# Patient Record
Sex: MALE | Race: WHITE | Employment: FULL TIME | URBAN - NONMETROPOLITAN AREA
[De-identification: names, ages, dates, MRNs, and addresses within clinical notes are randomized per-mention and may not be internally consistent; named-entity substitution may affect disease eponyms.]

---

## 2017-07-10 ENCOUNTER — OFFICE VISIT (OUTPATIENT)
Dept: CARDIOLOGY | Age: 62
End: 2017-07-10

## 2017-07-10 VITALS
SYSTOLIC BLOOD PRESSURE: 136 MMHG | HEART RATE: 74 BPM | BODY MASS INDEX: 29.6 KG/M2 | WEIGHT: 188.6 LBS | DIASTOLIC BLOOD PRESSURE: 78 MMHG | HEIGHT: 67 IN

## 2017-07-10 DIAGNOSIS — E78.01 FAMILIAL HYPERCHOLESTEROLEMIA: ICD-10-CM

## 2017-07-10 DIAGNOSIS — I25.10 CORONARY ARTERY DISEASE INVOLVING NATIVE CORONARY ARTERY OF NATIVE HEART WITHOUT ANGINA PECTORIS: Primary | ICD-10-CM

## 2017-07-10 DIAGNOSIS — I10 ESSENTIAL HYPERTENSION: ICD-10-CM

## 2017-07-10 PROCEDURE — 93000 ELECTROCARDIOGRAM COMPLETE: CPT | Performed by: NUCLEAR MEDICINE

## 2017-07-10 PROCEDURE — 99213 OFFICE O/P EST LOW 20 MIN: CPT | Performed by: NUCLEAR MEDICINE

## 2017-07-10 RX ORDER — VALSARTAN 80 MG/1
TABLET ORAL
Qty: 90 TABLET | Refills: 3 | Status: SHIPPED | OUTPATIENT
Start: 2017-07-10 | End: 2018-07-12 | Stop reason: SDUPTHER

## 2017-07-10 RX ORDER — CARVEDILOL PHOSPHATE 10 MG/1
10 CAPSULE, EXTENDED RELEASE ORAL
Qty: 90 CAPSULE | Refills: 3 | Status: SHIPPED | OUTPATIENT
Start: 2017-07-10 | End: 2018-07-12 | Stop reason: SDUPTHER

## 2017-07-10 RX ORDER — ATORVASTATIN CALCIUM 10 MG/1
TABLET, FILM COATED ORAL
Qty: 90 TABLET | Refills: 3 | Status: SHIPPED | OUTPATIENT
Start: 2017-07-10 | End: 2018-07-07 | Stop reason: ALTCHOICE

## 2017-08-04 ENCOUNTER — HOSPITAL ENCOUNTER (OUTPATIENT)
Age: 62
Discharge: HOME | End: 2017-08-04
Payer: COMMERCIAL

## 2017-08-04 DIAGNOSIS — R97.20 ELEVATED PSA: ICD-10-CM

## 2017-08-04 LAB — PROSTATE SPECIFIC ANTIGEN: 2.69 NG/ML (ref 0–1)

## 2017-08-04 PROCEDURE — 36415 COLL VENOUS BLD VENIPUNCTURE: CPT

## 2017-08-04 PROCEDURE — 84153 ASSAY OF PSA TOTAL: CPT

## 2017-08-10 ENCOUNTER — OFFICE VISIT (OUTPATIENT)
Dept: UROLOGY | Age: 62
End: 2017-08-10
Payer: COMMERCIAL

## 2017-08-10 VITALS
WEIGHT: 185 LBS | DIASTOLIC BLOOD PRESSURE: 78 MMHG | HEIGHT: 67 IN | BODY MASS INDEX: 29.03 KG/M2 | SYSTOLIC BLOOD PRESSURE: 128 MMHG

## 2017-08-10 DIAGNOSIS — R31.29 MICROSCOPIC HEMATURIA: ICD-10-CM

## 2017-08-10 DIAGNOSIS — R97.20 ELEVATED PSA: ICD-10-CM

## 2017-08-10 DIAGNOSIS — N40.1 BPH WITH OBSTRUCTION/LOWER URINARY TRACT SYMPTOMS: Primary | ICD-10-CM

## 2017-08-10 DIAGNOSIS — N13.8 BPH WITH OBSTRUCTION/LOWER URINARY TRACT SYMPTOMS: Primary | ICD-10-CM

## 2017-08-10 DIAGNOSIS — R39.198 DIFFICULTY URINATING: ICD-10-CM

## 2017-08-10 LAB
BACTERIA: NORMAL
BILIRUBIN URINE: NEGATIVE
BILIRUBIN URINE: NEGATIVE
BLOOD URINE, POC: NORMAL
BLOOD, URINE: NEGATIVE
CASTS: NORMAL /LPF
CASTS: NORMAL /LPF
CHARACTER, URINE: CLEAR
CHARACTER, URINE: CLEAR
COLOR, URINE: YELLOW
COLOR: YELLOW
CRYSTALS: NORMAL
EPITHELIAL CELLS, UA: NORMAL /HPF
GLUCOSE URINE: NEGATIVE MG/DL
GLUCOSE, URINE: NEGATIVE MG/DL
KETONES, URINE: NEGATIVE
KETONES, URINE: NEGATIVE
LEUKOCYTE CLUMPS, URINE: NEGATIVE
LEUKOCYTE ESTERASE, URINE: NEGATIVE
MISCELLANEOUS LAB TEST RESULT: NORMAL
NITRITE, URINE: NEGATIVE
NITRITE, URINE: NEGATIVE
PH UA: 6.5
PH, URINE: 6.5
POST VOID RESIDUAL (PVR): 54 ML
PROTEIN UA: NEGATIVE MG/DL
PROTEIN, URINE: NEGATIVE MG/DL
RBC URINE: NORMAL /HPF
RENAL EPITHELIAL, UA: NORMAL
SPECIFIC GRAVITY UA: 1.01 (ref 1–1.03)
SPECIFIC GRAVITY, URINE: 1.01 (ref 1–1.03)
UROBILINOGEN, URINE: 0.2 EU/DL
UROBILINOGEN, URINE: 0.2 EU/DL
WBC UA: NORMAL /HPF
YEAST: NORMAL

## 2017-08-10 PROCEDURE — 81003 URINALYSIS AUTO W/O SCOPE: CPT | Performed by: PHYSICIAN ASSISTANT

## 2017-08-10 PROCEDURE — 99213 OFFICE O/P EST LOW 20 MIN: CPT | Performed by: PHYSICIAN ASSISTANT

## 2017-08-10 PROCEDURE — 51798 US URINE CAPACITY MEASURE: CPT | Performed by: PHYSICIAN ASSISTANT

## 2017-10-31 DIAGNOSIS — N40.0 BENIGN NON-NODULAR PROSTATIC HYPERPLASIA WITHOUT LOWER URINARY TRACT SYMPTOMS: ICD-10-CM

## 2017-10-31 RX ORDER — SILODOSIN 4 MG/1
CAPSULE ORAL
Qty: 30 CAPSULE | Refills: 0 | Status: SHIPPED | OUTPATIENT
Start: 2017-10-31 | End: 2017-11-28 | Stop reason: SDUPTHER

## 2017-12-05 ENCOUNTER — TELEPHONE (OUTPATIENT)
Dept: UROLOGY | Age: 62
End: 2017-12-05

## 2017-12-05 NOTE — TELEPHONE ENCOUNTER
Prior Auth initiated for Rapaflo . [facility] Should receive response in 3-5 days.

## 2018-03-27 DIAGNOSIS — N40.0 BENIGN NON-NODULAR PROSTATIC HYPERPLASIA WITHOUT LOWER URINARY TRACT SYMPTOMS: ICD-10-CM

## 2018-03-28 RX ORDER — FINASTERIDE 5 MG/1
TABLET, FILM COATED ORAL
Qty: 90 TABLET | Refills: 0 | Status: SHIPPED | OUTPATIENT
Start: 2018-03-28 | End: 2018-08-03 | Stop reason: SDUPTHER

## 2018-06-08 DIAGNOSIS — J02.9 ACUTE PHARYNGITIS, UNSPECIFIED ETIOLOGY: Primary | ICD-10-CM

## 2018-06-08 RX ORDER — DOXYCYCLINE HYCLATE 100 MG
100 TABLET ORAL 2 TIMES DAILY
Qty: 20 TABLET | Refills: 0 | Status: SHIPPED | OUTPATIENT
Start: 2018-06-08 | End: 2018-06-18

## 2018-06-30 ENCOUNTER — HOSPITAL ENCOUNTER (OUTPATIENT)
Age: 63
Discharge: HOME | End: 2018-06-30
Payer: COMMERCIAL

## 2018-06-30 DIAGNOSIS — R97.20 ELEVATED PSA: ICD-10-CM

## 2018-06-30 DIAGNOSIS — E78.01 FAMILIAL HYPERCHOLESTEROLEMIA: ICD-10-CM

## 2018-06-30 LAB
ALBUMIN SERPL-MCNC: 4.1 G/DL (ref 3.5–5.1)
ALP BLD-CCNC: 39 U/L (ref 38–126)
ALT SERPL-CCNC: 24 U/L (ref 11–66)
AST SERPL-CCNC: 16 U/L (ref 5–40)
BILIRUB SERPL-MCNC: 0.6 MG/DL (ref 0.3–1.2)
BILIRUBIN DIRECT: < 0.2 MG/DL (ref 0–0.3)
CHOLESTEROL, TOTAL: 201 MG/DL (ref 100–199)
HDLC SERPL-MCNC: 56 MG/DL
LDL CHOLESTEROL CALCULATED: 126 MG/DL
PROSTATE SPECIFIC ANTIGEN: 3.54 NG/ML (ref 0–1)
TOTAL PROTEIN: 6.8 G/DL (ref 6.1–8)
TRIGL SERPL-MCNC: 97 MG/DL (ref 0–199)

## 2018-06-30 PROCEDURE — 36415 COLL VENOUS BLD VENIPUNCTURE: CPT

## 2018-06-30 PROCEDURE — 80076 HEPATIC FUNCTION PANEL: CPT

## 2018-06-30 PROCEDURE — 80061 LIPID PANEL: CPT

## 2018-06-30 PROCEDURE — 84153 ASSAY OF PSA TOTAL: CPT

## 2018-07-05 ENCOUNTER — PATIENT MESSAGE (OUTPATIENT)
Dept: FAMILY MEDICINE CLINIC | Age: 63
End: 2018-07-05

## 2018-07-05 DIAGNOSIS — R05.9 COUGH: Primary | ICD-10-CM

## 2018-07-05 RX ORDER — BENZONATATE 100 MG/1
200 CAPSULE ORAL 3 TIMES DAILY PRN
Qty: 30 CAPSULE | Refills: 1 | Status: SHIPPED | OUTPATIENT
Start: 2018-07-05 | End: 2018-07-07 | Stop reason: ALTCHOICE

## 2018-07-07 ENCOUNTER — HOSPITAL ENCOUNTER (EMERGENCY)
Age: 63
Discharge: HOME | End: 2018-07-07
Payer: COMMERCIAL

## 2018-07-07 VITALS
SYSTOLIC BLOOD PRESSURE: 141 MMHG | HEIGHT: 67 IN | BODY MASS INDEX: 28.25 KG/M2 | WEIGHT: 180 LBS | TEMPERATURE: 97.4 F | RESPIRATION RATE: 16 BRPM | OXYGEN SATURATION: 98 % | HEART RATE: 89 BPM | DIASTOLIC BLOOD PRESSURE: 84 MMHG

## 2018-07-07 DIAGNOSIS — J06.9 ACUTE UPPER RESPIRATORY INFECTION: Primary | ICD-10-CM

## 2018-07-07 PROCEDURE — 99212 OFFICE O/P EST SF 10 MIN: CPT

## 2018-07-07 PROCEDURE — 99213 OFFICE O/P EST LOW 20 MIN: CPT | Performed by: NURSE PRACTITIONER

## 2018-07-07 RX ORDER — BROMPHENIRAMINE MALEATE, PSEUDOEPHEDRINE HYDROCHLORIDE, AND DEXTROMETHORPHAN HYDROBROMIDE 2; 30; 10 MG/5ML; MG/5ML; MG/5ML
5 SYRUP ORAL 4 TIMES DAILY PRN
Qty: 118 ML | Refills: 0 | Status: SHIPPED | OUTPATIENT
Start: 2018-07-07 | End: 2018-08-16

## 2018-07-07 RX ORDER — CETIRIZINE HYDROCHLORIDE 10 MG/1
10 TABLET ORAL DAILY
Qty: 30 TABLET | Refills: 0 | Status: SHIPPED | OUTPATIENT
Start: 2018-07-07 | End: 2018-08-16

## 2018-07-07 RX ORDER — FLUTICASONE PROPIONATE 50 MCG
SPRAY, SUSPENSION (ML) NASAL
Qty: 1 BOTTLE | Refills: 0 | Status: SHIPPED | OUTPATIENT
Start: 2018-07-07 | End: 2018-08-16

## 2018-07-07 ASSESSMENT — ENCOUNTER SYMPTOMS
NAUSEA: 0
SHORTNESS OF BREATH: 0
VOMITING: 0
SINUS PRESSURE: 1
COUGH: 1
SINUS PAIN: 0
SORE THROAT: 1
ABDOMINAL PAIN: 0
DIARRHEA: 0
WHEEZING: 0
RHINORRHEA: 1
CHEST TIGHTNESS: 0

## 2018-07-07 ASSESSMENT — PAIN DESCRIPTION - FREQUENCY: FREQUENCY: INTERMITTENT

## 2018-07-07 ASSESSMENT — PAIN SCALES - GENERAL: PAINLEVEL_OUTOF10: 3

## 2018-07-07 ASSESSMENT — PAIN DESCRIPTION - LOCATION: LOCATION: THROAT

## 2018-07-07 ASSESSMENT — PAIN DESCRIPTION - DESCRIPTORS: DESCRIPTORS: SORE

## 2018-07-07 ASSESSMENT — PAIN DESCRIPTION - PAIN TYPE: TYPE: ACUTE PAIN

## 2018-07-07 NOTE — ED NOTES
No change in patients condition. Discharge instructions and prescriptions discussed with pt. Pt. Verbalized understanding of info given and ambulated to exit in stable condition.       Autumn Beatty RN  07/07/18 7921

## 2018-07-07 NOTE — ED PROVIDER NOTES
[address]  Urgent Care Encounter       CHIEF COMPLAINT       Chief Complaint   Patient presents with    Nasal Congestion     onset couple days ago with a cough    Pharyngitis     post nasal drip        Nurses Notes reviewed and I agree except as noted in the HPI. HISTORY OF PRESENT ILLNESS   Rosalina Skiff is a 70 y.o. male who presents With complaints of runny nose, sore throat, and cough for the past 2 days. Patient reported that symptoms did intensify yesterday. He also reports some mild chest congestion, clear nasal drainage, and PND. He does also report mild fatigue occasional headache. He denies fever or chills. Appetite has been normal and no reports of nausea, vomiting, or diarrhea. Has taken some Excedrin sinus medication twice with minimal relief. The history is provided by the patient. No  was used. REVIEW OF SYSTEMS     Review of Systems   Constitutional: Negative for appetite change, chills, fatigue and fever. HENT: Positive for congestion, postnasal drip, rhinorrhea, sinus pressure (resolved) and sore throat (worse at night and in morning). Negative for ear pain (fullness) and sinus pain. Respiratory: Positive for cough. Negative for chest tightness, shortness of breath and wheezing. Cardiovascular: Negative for chest pain. Gastrointestinal: Negative for abdominal pain, diarrhea, nausea and vomiting. Musculoskeletal: Negative for myalgias. Neurological: Positive for headaches (occasional). Negative for dizziness. PAST MEDICAL HISTORY         Diagnosis Date    CAD (coronary artery disease)     Hyperlipidemia     MI, old        SURGICAL HISTORY     Patient  has a past surgical history that includes Coronary angioplasty with stent (2008).     CURRENT MEDICATIONS       Discharge Medication List as of 7/7/2018  8:29 AM      CONTINUE these medications which have NOT CHANGED    Details   finasteride (PROSCAR) 5 MG tablet TAKE 1 TABLET BY MOUTH EVERY DAY, Disp-90 tablet, R-0Normal      RAPAFLO 4 MG CAPS capsule TAKE 1 CAPSULE BY MOUTH ONCE NIGHTLY, Disp-30 capsule, R-11Normal      valsartan (DIOVAN) 80 MG tablet TAKE ONE TABLET BY MOUTH EVERY DAY, Disp-90 tablet, R-3Normal      carvedilol (COREG CR) 10 MG CP24 extended release capsule Take 1 capsule by mouth daily (with breakfast), Disp-90 capsule, R-3Normal      Cyanocobalamin (B-12 PO) Take by mouth      Multiple Vitamins-Minerals (THERAPEUTIC MULTIVITAMIN-MINERALS) tablet Take 1 tablet by mouth daily      aspirin 81 MG chewable tablet Take 81 mg by mouth daily. ALLERGIES     Patient is is allergic to azithromycin and pcn [penicillins]. Patients   Immunization History   Administered Date(s) Administered    Tdap (Boostrix, Adacel) 03/17/2015       FAMILY HISTORY     Patient's family history is not on file. SOCIAL HISTORY     Patient  reports that he quit smoking about 41 years ago. His smoking use included Cigarettes. He started smoking about 45 years ago. He has a 0.50 pack-year smoking history. He has never used smokeless tobacco. He reports that he does not drink alcohol or use drugs. PHYSICAL EXAM     ED TRIAGE VITALS  BP: (!) 141/84, Temp: 97.4 °F (36.3 °C), Pulse: 89, Resp: 16, SpO2: 98 %,Estimated body mass index is 28.19 kg/m² as calculated from the following:    Height as of this encounter: 5' 7\" (1.702 m). Weight as of this encounter: 180 lb (81.6 kg). ,No LMP for male patient. Physical Exam   Constitutional: He is oriented to person, place, and time. Vital signs are normal. He appears well-developed and well-nourished. He is cooperative. He does not appear ill. No distress. HENT:   Head: Normocephalic and atraumatic.    Right Ear: Hearing, tympanic membrane, external ear and ear canal normal.   Left Ear: Hearing, tympanic membrane, external ear and ear canal normal.   Nose: Nose normal. Right sinus exhibits no maxillary sinus tenderness and no frontal sinus tenderness. Left sinus exhibits no maxillary sinus tenderness and no frontal sinus tenderness. Mouth/Throat: Uvula is midline and mucous membranes are normal. Posterior oropharyngeal erythema (mild) present. No oropharyngeal exudate, posterior oropharyngeal edema or tonsillar abscesses. Neck: Neck supple. Cardiovascular: Normal rate, regular rhythm and normal heart sounds. Pulmonary/Chest: Effort normal and breath sounds normal. No respiratory distress. Abdominal: Soft. Lymphadenopathy:        Head (right side): No submental, no submandibular, no tonsillar, no preauricular, no posterior auricular and no occipital adenopathy present. Head (left side): No submental, no submandibular, no tonsillar, no preauricular, no posterior auricular and no occipital adenopathy present. He has no cervical adenopathy. Neurological: He is alert and oriented to person, place, and time. Skin: Skin is warm and dry. Psychiatric: He has a normal mood and affect. His speech is normal and behavior is normal. Thought content normal.   Nursing note and vitals reviewed. DIAGNOSTIC RESULTS   Labs:No results found for this visit on 07/07/18. IMAGING:    No orders to display         URGENT CARE COURSE:     Vitals:    07/07/18 0810   BP: (!) 141/84   Pulse: 89   Resp: 16   Temp: 97.4 °F (36.3 °C)   TempSrc: Temporal   SpO2: 98%   Weight: 180 lb (81.6 kg)   Height: 5' 7\" (1.702 m)       Medications - No data to display         PROCEDURES:  None    FINAL IMPRESSION      1. Acute upper respiratory infection          DISPOSITION/PLAN   DISPOSITION    Plenty of fluids orally.  Salt water gargles as needed for sore throat or OTC throat spray/lozenges.  Cool mist humidifier at bedside for congestion.  Saline rinses as needed for nasal congestion.  May take Tylenol and/or Ibuprofen for fever or body aches as directed. May take OTC antihistamines/ decongestant as needed.  Follow up with family doctor as

## 2018-07-12 ENCOUNTER — OFFICE VISIT (OUTPATIENT)
Dept: CARDIOLOGY CLINIC | Age: 63
End: 2018-07-12
Payer: COMMERCIAL

## 2018-07-12 VITALS
HEIGHT: 67 IN | DIASTOLIC BLOOD PRESSURE: 74 MMHG | WEIGHT: 183.4 LBS | HEART RATE: 72 BPM | SYSTOLIC BLOOD PRESSURE: 128 MMHG | BODY MASS INDEX: 28.79 KG/M2

## 2018-07-12 DIAGNOSIS — I25.10 CORONARY ARTERY DISEASE INVOLVING NATIVE CORONARY ARTERY OF NATIVE HEART WITHOUT ANGINA PECTORIS: Primary | ICD-10-CM

## 2018-07-12 DIAGNOSIS — E78.01 FAMILIAL HYPERCHOLESTEROLEMIA: ICD-10-CM

## 2018-07-12 DIAGNOSIS — I10 ESSENTIAL HYPERTENSION: ICD-10-CM

## 2018-07-12 PROCEDURE — 99213 OFFICE O/P EST LOW 20 MIN: CPT | Performed by: NUCLEAR MEDICINE

## 2018-07-12 PROCEDURE — 93000 ELECTROCARDIOGRAM COMPLETE: CPT | Performed by: NUCLEAR MEDICINE

## 2018-07-12 RX ORDER — CARVEDILOL PHOSPHATE 10 MG/1
10 CAPSULE, EXTENDED RELEASE ORAL
Qty: 90 CAPSULE | Refills: 3 | Status: SHIPPED | OUTPATIENT
Start: 2018-07-12 | End: 2019-06-16 | Stop reason: SDUPTHER

## 2018-07-12 RX ORDER — VALSARTAN 80 MG/1
TABLET ORAL
Qty: 90 TABLET | Refills: 3 | Status: SHIPPED | OUTPATIENT
Start: 2018-07-12 | End: 2018-07-17 | Stop reason: SINTOL

## 2018-07-12 NOTE — PROGRESS NOTES
[facility] PROFESSIONAL SERVS  [facility]  [address]  [address]  [place] [address]  Dept: [phone]  Dept Fax: [phone]  Loc: [phone]    Visit Date: 2018    Renee Zavala is a 70 y.o. male who presents today for:  Chief Complaint   Patient presents with    1 Year Follow Up    Coronary Artery Disease    Hypertension    Hyperlipidemia   known stent to the LAD  Stopped statins due to memory issues that he was worried about and decided to stop it   Higher LDL   No angina  No changes in breathing  Higher risk patient        HPI:  HPI  Past Medical History:   Diagnosis Date    CAD (coronary artery disease)     Hyperlipidemia     MI, old       Past Surgical History:   Procedure Laterality Date    CORONARY ANGIOPLASTY WITH STENT PLACEMENT       History reviewed. No pertinent family history. Social History   Substance Use Topics    Smoking status: Former Smoker     Packs/day: 0.25     Years: 2.00     Types: Cigarettes     Start date: 1973     Quit date: 1977    Smokeless tobacco: Never Used    Alcohol use No      Current Outpatient Prescriptions   Medication Sig Dispense Refill    brompheniramine-pseudoephedrine-DM 30-2-10 MG/5ML syrup Take 5 mLs by mouth 4 times daily as needed for Congestion or Cough 118 mL 0    cetirizine (ZYRTEC) 10 MG tablet Take 1 tablet by mouth daily 30 tablet 0    fluticasone (FLONASE) 50 MCG/ACT nasal spray One spray in each nostril daily. Look at your toes while spraying.  1 Bottle 0    finasteride (PROSCAR) 5 MG tablet TAKE 1 TABLET BY MOUTH EVERY DAY 90 tablet 0    RAPAFLO 4 MG CAPS capsule TAKE 1 CAPSULE BY MOUTH ONCE NIGHTLY 30 capsule 11    valsartan (DIOVAN) 80 MG tablet TAKE ONE TABLET BY MOUTH EVERY DAY 90 tablet 3    carvedilol (COREG CR) 10 MG CP24 extended release capsule Take 1 capsule by mouth daily (with breakfast) 90 capsule 3    Cyanocobalamin (B-12 PO) Take by mouth      Multiple Vitamins-Minerals (THERAPEUTIC MULTIVITAMIN-MINERALS) tablet Take 1 tablet by mouth daily      aspirin 81 MG chewable tablet Take 81 mg by mouth daily. No current facility-administered medications for this visit. Allergies   Allergen Reactions    Azithromycin      hives    Pcn [Penicillins]      hives     Health Maintenance   Topic Date Due    AAA screen  1955    Hepatitis C screen  1955    HIV screen  06/21/1968    Shingles Vaccine (1 of 2 - 2 Dose Series) 06/21/2003    Colon cancer screen colonoscopy  06/21/2003    A1C test (Diabetic or Prediabetic)  07/02/2014    Pneumococcal low/med risk (1 of 2 - PCV13) 06/21/2018    Flu vaccine (1) 09/01/2018    Lipid screen  06/30/2023    DTaP/Tdap/Td vaccine (2 - Td) 03/17/2025       Subjective:  Review of Systems  General:   No fever, no chills, No fatigue or weight loss  Pulmonary:    No dyspnea, no wheezing  Cardiac:    Denies recent chest pain,   GI:     No nausea or vomiting, no abdominal pain  Neuro:    No dizziness or light headedness,   Musculoskeletal:  No recent active issues  Extremities:   No edema, good peripheral pulses      Objective:  Physical Exam  /74   Pulse 72   Ht 5' 7\" (1.702 m)   Wt 183 lb 6.4 oz (83.2 kg)   BMI 28.72 kg/m²   General:   Well developed, well nourished  Lungs:   Clear to auscultation  Heart:    Normal S1 S2, Slight murmur. no rubs, no gallops  Abdomen:   Soft, non tender, no organomegalies, positive bowel sounds  Extremities:   No edema, no cyanosis, good peripheral pulses  Neurological:   Awake, alert, oriented. No obvious focal deficits  Musculoskelatal:  No obvious deformities    Assessment:      Diagnosis Orders   1. Coronary artery disease involving native coronary artery of native heart without angina pectoris  EKG 12 Lead   2. Essential hypertension     3. Familial hypercholesterolemia     cardiac fair for now   ECG in office was done today. I reviewed the ECG.  No acute findings      Plan:  No Follow-up on file.  As above  Discussed statins again and in details   Continue risk factor modification and medical management  Thank you for allowing me to participate in the care of your patient. Please don't hesitate to contact me regarding any further issues related to the patient care    Orders Placed:  Orders Placed This Encounter   Procedures    EKG 12 Lead     Order Specific Question:   Reason for Exam?     Answer: Other       Medications Prescribed:  No orders of the defined types were placed in this encounter. Discussed use, benefit, and side effects of prescribed medications. All patient questions answered. Pt voiced understanding. Instructed to continue current medications, diet and exercise. Continue risk factor modification and medical management. Patient agreed with treatment plan. Follow up as directed.     Electronically signed by Eliz Dukes MD on 7/12/2018 at 9:55 AM

## 2018-07-17 ENCOUNTER — TELEPHONE (OUTPATIENT)
Dept: CARDIOLOGY CLINIC | Age: 63
End: 2018-07-17

## 2018-07-17 RX ORDER — IRBESARTAN 300 MG/1
300 TABLET ORAL DAILY
COMMUNITY
End: 2018-07-17 | Stop reason: SDUPTHER

## 2018-07-17 RX ORDER — IRBESARTAN 300 MG/1
300 TABLET ORAL DAILY
Qty: 90 TABLET | Refills: 3 | Status: SHIPPED | OUTPATIENT
Start: 2018-07-17 | End: 2019-08-11 | Stop reason: SDUPTHER

## 2018-07-17 RX ORDER — IRBESARTAN 300 MG/1
300 TABLET ORAL DAILY
Qty: 90 TABLET | Refills: 3 | Status: CANCELLED | OUTPATIENT
Start: 2018-07-17

## 2018-07-17 NOTE — TELEPHONE ENCOUNTER
Nubia Hammond called stating that she called [facility] and his Valsartan is on the list for recall. Verbalized understanding to start Avapro 300mg daily.    RX pended in a refill request.

## 2018-08-08 ENCOUNTER — PATIENT MESSAGE (OUTPATIENT)
Dept: FAMILY MEDICINE CLINIC | Age: 63
End: 2018-08-08

## 2018-08-08 RX ORDER — METHYLPREDNISOLONE 4 MG/1
TABLET ORAL
Qty: 1 KIT | Refills: 0 | Status: SHIPPED | OUTPATIENT
Start: 2018-08-08 | End: 2018-08-14

## 2018-08-14 NOTE — PROGRESS NOTES
Mr. Johnson Flank a 71 year old male with a history of an elevated PSA. His PSA has ranged from 7.72-9.01 before starting Proscar therapy. A prostate biopsy was performed in September 2013, which was negative for malignancy. A PCA-3 level was obtained in October 2014 due to the persistence of his PSA and his value was 7 (negative). He was started on Proscar in October 2014 but was suboptimally controlled. He states that he tried Flomax in the past but discontinued due to orthostatic side effects and ineffectiveness of medication. He started Rapaflo in February 2016 and has noticed an improvement in his irritative and obstructive symptoms. In regards to his current urinary health, he reports minimal frequency, urgency, and weakened stream. He is very pleased with his current urinary state. He denies fever/chills, flank pain, gross hematuria, dysuria, bowel irregularity. Of general medical concern, he denies dyspnea, chest pain, N/V, weight loss, night sweats, leg pain, or lightheadedness. Past Medical History:   Diagnosis Date    CAD (coronary artery disease)     Hyperlipidemia     MI, old        Past Surgical History:   Procedure Laterality Date    CORONARY ANGIOPLASTY WITH STENT PLACEMENT  2008       Current Outpatient Prescriptions on File Prior to Visit   Medication Sig Dispense Refill    finasteride (PROSCAR) 5 MG tablet TAKE 1 TABLET BY MOUTH EVERY DAY 90 tablet 3    irbesartan (AVAPRO) 300 MG tablet Take 1 tablet by mouth daily 90 tablet 3    carvedilol (COREG CR) 10 MG CP24 extended release capsule Take 1 capsule by mouth daily (with breakfast) 90 capsule 3    RAPAFLO 4 MG CAPS capsule TAKE 1 CAPSULE BY MOUTH ONCE NIGHTLY 30 capsule 11    Cyanocobalamin (B-12 PO) Take by mouth      Multiple Vitamins-Minerals (THERAPEUTIC MULTIVITAMIN-MINERALS) tablet Take 1 tablet by mouth daily      aspirin 81 MG chewable tablet Take 81 mg by mouth daily.          No current facility-administered medications on file prior to visit. Allergies   Allergen Reactions    Azithromycin      hives    Pcn [Penicillins]      hives       No family history on file. Social History     Social History    Marital status:      Spouse name: N/A    Number of children: N/A    Years of education: N/A     Occupational History    Not on file. Social History Main Topics    Smoking status: Former Smoker     Packs/day: 0.25     Years: 2.00     Types: Cigarettes     Start date: 1/1/1973     Quit date: 1/1/1977    Smokeless tobacco: Never Used    Alcohol use No    Drug use: No    Sexual activity: Not on file     Other Topics Concern    Not on file     Social History Narrative    No narrative on file       Review of Systems  No problems with ears, nose or throat. No problems with eyes. No chest pain, shortness of breath, abdominal pain, extremity pain or weakness, and no neurological deficits. No rashes. No swollen glands or lymph nodes.  symptoms per HPI. The remainder of the review of symptoms is negative.     Exam     /78   Ht 5' 7\" (1.702 m)   Wt 185 lb (83.9 kg)   BMI 28.98 kg/m²     Constitutional: He is oriented to person, place, and time. He appears well-developed and well-nourished.    HENT:    Head: Normocephalic and atraumatic. Cardiovascular: Normal rate, regular rhythm and normal heart sounds.  No murmur heard. Pulmonary/Chest: Effort normal and breath sounds normal.    Abdominal: Soft. Bowel sounds are normal. Non-tender. Non-distended. : Patient declined examination today. Musculoskeletal: He exhibits no edema or calf tenderness. Neurological: He is alert and oriented to person, place, and time.    Skin: Skin is warm and dry. Psychiatric: He has a normal mood and affect.  His behavior is normal.      Labs    Results for POC orders placed in visit on 08/16/18   POCT Urinalysis No Micro (Auto)   Result Value Ref Range    Glucose, Ur Negative NEGATIVE mg/dl    Bilirubin Urine

## 2018-08-16 ENCOUNTER — OFFICE VISIT (OUTPATIENT)
Dept: UROLOGY | Age: 63
End: 2018-08-16
Payer: COMMERCIAL

## 2018-08-16 VITALS
BODY MASS INDEX: 29.03 KG/M2 | SYSTOLIC BLOOD PRESSURE: 134 MMHG | DIASTOLIC BLOOD PRESSURE: 78 MMHG | WEIGHT: 185 LBS | HEIGHT: 67 IN

## 2018-08-16 DIAGNOSIS — R97.20 ELEVATED PSA: ICD-10-CM

## 2018-08-16 DIAGNOSIS — N13.8 BPH WITH OBSTRUCTION/LOWER URINARY TRACT SYMPTOMS: Primary | ICD-10-CM

## 2018-08-16 DIAGNOSIS — R39.198 DIFFICULTY URINATING: ICD-10-CM

## 2018-08-16 DIAGNOSIS — R31.29 MICROSCOPIC HEMATURIA: ICD-10-CM

## 2018-08-16 DIAGNOSIS — N40.1 BPH WITH OBSTRUCTION/LOWER URINARY TRACT SYMPTOMS: Primary | ICD-10-CM

## 2018-08-16 LAB
BACTERIA: ABNORMAL /HPF
BILIRUBIN URINE: NEGATIVE
BILIRUBIN URINE: NEGATIVE
BLOOD URINE, POC: ABNORMAL
BLOOD, URINE: ABNORMAL
CASTS 2: ABNORMAL /LPF
CASTS UA: ABNORMAL /LPF
CHARACTER, URINE: CLEAR
CHARACTER, URINE: CLEAR
COLOR, URINE: YELLOW
COLOR: YELLOW
CRYSTALS, UA: ABNORMAL
EPITHELIAL CELLS, UA: ABNORMAL /HPF
GLUCOSE URINE: NEGATIVE MG/DL
GLUCOSE URINE: NEGATIVE MG/DL
KETONES, URINE: NEGATIVE
KETONES, URINE: NEGATIVE
LEUKOCYTE CLUMPS, URINE: NEGATIVE
LEUKOCYTE ESTERASE, URINE: NEGATIVE
MISCELLANEOUS 2: ABNORMAL
NITRITE, URINE: NEGATIVE
NITRITE, URINE: NEGATIVE
PH UA: 5.5
PH, URINE: 5.5
POST VOID RESIDUAL (PVR): 91 ML
PROTEIN UA: NEGATIVE
PROTEIN, URINE: NEGATIVE MG/DL
RBC URINE: ABNORMAL /HPF
RENAL EPITHELIAL, UA: ABNORMAL
SPECIFIC GRAVITY, URINE: 1.01 (ref 1–1.03)
SPECIFIC GRAVITY, URINE: 1.02 (ref 1–1.03)
UROBILINOGEN, URINE: 0.2 EU/DL
UROBILINOGEN, URINE: 0.2 EU/DL
WBC UA: ABNORMAL /HPF
YEAST: ABNORMAL

## 2018-08-16 PROCEDURE — 99213 OFFICE O/P EST LOW 20 MIN: CPT | Performed by: PHYSICIAN ASSISTANT

## 2018-08-16 PROCEDURE — 81003 URINALYSIS AUTO W/O SCOPE: CPT | Performed by: PHYSICIAN ASSISTANT

## 2018-08-16 PROCEDURE — 51798 US URINE CAPACITY MEASURE: CPT | Performed by: PHYSICIAN ASSISTANT

## 2018-08-17 NOTE — PROGRESS NOTES
I have reviewed the patients chart and Vesta Calloway has discussed relevant portions with me. I agree with the assessment and plan.

## 2018-08-21 RX ORDER — CARVEDILOL PHOSPHATE 10 MG/1
CAPSULE, EXTENDED RELEASE ORAL
Qty: 90 CAPSULE | Refills: 3 | Status: SHIPPED | OUTPATIENT
Start: 2018-08-21 | End: 2019-07-26

## 2018-09-09 ENCOUNTER — TELEPHONE (OUTPATIENT)
Dept: UROLOGY | Age: 63
End: 2018-09-09

## 2018-09-10 ENCOUNTER — TELEPHONE (OUTPATIENT)
Dept: UROLOGY | Age: 63
End: 2018-09-10

## 2018-09-10 NOTE — TELEPHONE ENCOUNTER
Spoke with patient regarding urine cytology results and recommendation of Bladder Cx testing. Patient stated he had looked at his results of the cytology on My Chart and didn't think that there was much of a difference from the last one. He stated that he isn't sure that financially he wants to pay the expense for further testing. I tried to give him the contact number for [facility] to discuss the Bladder Cx out of pocket costs and he was not interested. He stated he would think about it. He also said that if you wanted to send him anything through My Chart he would look at it.

## 2018-09-10 NOTE — TELEPHONE ENCOUNTER
Office staff called Mr. Jean-Paul Short regarding his abnormal urine cytology findings. He declined further evaluation. We will send a letter to him in case he did not understand the potential seriousness of this abnormal finding. . Taylor Maki were recently contacted by our office (via phone) regarding your recent abnormal urine cytology results. To clarify, the atypical cells found in your urine constitute a new finding. Urine cytologies obtained in 2016 and 2017 were negative. Atypical cells in the urine can be the result of benign conditions such as inflammation or infection within the urinary tract. However, they can also be an indicator for more serious pathology such as a tumor within the genitourinary tract. We do have genetic testing available that can be performed on a urine sample that can help rule out cancer as a cause for these atypical cells. Genetic testing can detect tumors at a very early stage, making diagnosis and treatment much more timely. I highly recommend Bladder Cx testing as the next step to see if more invasive testing is necessary. Please contact our office immediately for further information about the Bladder Cx testing. Thank you for your consideration.

## 2018-09-10 NOTE — TELEPHONE ENCOUNTER
Patient went to pharmacy to  Rapaflo and the pharmacy states that it needs a prior authorization. They gave them a number to have our office contact to initiate the prior authorization, [phone]. Can you please look into this and let them know when you find out? Thank you.

## 2018-09-10 NOTE — TELEPHONE ENCOUNTER
History: Patient has a history of microscopic hematuria. 2017 urine cytology was negative. Office staff may deliver the following message. Patient's 2018 urine cytology was positive for atypical cells. Atypical cells can be from benign conditions such as inflammation or infection but can also be from more serious pathology such as a tumor within the  tract. I would like to obtain genetic testing on his urine to rule out cancer as a cause for these atypical cells. I highly recommend Bladder Cx testing as the next step to see if more invasive testing is necessary. Please discuss patient's possible out of pocket cost for this testing. If he does not want to proceed with this testing, please let me know immediately.

## 2018-12-10 DIAGNOSIS — N40.0 BENIGN NON-NODULAR PROSTATIC HYPERPLASIA WITHOUT LOWER URINARY TRACT SYMPTOMS: ICD-10-CM

## 2018-12-11 RX ORDER — SILODOSIN 4 MG/1
CAPSULE ORAL
Qty: 30 CAPSULE | Refills: 0 | Status: SHIPPED | OUTPATIENT
Start: 2018-12-11 | End: 2019-01-25 | Stop reason: SDUPTHER

## 2019-01-25 ENCOUNTER — TELEPHONE (OUTPATIENT)
Dept: UROLOGY | Age: 64
End: 2019-01-25

## 2019-01-25 DIAGNOSIS — N40.0 BENIGN NON-NODULAR PROSTATIC HYPERPLASIA WITHOUT LOWER URINARY TRACT SYMPTOMS: ICD-10-CM

## 2019-01-25 RX ORDER — SILODOSIN 4 MG/1
4 CAPSULE ORAL EVERY EVENING
Qty: 30 CAPSULE | Refills: 11 | Status: SHIPPED | OUTPATIENT
Start: 2019-01-25 | End: 2020-01-23

## 2019-06-17 RX ORDER — CARVEDILOL PHOSPHATE 10 MG/1
CAPSULE, EXTENDED RELEASE ORAL
Qty: 90 CAPSULE | Refills: 0 | Status: SHIPPED | OUTPATIENT
Start: 2019-06-17 | End: 2020-01-22

## 2019-07-26 ENCOUNTER — OFFICE VISIT (OUTPATIENT)
Dept: CARDIOLOGY CLINIC | Age: 64
End: 2019-07-26
Payer: COMMERCIAL

## 2019-07-26 VITALS
DIASTOLIC BLOOD PRESSURE: 80 MMHG | WEIGHT: 190 LBS | HEART RATE: 60 BPM | BODY MASS INDEX: 29.82 KG/M2 | HEIGHT: 67 IN | SYSTOLIC BLOOD PRESSURE: 132 MMHG

## 2019-07-26 DIAGNOSIS — E78.01 FAMILIAL HYPERCHOLESTEROLEMIA: ICD-10-CM

## 2019-07-26 DIAGNOSIS — I25.10 CORONARY ARTERY DISEASE INVOLVING NATIVE CORONARY ARTERY OF NATIVE HEART WITHOUT ANGINA PECTORIS: Primary | ICD-10-CM

## 2019-07-26 DIAGNOSIS — I10 ESSENTIAL HYPERTENSION: ICD-10-CM

## 2019-07-26 PROCEDURE — 99213 OFFICE O/P EST LOW 20 MIN: CPT | Performed by: NUCLEAR MEDICINE

## 2019-07-26 PROCEDURE — 93000 ELECTROCARDIOGRAM COMPLETE: CPT | Performed by: NUCLEAR MEDICINE

## 2019-07-26 NOTE — PROGRESS NOTES
[address] [facility],[place] [address] [address] [facility] [address]  [facility] [address]  Dept: [phone]  Dept Fax: [phone]  Loc: [phone]    Visit Date: 2019    James Diallo is a 75 y.o. male who presents todayfor:  Chief Complaint   Patient presents with    Hypertension    Hyperlipidemia    Coronary Artery Disease     Known LAd stent before  No chest pain   No changes in breathing  No new symptoms  Stooped his statins  No other issues      HPI:  HPI  Past Medical History:   Diagnosis Date    CAD (coronary artery disease)     Hyperlipidemia     MI, old       Past Surgical History:   Procedure Laterality Date    CORONARY ANGIOPLASTY WITH STENT PLACEMENT       No family history on file. Social History     Tobacco Use    Smoking status: Former Smoker     Packs/day: 0.25     Years: 2.00     Pack years: 0.50     Types: Cigarettes     Start date: 1973     Last attempt to quit: 1977     Years since quittin.5    Smokeless tobacco: Never Used   Substance Use Topics    Alcohol use: No      Current Outpatient Medications   Medication Sig Dispense Refill    carvedilol (COREG CR) 10 MG CP24 extended release capsule TAKE 1 CAPSULE BY MOUTH DAILY WITH BREAKFAST 90 capsule 0    silodosin (RAPAFLO) 4 MG CAPS capsule Take 1 capsule by mouth every evening 30 capsule 11    finasteride (PROSCAR) 5 MG tablet TAKE 1 TABLET BY MOUTH EVERY DAY 90 tablet 3    irbesartan (AVAPRO) 300 MG tablet Take 1 tablet by mouth daily 90 tablet 3    Cyanocobalamin (B-12 PO) Take by mouth      Multiple Vitamins-Minerals (THERAPEUTIC MULTIVITAMIN-MINERALS) tablet Take 1 tablet by mouth daily      aspirin 81 MG chewable tablet Take 81 mg by mouth daily. No current facility-administered medications for this visit.       Allergies   Allergen Reactions    Azithromycin      hives    Pcn [Penicillins]      hives     Health Maintenance   Topic Date Due    Potassium 12 lead     Order Specific Question:   Reason for Exam?     Answer: Other       Medications Prescribed:  No orders of the defined types were placed in this encounter. Discussed use, benefit, and side effects of prescribed medications. All patient questions answered. Pt voicedunderstanding. Instructed to continue current medications, diet and exercise. Continue risk factor modification and medical management. Patient agreed with treatment plan. Follow up as directed.     Electronically signedby Rodolfo Aleman MD on 7/26/2019 at 7:40 AM

## 2019-08-13 RX ORDER — IRBESARTAN 300 MG/1
300 TABLET ORAL DAILY
Qty: 90 TABLET | Refills: 0 | Status: SHIPPED | OUTPATIENT
Start: 2019-08-13 | End: 2019-11-10 | Stop reason: SDUPTHER

## 2019-08-19 DIAGNOSIS — R97.20 ELEVATED PSA: Primary | ICD-10-CM

## 2019-08-20 ENCOUNTER — HOSPITAL ENCOUNTER (OUTPATIENT)
Age: 64
Discharge: HOME | End: 2019-08-20
Payer: COMMERCIAL

## 2019-08-20 DIAGNOSIS — R97.20 ELEVATED PSA: ICD-10-CM

## 2019-08-20 LAB — PROSTATE SPECIFIC ANTIGEN: 2.21 NG/ML (ref 0–1)

## 2019-08-20 PROCEDURE — 36415 COLL VENOUS BLD VENIPUNCTURE: CPT

## 2019-08-20 PROCEDURE — 84153 ASSAY OF PSA TOTAL: CPT

## 2019-08-20 NOTE — PROGRESS NOTES
Mr. Breanna Hays is a 40-year-old male with a history of an elevated PSA. His PSA has ranged from 7.72-9.01 before starting Proscar therapy. A prostate biopsy was performed in September 2013, which was negative for malignancy. A PCA-3 level was obtained in October 2014 due to the persistence of his PSA and his value was 7 (negative). He was started on Proscar in October 2014 but was suboptimally controlled. He states that he tried Flomax in the past but discontinued due to orthostatic side effects and ineffectiveness of medication. He started Rapaflo in February 2016 and has noticed an improvement in his irritative and obstructive symptoms. In regards to his current urinary health, he reports minimal frequency, urgency, and weakened stream. He is very pleased with his current urinary state. He denies fever/chills, flank pain, gross hematuria, dysuria, bowel irregularity. Of general medical concern, he denies dyspnea, chest pain, N/V, weight loss, night sweats, leg pain, or lightheadedness. He presents today for further evaluation. Past Medical History:   Diagnosis Date    CAD (coronary artery disease)     Hyperlipidemia     MI, old        Past Surgical History:   Procedure Laterality Date    CORONARY ANGIOPLASTY WITH STENT PLACEMENT  2008       Current Outpatient Medications on File Prior to Visit   Medication Sig Dispense Refill    irbesartan (AVAPRO) 300 MG tablet TAKE 1 TABLET BY MOUTH DAILY 90 tablet 0    carvedilol (COREG CR) 10 MG CP24 extended release capsule TAKE 1 CAPSULE BY MOUTH DAILY WITH BREAKFAST 90 capsule 0    silodosin (RAPAFLO) 4 MG CAPS capsule Take 1 capsule by mouth every evening 30 capsule 11    finasteride (PROSCAR) 5 MG tablet TAKE 1 TABLET BY MOUTH EVERY DAY 90 tablet 3    Cyanocobalamin (B-12 PO) Take by mouth      Multiple Vitamins-Minerals (THERAPEUTIC MULTIVITAMIN-MINERALS) tablet Take 1 tablet by mouth daily      aspirin 81 MG chewable tablet Take 81 mg by mouth daily. appointment per patient request. Call with questions or concerns in the meantime.      2. History of elevated PSA- PSA has decreased from 7.72 to 2.80 after initiating Proscar therapy. August 2019 PSA has decreased substantially from 3.54 to 2.21 over the last year. He did not have an MRI of the pelvis which was recommended, based upon his PSA last year. He agrees to continue to monitor PSA levels closely. He refuses to have a WALTER today. Patient is very apprehensive about further prostate biopsies as he states he had moderate pain and bleeding after his procedure in 2013. He states that he will consider MRI of the pelvis with and without contrast only if his PSA dramatically rises. Will recheck PSA in six months and call with results.       3. Microscopic hematuria/Abnormal urine cytology- Per urine dip. Urine cytology last August 2018 was positive for atypical cells. He refused Bladder Cx testing and office cystoscopy for further delineation. We will obtain a urine cytology and micro today.

## 2019-08-22 ENCOUNTER — OFFICE VISIT (OUTPATIENT)
Dept: UROLOGY | Age: 64
End: 2019-08-22
Payer: COMMERCIAL

## 2019-08-22 VITALS
BODY MASS INDEX: 29.51 KG/M2 | WEIGHT: 188 LBS | DIASTOLIC BLOOD PRESSURE: 72 MMHG | SYSTOLIC BLOOD PRESSURE: 124 MMHG | HEIGHT: 67 IN

## 2019-08-22 DIAGNOSIS — N13.8 BPH WITH OBSTRUCTION/LOWER URINARY TRACT SYMPTOMS: ICD-10-CM

## 2019-08-22 DIAGNOSIS — R39.198 DIFFICULTY URINATING: ICD-10-CM

## 2019-08-22 DIAGNOSIS — R97.20 ELEVATED PSA: Primary | ICD-10-CM

## 2019-08-22 DIAGNOSIS — N40.1 BPH WITH OBSTRUCTION/LOWER URINARY TRACT SYMPTOMS: ICD-10-CM

## 2019-08-22 DIAGNOSIS — R31.29 MICROSCOPIC HEMATURIA: ICD-10-CM

## 2019-08-22 LAB
BILIRUBIN URINE: NEGATIVE
BILIRUBIN URINE: NEGATIVE
BLOOD URINE, POC: NORMAL
BLOOD, URINE: NEGATIVE
CHARACTER, URINE: CLEAR
CHARACTER, URINE: CLEAR
COLOR, URINE: YELLOW
COLOR: YELLOW
GLUCOSE URINE: NEGATIVE MG/DL
GLUCOSE URINE: NEGATIVE MG/DL
KETONES, URINE: NEGATIVE
KETONES, URINE: NEGATIVE
LEUKOCYTE CLUMPS, URINE: NEGATIVE
LEUKOCYTE ESTERASE, URINE: NEGATIVE
NITRITE, URINE: NEGATIVE
NITRITE, URINE: NEGATIVE
PH UA: 7 (ref 5–9)
PH, URINE: 7 (ref 5–9)
PROTEIN UA: NEGATIVE
PROTEIN, URINE: NEGATIVE MG/DL
SPECIFIC GRAVITY, URINE: 1.01 (ref 1–1.03)
SPECIFIC GRAVITY, URINE: 1.01 (ref 1–1.03)
UROBILINOGEN, URINE: 0.2 EU/DL (ref 0–1)
UROBILINOGEN, URINE: 0.2 EU/DL (ref 0–1)

## 2019-08-22 PROCEDURE — 99213 OFFICE O/P EST LOW 20 MIN: CPT | Performed by: PHYSICIAN ASSISTANT

## 2019-08-22 PROCEDURE — 81003 URINALYSIS AUTO W/O SCOPE: CPT | Performed by: PHYSICIAN ASSISTANT

## 2019-08-27 ENCOUNTER — TELEPHONE (OUTPATIENT)
Dept: UROLOGY | Age: 64
End: 2019-08-27

## 2019-08-27 NOTE — TELEPHONE ENCOUNTER
I attempted to call the patient regarding the message below.  I left a voicemail to return the call to the office

## 2019-09-10 DIAGNOSIS — N40.0 BENIGN NON-NODULAR PROSTATIC HYPERPLASIA WITHOUT LOWER URINARY TRACT SYMPTOMS: ICD-10-CM

## 2019-09-10 RX ORDER — FINASTERIDE 5 MG/1
TABLET, FILM COATED ORAL
Qty: 90 TABLET | Refills: 0 | Status: SHIPPED | OUTPATIENT
Start: 2019-09-10 | End: 2019-12-08 | Stop reason: SDUPTHER

## 2019-11-11 RX ORDER — IRBESARTAN 300 MG/1
300 TABLET ORAL DAILY
Qty: 90 TABLET | Refills: 3 | Status: SHIPPED | OUTPATIENT
Start: 2019-11-11 | End: 2020-11-13 | Stop reason: SDUPTHER

## 2019-11-20 ENCOUNTER — OFFICE VISIT (OUTPATIENT)
Dept: FAMILY MEDICINE CLINIC | Age: 64
End: 2019-11-20
Payer: COMMERCIAL

## 2019-11-20 VITALS
WEIGHT: 193.8 LBS | DIASTOLIC BLOOD PRESSURE: 70 MMHG | RESPIRATION RATE: 16 BRPM | HEART RATE: 76 BPM | SYSTOLIC BLOOD PRESSURE: 124 MMHG | BODY MASS INDEX: 30.35 KG/M2

## 2019-11-20 DIAGNOSIS — M25.562 ACUTE PAIN OF LEFT KNEE: Primary | ICD-10-CM

## 2019-11-20 PROCEDURE — 99213 OFFICE O/P EST LOW 20 MIN: CPT | Performed by: NURSE PRACTITIONER

## 2019-11-20 RX ORDER — NAPROXEN 500 MG/1
500 TABLET ORAL 2 TIMES DAILY PRN
Qty: 30 TABLET | Refills: 1 | Status: SHIPPED | OUTPATIENT
Start: 2019-11-20 | End: 2020-04-27

## 2019-11-20 ASSESSMENT — ENCOUNTER SYMPTOMS
ABDOMINAL PAIN: 0
SHORTNESS OF BREATH: 0
NAUSEA: 0
COUGH: 0

## 2019-11-20 ASSESSMENT — PATIENT HEALTH QUESTIONNAIRE - PHQ9
2. FEELING DOWN, DEPRESSED OR HOPELESS: 0
SUM OF ALL RESPONSES TO PHQ QUESTIONS 1-9: 0
SUM OF ALL RESPONSES TO PHQ9 QUESTIONS 1 & 2: 0
1. LITTLE INTEREST OR PLEASURE IN DOING THINGS: 0
SUM OF ALL RESPONSES TO PHQ QUESTIONS 1-9: 0

## 2019-11-21 ENCOUNTER — HOSPITAL ENCOUNTER (OUTPATIENT)
Dept: GENERAL RADIOLOGY | Age: 64
Discharge: HOME | End: 2019-11-21
Payer: COMMERCIAL

## 2019-11-21 ENCOUNTER — HOSPITAL ENCOUNTER (OUTPATIENT)
Age: 64
Discharge: HOME | End: 2019-11-21
Payer: COMMERCIAL

## 2019-11-21 DIAGNOSIS — M25.562 ACUTE PAIN OF LEFT KNEE: ICD-10-CM

## 2019-11-21 PROCEDURE — 73564 X-RAY EXAM KNEE 4 OR MORE: CPT

## 2019-11-25 ENCOUNTER — PATIENT MESSAGE (OUTPATIENT)
Dept: FAMILY MEDICINE CLINIC | Age: 64
End: 2019-11-25

## 2019-11-25 DIAGNOSIS — S83.8X2A MENISCAL INJURY, LEFT, INITIAL ENCOUNTER: Primary | ICD-10-CM

## 2019-11-25 DIAGNOSIS — M25.362 KNEE INSTABILITY, LEFT: ICD-10-CM

## 2019-11-25 DIAGNOSIS — M25.562 ACUTE PAIN OF LEFT KNEE: ICD-10-CM

## 2019-12-06 DIAGNOSIS — S83.232A COMPLEX TEAR OF MEDIAL MENISCUS OF LEFT KNEE AS CURRENT INJURY, INITIAL ENCOUNTER: Primary | ICD-10-CM

## 2019-12-08 DIAGNOSIS — N40.0 BENIGN NON-NODULAR PROSTATIC HYPERPLASIA WITHOUT LOWER URINARY TRACT SYMPTOMS: ICD-10-CM

## 2019-12-09 RX ORDER — FINASTERIDE 5 MG/1
TABLET, FILM COATED ORAL
Qty: 90 TABLET | Refills: 0 | Status: SHIPPED | OUTPATIENT
Start: 2019-12-09 | End: 2020-03-24

## 2020-01-22 RX ORDER — CARVEDILOL PHOSPHATE 10 MG/1
CAPSULE, EXTENDED RELEASE ORAL
Qty: 90 CAPSULE | Refills: 1 | Status: SHIPPED | OUTPATIENT
Start: 2020-01-22 | End: 2020-06-29

## 2020-01-22 NOTE — TELEPHONE ENCOUNTER
Prescription Refill Request    The pharmacy is requesting a refill of rapaflo, 4 mg, taken every evening  Last prescribed on 01/25/2019    Last office visit 08/22/2019 with provider Rosie ROSENBAUM  Next office visit 08/27/2020 with provider Rosie ROSENBAUM    Please send refill to [facility]    Thank you.

## 2020-01-23 RX ORDER — SILODOSIN 4 MG/1
4 CAPSULE ORAL EVERY EVENING
Qty: 30 CAPSULE | Refills: 11 | Status: SHIPPED | OUTPATIENT
Start: 2020-01-23 | End: 2020-12-31

## 2020-02-03 ENCOUNTER — TELEPHONE (OUTPATIENT)
Dept: CARDIOLOGY CLINIC | Age: 65
End: 2020-02-03

## 2020-02-13 ENCOUNTER — HOSPITAL ENCOUNTER (OUTPATIENT)
Dept: NON INVASIVE DIAGNOSTICS | Age: 65
Discharge: HOME | End: 2020-02-13
Payer: COMMERCIAL

## 2020-02-13 LAB
LV EF: 60 %
LVEF MODALITY: NORMAL

## 2020-02-13 PROCEDURE — A9500 TC99M SESTAMIBI: HCPCS | Performed by: NUCLEAR MEDICINE

## 2020-02-13 PROCEDURE — 93017 CV STRESS TEST TRACING ONLY: CPT | Performed by: NUCLEAR MEDICINE

## 2020-02-13 PROCEDURE — 2709999900 HC NON-CHARGEABLE SUPPLY

## 2020-02-13 PROCEDURE — 3430000000 HC RX DIAGNOSTIC RADIOPHARMACEUTICAL: Performed by: NUCLEAR MEDICINE

## 2020-02-13 PROCEDURE — 6360000002 HC RX W HCPCS

## 2020-02-13 PROCEDURE — 93306 TTE W/DOPPLER COMPLETE: CPT

## 2020-02-13 PROCEDURE — 78452 HT MUSCLE IMAGE SPECT MULT: CPT

## 2020-02-13 RX ADMIN — Medication 32.2 MILLICURIE: at 08:45

## 2020-02-13 RX ADMIN — Medication 9.8 MILLICURIE: at 07:54

## 2020-02-14 ENCOUNTER — TELEPHONE (OUTPATIENT)
Dept: CARDIOLOGY CLINIC | Age: 65
End: 2020-02-14

## 2020-02-19 NOTE — PROGRESS NOTES
NPO after midnight  [employer] and drivers license  Wear comfortable clean clothing  Do not bring jewelry  Shower night before and morning of surgery with a liquid antibacterial soap  Bring  medications   Follow all instructions given by your physician   needed at discharge  Call [phone] for any questions

## 2020-02-26 ENCOUNTER — PREP FOR PROCEDURE (OUTPATIENT)
Dept: CARDIOLOGY | Age: 65
End: 2020-02-26

## 2020-02-26 RX ORDER — SODIUM CHLORIDE 0.9 % (FLUSH) 0.9 %
10 SYRINGE (ML) INJECTION EVERY 12 HOURS SCHEDULED
Status: CANCELLED | OUTPATIENT
Start: 2020-02-26

## 2020-02-26 RX ORDER — ASPIRIN 325 MG
325 TABLET ORAL ONCE
Status: CANCELLED | OUTPATIENT
Start: 2020-02-26 | End: 2020-02-26

## 2020-02-26 RX ORDER — NITROGLYCERIN 0.4 MG/1
0.4 TABLET SUBLINGUAL EVERY 5 MIN PRN
Status: CANCELLED | OUTPATIENT
Start: 2020-02-26

## 2020-02-26 RX ORDER — DIPHENHYDRAMINE HYDROCHLORIDE 50 MG/ML
50 INJECTION INTRAMUSCULAR; INTRAVENOUS ONCE
Status: CANCELLED | OUTPATIENT
Start: 2020-02-26 | End: 2020-02-26

## 2020-02-26 RX ORDER — SODIUM CHLORIDE 0.9 % (FLUSH) 0.9 %
10 SYRINGE (ML) INJECTION PRN
Status: CANCELLED | OUTPATIENT
Start: 2020-02-26

## 2020-02-26 RX ORDER — SODIUM CHLORIDE 9 MG/ML
INJECTION, SOLUTION INTRAVENOUS CONTINUOUS
Status: CANCELLED | OUTPATIENT
Start: 2020-02-26

## 2020-02-27 ENCOUNTER — HOSPITAL ENCOUNTER (OUTPATIENT)
Dept: INPATIENT UNIT | Age: 65
Discharge: HOME | End: 2020-02-27
Attending: NUCLEAR MEDICINE | Admitting: NUCLEAR MEDICINE
Payer: COMMERCIAL

## 2020-02-27 VITALS
TEMPERATURE: 97.8 F | RESPIRATION RATE: 19 BRPM | HEART RATE: 68 BPM | OXYGEN SATURATION: 95 % | WEIGHT: 182 LBS | DIASTOLIC BLOOD PRESSURE: 69 MMHG | BODY MASS INDEX: 28.56 KG/M2 | HEIGHT: 67 IN | SYSTOLIC BLOOD PRESSURE: 112 MMHG

## 2020-02-27 LAB
ABO: NORMAL
ANION GAP SERPL CALCULATED.3IONS-SCNC: 15 MEQ/L (ref 8–16)
ANTIBODY IDENTIFICATION: NORMAL
ANTIBODY SCREEN: NORMAL
APTT: 33.2 SECONDS (ref 22–38)
BUN BLDV-MCNC: 15 MG/DL (ref 7–22)
CALCIUM SERPL-MCNC: 10.1 MG/DL (ref 8.5–10.5)
CHLORIDE BLD-SCNC: 105 MEQ/L (ref 98–111)
CO2: 24 MEQ/L (ref 23–33)
CREAT SERPL-MCNC: 0.9 MG/DL (ref 0.4–1.2)
EKG ATRIAL RATE: 65 BPM
EKG P AXIS: -10 DEGREES
EKG P-R INTERVAL: 132 MS
EKG Q-T INTERVAL: 390 MS
EKG QRS DURATION: 76 MS
EKG QTC CALCULATION (BAZETT): 405 MS
EKG R AXIS: -7 DEGREES
EKG T AXIS: -14 DEGREES
EKG VENTRICULAR RATE: 65 BPM
ERYTHROCYTE [DISTWIDTH] IN BLOOD BY AUTOMATED COUNT: 12.7 % (ref 11.5–14.5)
ERYTHROCYTE [DISTWIDTH] IN BLOOD BY AUTOMATED COUNT: 43.9 FL (ref 35–45)
GFR SERPL CREATININE-BSD FRML MDRD: 84 ML/MIN/1.73M2
GLUCOSE BLD-MCNC: 109 MG/DL (ref 70–108)
HCT VFR BLD CALC: 46.5 % (ref 42–52)
HEMOGLOBIN: 15 GM/DL (ref 14–18)
INDIRECT COOMBS: NORMAL
INR BLD: 1.03 (ref 0.85–1.13)
MCH RBC QN AUTO: 30.7 PG (ref 26–33)
MCHC RBC AUTO-ENTMCNC: 32.3 GM/DL (ref 32.2–35.5)
MCV RBC AUTO: 95.1 FL (ref 80–94)
PLATELET # BLD: 180 THOU/MM3 (ref 130–400)
PMV BLD AUTO: 11.2 FL (ref 9.4–12.4)
POTASSIUM REFLEX MAGNESIUM: 4.4 MEQ/L (ref 3.5–5.2)
RBC # BLD: 4.89 MILL/MM3 (ref 4.7–6.1)
RH FACTOR: NORMAL
SODIUM BLD-SCNC: 144 MEQ/L (ref 135–145)
WBC # BLD: 5.3 THOU/MM3 (ref 4.8–10.8)

## 2020-02-27 PROCEDURE — 36415 COLL VENOUS BLD VENIPUNCTURE: CPT

## 2020-02-27 PROCEDURE — 6360000002 HC RX W HCPCS

## 2020-02-27 PROCEDURE — 86870 RBC ANTIBODY IDENTIFICATION: CPT

## 2020-02-27 PROCEDURE — 93458 L HRT ARTERY/VENTRICLE ANGIO: CPT | Performed by: NUCLEAR MEDICINE

## 2020-02-27 PROCEDURE — 2500000003 HC RX 250 WO HCPCS

## 2020-02-27 PROCEDURE — 86885 COOMBS TEST INDIRECT QUAL: CPT

## 2020-02-27 PROCEDURE — 86850 RBC ANTIBODY SCREEN: CPT

## 2020-02-27 PROCEDURE — 86900 BLOOD TYPING SEROLOGIC ABO: CPT

## 2020-02-27 PROCEDURE — 86901 BLOOD TYPING SEROLOGIC RH(D): CPT

## 2020-02-27 PROCEDURE — C1769 GUIDE WIRE: HCPCS

## 2020-02-27 PROCEDURE — C1894 INTRO/SHEATH, NON-LASER: HCPCS

## 2020-02-27 PROCEDURE — 93005 ELECTROCARDIOGRAM TRACING: CPT | Performed by: NURSE PRACTITIONER

## 2020-02-27 PROCEDURE — 6360000004 HC RX CONTRAST MEDICATION: Performed by: NUCLEAR MEDICINE

## 2020-02-27 PROCEDURE — 85610 PROTHROMBIN TIME: CPT

## 2020-02-27 PROCEDURE — 2580000003 HC RX 258: Performed by: NURSE PRACTITIONER

## 2020-02-27 PROCEDURE — 80048 BASIC METABOLIC PNL TOTAL CA: CPT

## 2020-02-27 PROCEDURE — 2709999900 HC NON-CHARGEABLE SUPPLY

## 2020-02-27 PROCEDURE — 85730 THROMBOPLASTIN TIME PARTIAL: CPT

## 2020-02-27 PROCEDURE — 85027 COMPLETE CBC AUTOMATED: CPT

## 2020-02-27 RX ORDER — SODIUM CHLORIDE 0.9 % (FLUSH) 0.9 %
10 SYRINGE (ML) INJECTION PRN
Status: DISCONTINUED | OUTPATIENT
Start: 2020-02-27 | End: 2020-02-28 | Stop reason: HOSPADM

## 2020-02-27 RX ORDER — SODIUM CHLORIDE 9 MG/ML
INJECTION, SOLUTION INTRAVENOUS CONTINUOUS
Status: DISCONTINUED | OUTPATIENT
Start: 2020-02-27 | End: 2020-02-28 | Stop reason: HOSPADM

## 2020-02-27 RX ORDER — NITROGLYCERIN 0.4 MG/1
0.4 TABLET SUBLINGUAL EVERY 5 MIN PRN
Status: DISCONTINUED | OUTPATIENT
Start: 2020-02-27 | End: 2020-02-28 | Stop reason: HOSPADM

## 2020-02-27 RX ORDER — ACETAMINOPHEN 325 MG/1
650 TABLET ORAL EVERY 4 HOURS PRN
Status: DISCONTINUED | OUTPATIENT
Start: 2020-02-27 | End: 2020-02-28 | Stop reason: HOSPADM

## 2020-02-27 RX ORDER — ATROPINE SULFATE 0.4 MG/ML
0.5 AMPUL (ML) INJECTION
Status: DISCONTINUED | OUTPATIENT
Start: 2020-02-27 | End: 2020-02-27 | Stop reason: HOSPADM

## 2020-02-27 RX ORDER — ASPIRIN 325 MG
325 TABLET ORAL ONCE
Status: DISCONTINUED | OUTPATIENT
Start: 2020-02-27 | End: 2020-02-28 | Stop reason: HOSPADM

## 2020-02-27 RX ORDER — SODIUM CHLORIDE 0.9 % (FLUSH) 0.9 %
10 SYRINGE (ML) INJECTION EVERY 12 HOURS SCHEDULED
Status: DISCONTINUED | OUTPATIENT
Start: 2020-02-27 | End: 2020-02-28 | Stop reason: HOSPADM

## 2020-02-27 RX ADMIN — IOPAMIDOL 75 ML: 755 INJECTION, SOLUTION INTRAVENOUS at 16:41

## 2020-02-27 RX ADMIN — SODIUM CHLORIDE: 9 INJECTION, SOLUTION INTRAVENOUS at 13:36

## 2020-02-27 ASSESSMENT — PAIN SCALES - GENERAL
PAINLEVEL_OUTOF10: 0

## 2020-02-27 NOTE — PROGRESS NOTES
Patient admitted to [facility]  Ambulatory for Cardiac cath. Patient NPO. Patient accompanied by spouse. Vital signs obtained. Assessment and data collection intiated. Oriented to room. Policies and procedures for  explained. All questions answered with no further questions at this time. Fall prevention and safety precautions discussed with patient. Care plan reviewed with patient and spouse. Patient and spouse verbalize understanding of the plan of care and contribute to goal setting. Reviewed with the patient the planned procedure, cardiac cath, and patient verbalized the understanding. Questions and concerns addressed. Home medications with Patient. Patient instructed to not take any home medications without first checking with staff. Explained patients right to have family, representative or physician notified of their admission. Patient has Declined for physician to be notified. Patient has Declined for family/representative to be notified.

## 2020-02-27 NOTE — H&P
(NITROSTAT) SL tablet 0.4 mg, 0.4 mg, Sublingual, Q5 Min PRN, NEIL Leone CNP    sodium chloride flush 0.9 % injection 10 mL, 10 mL, Intravenous, 2 times per day, NEIL Leone CNP    sodium chloride flush 0.9 % injection 10 mL, 10 mL, Intravenous, PRN, NEIL Leone CNP  Prior to Admission medications    Medication Sig Start Date End Date Taking? Authorizing Provider   silodosin (RAPAFLO) 4 MG CAPS capsule TAKE 1 CAPSULE BY MOUTH EVERY EVENING 1/23/20  Yes Keyanna Lindquist PA-C   carvedilol (COREG CR) 10 MG CP24 extended release capsule TAKE 1 CAPSULE BY MOUTH DAILY WITH BREAKFAST 1/22/20  Yes Brian Alfaro MD   finasteride (PROSCAR) 5 MG tablet TAKE 1 TABLET BY MOUTH EVERY DAY 12/9/19  Yes Keyanna Lindquist PA-C   naproxen (NAPROSYN) 500 MG tablet Take 1 tablet by mouth 2 times daily as needed for Pain 11/20/19  Yes NEIL Masters CNP   irbesartan (AVAPRO) 300 MG tablet TAKE 1 TABLET BY MOUTH DAILY 11/11/19  Yes Brian Alfaro MD   Cyanocobalamin (B-12 PO) Take 1,500 mg by mouth    Yes Historical Provider, MD   Multiple Vitamins-Minerals (THERAPEUTIC MULTIVITAMIN-MINERALS) tablet Take 1 tablet by mouth daily   Yes Historical Provider, MD   aspirin 81 MG chewable tablet Take 81 mg by mouth daily. Yes Historical Provider, MD     Additional information:       VITAL SIGNS   There were no vitals filed for this visit.     PHYSICAL:   General: No acute distress  HEENT:  Unremarkable for age  Neck: without increased JVD, carotid pulses 2+ bilaterally without bruits  Heart: RRR, S1 & S2 WNL, S4 gallop, without murmurs or rubs    Lungs: Clear to auscultation    Abdomen: BS present, without HSM, masses, or tenderness    Extremities: without C,C,E.  Pulses 2+ bilaterally  Mental Status: Alert & Oriented        PLANNED PROCEDURE   [x]Cath  []PCI                []Pacemaker/AICD  []DREW             []Cardioversion []Peripheral angiography/PTA  []Other:      SEDATION  Planned agent:[x]Midazolam []Meperidine [x]Sublimaze []Morphine  []Diazepam  []Other:     ASA Classification:  []1 [x]2 []3 []4 []5  Class 1: A normal healthy patient  Class 2: Pt with mild to moderate systemic disease  Class 3: Severe systemic disease or disturbance  Class 4: Severe systemic disorders that are already life threatening. Class 5: Moribund pt with little chances of survival, for more than 24 hours. Mallampati I Airway Classification:   []1 [x]2 []3 []4    [x]Pre-procedure diagnostic studies complete and results available. Comment:    [x]Previous sedation/anesthesia experiences assessed. Comment:    [x]The patient is an appropriate candidate to undergo the planned procedure sedation and anesthesia. (Refer to nursing sedation/analgesia documentation record)  [x]Formulation and discussion of sedation/procedure plan, risks, and expectations with patient and/or responsible adult completed. [x]Patient examined immediately prior to the procedure.  (Refer to nursing sedation/analgesia documentation record)    Tonya Brand MD [facility]  Electronically signed 2/27/2020 at 2:41 PM

## 2020-02-28 ENCOUNTER — TELEPHONE (OUTPATIENT)
Dept: FAMILY MEDICINE CLINIC | Age: 65
End: 2020-02-28

## 2020-02-28 ENCOUNTER — TELEPHONE (OUTPATIENT)
Dept: CARDIOLOGY CLINIC | Age: 65
End: 2020-02-28

## 2020-02-28 PROCEDURE — 93010 ELECTROCARDIOGRAM REPORT: CPT | Performed by: INTERNAL MEDICINE

## 2020-02-28 NOTE — PROCEDURES
[address] [id number]                            CARDIAC CATHETERIZATION    PATIENT NAME: Angella Medina                   :        1955  MED REC NO:   [id number]                           ROOM:       0005  ACCOUNT NO:   [de-identified]                           ADMIT DATE: 2020  PROVIDER:     Lucinda Avilez M.D.    DATE OF PROCEDURE:  2020    CLINICAL HISTORY AND INDICATION:  This is a gentleman with history of  multiple coronary stents being evaluated for knee surgery; underwent a  stress test, showed ischemia. Referred for cardiac cath to evaluate  coronary anatomy. PROCEDURES:  1. Left heart cath, LV gram.  2.  Coronary angiogram, right and left. 3.  Sedation:  1 of Versed and 50 of fentanyl between 04:00 and 04:30  p.m. in my presence under my supervision. Blood loss less than 10 cc     PROCEDURE DETAILS:  Please refer to my catheterization detailed note. HEMODYNAMIC RESULTS AND LEFT VENTRICULOGRAM:  Left ventricular  end-diastolic pressure was 12 mmHg with no significant change before and  after contrast injection. No significant gradient across the aortic  valve to signify aortic stenosis. Left ventricular function was normal.  EF 55%. CORONARY ARTERIOGRAM RESULTS:  1. Left main:  Patent. Gives rise to left anterior descending and left  circumflex artery. 2.  Left anterior descending artery has mild nonobstructive disease,  relatively smaller sized vessel being left anterior descending; however,  no significant stenosis. 3.  Left circumflex artery has a stent proximally which is patent. Distally, the vessel has some aneurysmal dilatation and is pretty  ectatic with moderate disease, could be 50% to 60%. 4.  Right coronary artery has two stents proximally and distally. There  is diffuse nonobstructive 50% stenosis throughout the length of the  vessel. CONCLUSION:  1.   Nonobstructive moderate disease mainly in the RCA and the left  circumflex. 2.  Normal LV function. 3.  No complication. RECOMMENDATIONS:  At this point, medical treatment, risk factor  modification are recommended.         Adriano Gleason M.D.    D: 02/27/2020 17:40:21       T: 02/27/2020 19:08:16     [email or web address]  Job#: [id number]     Doc#: [id number]    CC:

## 2020-02-28 NOTE — PROGRESS NOTES
Discharged in stable condition per wheelchair to private car. Discharge instructions and medications reviewed and discussed. Patient and spouse verbalized understanding and denied further comments or questions at this time. Instructed to call Dr. Joanna Mathew office for follow up appointment. Message also left with office.

## 2020-03-09 ENCOUNTER — HOSPITAL ENCOUNTER (OUTPATIENT)
Dept: PHYSICAL THERAPY | Age: 65
Setting detail: THERAPIES SERIES
Discharge: HOME | End: 2020-03-09
Payer: COMMERCIAL

## 2020-03-09 PROCEDURE — 97161 PT EVAL LOW COMPLEX 20 MIN: CPT

## 2020-03-09 PROCEDURE — 97110 THERAPEUTIC EXERCISES: CPT

## 2020-03-09 ASSESSMENT — PAIN SCALES - GENERAL: PAINLEVEL_OUTOF10: 2

## 2020-03-09 ASSESSMENT — PAIN DESCRIPTION - LOCATION: LOCATION: KNEE

## 2020-03-09 ASSESSMENT — PAIN DESCRIPTION - ORIENTATION: ORIENTATION: LEFT

## 2020-03-09 NOTE — PROGRESS NOTES
** PLEASE SIGN, DATE AND TIME CERTIFICATION BELOW AND RETURN TO [facility] OUTPATIENT [facility]ILITATION (FAX #: [phone]). ATTEST/CO-SIGN IF ACCESSING VIA [employer]. THANK YOU.**    I certify that I have examined the patient below and determined that Physical Medicine and Rehabilitation service is necessary and that I approve the established plan of care for up to 90 days or as specifically noted. Attestation, signature or co-signature of physician indicates approval of certification requirements.    ________________________ ____________ __________  Physician Signature   Date   Time       [address]     Time In: 935  Time Out: 1025  Minutes: 50  Timed Code Treatment Minutes: 25 Minutes      AROM L knee 9- 70 degrees          Date: 3/9/2020  Patient Name: Estela Modi,  Gender:  male        CSN: [id number]   : 1955  (75 y.o.)  Referral Date : 20     Referring Practitioner: Dr. Bre Tuttle      Diagnosis: L knee primary OA, L knee parital knee replacement  Treatment Diagnosis: L knee pain, difficulty walking  Additional Pertinent Hx: see medical history questionnaire, reviewed meds and allergies       General:  PT Visit Information  Onset Date: 20  PT Insurance Information: [employer]- pays at 100%, 40 vistis per calendar year, modaltitie covered including massage  Total # of Visits Approved: 40  Total # of Visits to Date: 1  Plan of Care/Certification Expiration Date: 20  Progress Note Due Date: 20  Progress Note Counter: 1/10                Position Activity Restriction  Other position/activity restrictions: no driving when taking oxycodone       See Medical History Questionnaire for information related to allergies and medications.     Subjective:  Chart Reviewed: Yes  Patient assessed for rehabilitation services?: Yes  Response To Previous Treatment: Not applicable  Family / Caregiver

## 2020-03-11 ENCOUNTER — HOSPITAL ENCOUNTER (OUTPATIENT)
Dept: PHYSICAL THERAPY | Age: 65
Setting detail: THERAPIES SERIES
Discharge: HOME | End: 2020-03-11
Payer: COMMERCIAL

## 2020-03-11 PROCEDURE — 97110 THERAPEUTIC EXERCISES: CPT

## 2020-03-11 ASSESSMENT — PAIN DESCRIPTION - ORIENTATION: ORIENTATION: LEFT

## 2020-03-11 ASSESSMENT — PAIN SCALES - GENERAL: PAINLEVEL_OUTOF10: 5

## 2020-03-11 ASSESSMENT — PAIN DESCRIPTION - LOCATION: LOCATION: KNEE

## 2020-03-11 NOTE — PROGRESS NOTES
[address] PHYSICAL THERAPY  DAILY NOTE  [facility].     Time In: 5072  Time Out: 1600  Minutes: 31  Timed Code Treatment Minutes: 31 Minutes                Date: 3/11/2020  Patient Name: Lacey Lucas,  Gender:  male        CSN: [id number]   : 1955  (75 y.o.)  Referral Date : 20    Referring Practitioner: Dr. Jewel Barrera      Diagnosis: L knee primary OA, L knee parital knee replacement  Treatment Diagnosis: L knee pain, difficulty walking   Additional Pertinent Hx: see medical history questionnaire, reviewed meds and allergies                  General:  PT Visit Information  Onset Date: 20  PT Insurance Information: [employer]- pays at 100%, 40 vistis per calendar year, modaltitie covered including massage  Total # of Visits Approved: 40  Total # of Visits to Date: 2  Plan of Care/Certification Expiration Date: 20  Progress Note Counter: 2/10 for PN. Subjective:  Family / Caregiver Present: No  Comments: 1/10 for PN. cert due .  f/u with MD on 2020     Subjective: Patient reporting pain level 5/10 today and reporting no other complains. Pain:  Patient Currently in Pain: Yes  Pain Assessment: 0-10  Pain Level: 5  Pain Location: Knee  Pain Orientation: Left      Objective  Exercises  Exercise 1: BIke seat 5 x 5 minutes rocking   Exercise 2: heel slide with strap 15 x calf stretch with strap 10-15 seconds x 5 on L  Exercise 3: quad set towel roll under ankle 15 x 5 seconds (AROM L knee 2- 88 degrees)  Exercise 4: SLR 10 x 5 seconds  Exercise 5: SAQ 10 x 5 seconds  Exercise 7: seated knee flexion 10 x 5 seconds  Exercise 8: heel/toe raises x 10  Exercise 9: marching x 10  Exercise 10: total gym x 15         Activity Tolerance:  Activity Tolerance: Patient Tolerated treatment well    Assessment:   Body structures, Functions, Activity limitations: Decreased ADL status, Decreased ROM  Assessment: Progressed with reps

## 2020-03-12 ENCOUNTER — HOSPITAL ENCOUNTER (OUTPATIENT)
Dept: PHYSICAL THERAPY | Age: 65
Setting detail: THERAPIES SERIES
Discharge: HOME | End: 2020-03-12
Payer: COMMERCIAL

## 2020-03-12 PROCEDURE — 97110 THERAPEUTIC EXERCISES: CPT

## 2020-03-12 NOTE — PROGRESS NOTES
[address] [address]  OUTPATIENT PHYSICAL THERAPY  DAILY NOTE  [address] [facility].     Time In: 0800  Time Out: 0830  Minutes: 30  Timed Code Treatment Minutes: 30 Minutes                Date: 3/12/2020  Patient Name: Shay Brian,  Gender:  male        CSN: [id number]   : 1955  (75 y.o.)  Referral Date : 20    Referring Practitioner: Dr. Nica Bridges      Diagnosis: L knee primary OA, L knee parital knee replacement  Treatment Diagnosis: L knee pain, difficulty walking   Additional Pertinent Hx: see medical history questionnaire, reviewed meds and allergies                  General:  PT Visit Information  Onset Date: 20  PT Insurance Information: [employer]- pays at 100%, 40 vistis per calendar year, modaltitie covered including massage  Total # of Visits Approved: 40  Total # of Visits to Date: 3  Plan of Care/Certification Expiration Date: 20  Progress Note Due Date: 20  Progress Note Counter: 3/10 for PN. Subjective:  Family / Caregiver Present: No  Comments: 1/10 for PN. cert due .  f/u with MD on 3/20/2020     Subjective: Patient reporting pain levle 5/10 today and reporting no other complains. Pain:  Patient Currently in Pain: Yes         Objective                                                                                                                          Exercises  Exercise 1: BIke seat 5 x 5 minutes rocking   Exercise 2: heel slide with strap 20 x L  Exercise 3: quad set bolster under ankle 15 x 5 seconds (AROM L knee 0- 91 degrees)  Exercise 4: SLR  2 x 10 x 5 seconds  Exercise 5: seated knee flexion 2 x  10 x 5 seconds  Exercise 6: LAQ 2 x 10 x 5 seconds  Exercise 7: heel  raises x 15  Exercise 8: small knee bend 15 x  Exercise 9: marching x 15  Exercise 10: total gym x 20         Activity Tolerance:  Activity Tolerance: Patient Tolerated treatment well    Assessment:   Body structures, Functions, Activity limitations: Decreased ADL status, Decreased ROM  Assessment: increased reps where noted, unable to do 2 x 10 LAQ but did 2 x 10 with all other exercises  Prognosis: Excellent  REQUIRES PT FOLLOW UP: Yes    Patient Education:  PT Education: Goals, PT Role, Plan of Care, Home Exercise Program  Patient Education: handout of above HEP  Barriers to Learning: none                      Plan:  Times per week: 2 x per week  Plan weeks: 8 weeks  Specific instructions for Next Treatment: Bike, AROM, strengthening, stretching, progress to steps, standing, strengthening, total gym  Current Treatment Recommendations: Strengthening, ROM, Balance Training, Gait Training, Stair training, Home Exercise Program, Patient/Caregiver Education & Training, Safety Education & Training    Goals:  Patient goals : to get back to work    Short term goals  Time Frame for Short term goals: 4 weeks  Short term goal 1: increase AROM L knee 2- 100 degrees, hip flexion to 45 degrees to allow patient to get left leg in/out of car without using arms to help  Short term goal 2: increase strength L LE to 3+/5 to allow patient to go up and down full flight of steps to 2nd floor bedroom non-reciprocally with cane  Short term goal 3: I with HEP as prescribed to allow patient to report able to walk x to and from wood shop to check equipment    Long term goals  Time Frame for Long term goals : 8 weeks  Long term goal 1: increase AROM L hip flexion to 70, knee 0- 120 degrees to allow patient to report able to return to driving with no difficulty getting in/out of car or driving x 30 minutes  Long term goal 2: increase strength L LE to 4/5 to allow patient to go up and down step reciprocally with 1 HR without cane with no difficulty  Long term goal 3: I with HEP as prescribed to allow patient to be able to return to sleeping in 2nd floor bedroom x 4hours without awakening due to L knee pain    Clay Mock # [id number]

## 2020-03-16 ENCOUNTER — HOSPITAL ENCOUNTER (OUTPATIENT)
Dept: PHYSICAL THERAPY | Age: 65
Setting detail: THERAPIES SERIES
Discharge: HOME | End: 2020-03-16
Payer: COMMERCIAL

## 2020-03-16 PROCEDURE — 97110 THERAPEUTIC EXERCISES: CPT

## 2020-03-16 ASSESSMENT — PAIN DESCRIPTION - ORIENTATION: ORIENTATION: LEFT

## 2020-03-16 ASSESSMENT — PAIN DESCRIPTION - LOCATION: LOCATION: KNEE

## 2020-03-16 ASSESSMENT — PAIN SCALES - GENERAL: PAINLEVEL_OUTOF10: 4

## 2020-03-18 ENCOUNTER — HOSPITAL ENCOUNTER (OUTPATIENT)
Dept: PHYSICAL THERAPY | Age: 65
Setting detail: THERAPIES SERIES
Discharge: HOME | End: 2020-03-18
Payer: COMMERCIAL

## 2020-03-18 PROCEDURE — 97110 THERAPEUTIC EXERCISES: CPT

## 2020-03-18 ASSESSMENT — PAIN SCALES - GENERAL: PAINLEVEL_OUTOF10: 2

## 2020-03-18 ASSESSMENT — PAIN DESCRIPTION - ORIENTATION: ORIENTATION: LEFT

## 2020-03-18 ASSESSMENT — PAIN DESCRIPTION - LOCATION: LOCATION: KNEE

## 2020-03-18 NOTE — PROGRESS NOTES
step up forward and lateral 4 inch 10 x each  Exercise 10: total gym x 25       Activity Tolerance:  Activity Tolerance: Patient Tolerated treatment well    Assessment:   Body structures, Functions, Activity limitations: Decreased ADL status, Decreased ROM  Assessment: increased flexion AROM to 105 degrees, increased reps where noted, increased step height  Prognosis: Excellent  REQUIRES PT FOLLOW UP: Yes    Patient Education:  PT Education: Goals, PT Role, Plan of Care, Home Exercise Program  Patient Education: handout of above HEP  Barriers to Learning: none                      Plan:  Times per week: 2 x per week  Plan weeks: 8 weeks  Specific instructions for Next Treatment: Bike, AROM, strengthening, stretching, progress to steps, standing, strengthening, total gym  Current Treatment Recommendations: Strengthening, ROM, Balance Training, Gait Training, Stair training, Home Exercise Program, Patient/Caregiver Education & Training, Safety Education & Training    Goals:  Patient goals : to get back to work    Short term goals  Time Frame for Short term goals: 4 weeks  Short term goal 1: increase AROM L knee 2- 100 degrees, hip flexion to 45 degrees to allow patient to get left leg in/out of car without using arms to help  Short term goal 2: increase strength L LE to 3+/5 to allow patient to go up and down full flight of steps to 2nd floor bedroom non-reciprocally with cane  Short term goal 3: I with HEP as prescribed to allow patient to report able to walk x to and from wood shop to check equipment    Long term goals  Time Frame for Long term goals : 8 weeks  Long term goal 1: increase AROM L hip flexion to 70, knee 0- 120 degrees to allow patient to report able to return to driving with no difficulty getting in/out of car or driving x 30 minutes  Long term goal 2: increase strength L LE to 4/5 to allow patient to go up and down step reciprocally with 1 HR without cane with no difficulty  Long term goal 3: I with

## 2020-03-19 ENCOUNTER — HOSPITAL ENCOUNTER (OUTPATIENT)
Dept: PHYSICAL THERAPY | Age: 65
Setting detail: THERAPIES SERIES
Discharge: HOME | End: 2020-03-19
Payer: COMMERCIAL

## 2020-03-19 PROCEDURE — 97110 THERAPEUTIC EXERCISES: CPT

## 2020-03-19 ASSESSMENT — PAIN SCALES - GENERAL: PAINLEVEL_OUTOF10: 2

## 2020-03-19 ASSESSMENT — PAIN DESCRIPTION - ORIENTATION: ORIENTATION: LEFT

## 2020-03-19 NOTE — PROGRESS NOTES
Tolerance:  Activity Tolerance: Patient Tolerated treatment well    Assessment:   Body structures, Functions, Activity limitations: Decreased ADL status, Decreased ROM  Assessment: increased flexion AROM to 105 degrees, increased reps where noted, increased step height  Prognosis: Excellent  REQUIRES PT FOLLOW UP: Yes    Patient Education:  PT Education: Goals, PT Role, Plan of Care, Home Exercise Program  Patient Education: handout of above HEP  Barriers to Learning: none                      Plan:  Times per week: 2 x per week  Plan weeks: 8 weeks  Specific instructions for Next Treatment: Bike, AROM, strengthening, stretching, progress to steps, standing, strengthening, total gym  Current Treatment Recommendations: Strengthening, ROM, Balance Training, Gait Training, Stair training, Home Exercise Program, Patient/Caregiver Education & Training, Safety Education & Training    Goals:  Patient goals : to get back to work    Short term goals  Time Frame for Short term goals: 4 weeks  Short term goal 1: increase AROM L knee 2- 100 degrees, hip flexion to 45 degrees to allow patient to get left leg in/out of car without using arms to help  Short term goal 2: increase strength L LE to 3+/5 to allow patient to go up and down full flight of steps to 2nd floor bedroom non-reciprocally with cane  Short term goal 3: I with HEP as prescribed to allow patient to report able to walk x to and from wood shop to check equipment    Long term goals  Time Frame for Long term goals : 8 weeks  Long term goal 1: increase AROM L hip flexion to 70, knee 0- 120 degrees to allow patient to report able to return to driving with no difficulty getting in/out of car or driving x 30 minutes  Long term goal 2: increase strength L LE to 4/5 to allow patient to go up and down step reciprocally with 1 HR without cane with no difficulty  Long term goal 3: I with HEP as prescribed to allow patient to be able to return to sleeping in 2nd floor bedroom x

## 2020-03-23 ENCOUNTER — HOSPITAL ENCOUNTER (OUTPATIENT)
Dept: PHYSICAL THERAPY | Age: 65
Setting detail: THERAPIES SERIES
Discharge: HOME | End: 2020-03-23
Payer: COMMERCIAL

## 2020-03-23 ENCOUNTER — APPOINTMENT (OUTPATIENT)
Dept: PHYSICAL THERAPY | Age: 65
End: 2020-03-23
Payer: COMMERCIAL

## 2020-03-23 PROCEDURE — 97110 THERAPEUTIC EXERCISES: CPT

## 2020-03-24 RX ORDER — FINASTERIDE 5 MG/1
TABLET, FILM COATED ORAL
Qty: 90 TABLET | Refills: 0 | Status: SHIPPED | OUTPATIENT
Start: 2020-03-24 | End: 2020-06-25

## 2020-03-24 NOTE — TELEPHONE ENCOUNTER
Prescription Refill Request    The pharmacy is requesting a refill of proscar 5 mg, taken daily  Last prescribed on 12/09/2019    Last office visit 08/22/2019 with provider Keith ROSENBAUM  Next office visit 08/27/2020 with provider Keith ROSENBAUM    Please send refill to [facility]    Thank you.

## 2020-03-25 ENCOUNTER — HOSPITAL ENCOUNTER (OUTPATIENT)
Dept: PHYSICAL THERAPY | Age: 65
Setting detail: THERAPIES SERIES
Discharge: HOME | End: 2020-03-25
Payer: COMMERCIAL

## 2020-03-25 ENCOUNTER — APPOINTMENT (OUTPATIENT)
Dept: PHYSICAL THERAPY | Age: 65
End: 2020-03-25
Payer: COMMERCIAL

## 2020-03-25 PROCEDURE — 97110 THERAPEUTIC EXERCISES: CPT

## 2020-03-25 ASSESSMENT — PAIN DESCRIPTION - LOCATION: LOCATION: KNEE

## 2020-03-25 ASSESSMENT — PAIN SCALES - GENERAL: PAINLEVEL_OUTOF10: 1

## 2020-03-25 ASSESSMENT — PAIN DESCRIPTION - ORIENTATION: ORIENTATION: LEFT

## 2020-03-25 NOTE — PROGRESS NOTES
[address] [facility] PHYSICAL THERAPY  DAILY NOTE  [address] [facility].     Time In: 1000  Time Out: [address]  Minutes: 40  Timed Code Treatment Minutes: 40 Minutes                Date: 3/25/2020  Patient Name: Maritza Grimes,  Gender:  male        CSN: [id number]   : 1955  (75 y.o.)  Referral Date : 20    Referring Practitioner: Dr. Festus Bragg      Diagnosis: L knee primary OA, L knee parital knee replacement  Treatment Diagnosis: L knee pain, difficulty walking   Additional Pertinent Hx: see medical history questionnaire, reviewed meds and allergies                  General:  PT Visit Information  Onset Date: 20  PT Insurance Information: [employer]- pays at 100%, 40 vistis per calendar year, modaltitie covered including massage  Total # of Visits Approved: 40  Total # of Visits to Date: 8  Plan of Care/Certification Expiration Date: 20  Progress Note Due Date: 20  Progress Note Counter: 8/10 for PN. Subjective:  Chart Reviewed: Yes  Patient assessed for rehabilitation services?: Yes  Response To Previous Treatment: Not applicable  Family / Caregiver Present: No  Comments: 6/10 for PN.   cert due .  f/u with MD on  but OK to cancel if not needed, estimated RTW      Subjective: soreness 1/10 in knee, using cane only when out of house     Pain:  Patient Currently in Pain: Yes  Pain Assessment: 0-10  Pain Level: 1  Pain Location: Knee  Pain Orientation: Left      Objective                                                                                                                          Exercises  Exercise 1: TEMP PRIOR TO SESSION  97.6  f  BIke seat 5 x 5 minutes full revolution  Exercise 2: heel slide with strap 30 x L  Exercise 3: quad set bolster under ankle 2 x 15 x 5 seconds (AROM L knee 0- 116 degrees)  Exercise 4: SLR  2 x 15,  x 5 seconds  Exercise 5: seated knee flexion 1 x 15, R ankle crossed over leftx 5

## 2020-03-27 ENCOUNTER — APPOINTMENT (OUTPATIENT)
Dept: PHYSICAL THERAPY | Age: 65
End: 2020-03-27
Payer: COMMERCIAL

## 2020-03-27 ENCOUNTER — HOSPITAL ENCOUNTER (OUTPATIENT)
Dept: PHYSICAL THERAPY | Age: 65
Setting detail: THERAPIES SERIES
Discharge: HOME | End: 2020-03-27
Payer: COMMERCIAL

## 2020-03-27 PROCEDURE — 97110 THERAPEUTIC EXERCISES: CPT

## 2020-03-27 ASSESSMENT — PAIN SCALES - GENERAL: PAINLEVEL_OUTOF10: 2

## 2020-03-27 ASSESSMENT — PAIN DESCRIPTION - ORIENTATION: ORIENTATION: LEFT

## 2020-03-27 ASSESSMENT — PAIN DESCRIPTION - LOCATION: LOCATION: KNEE

## 2020-03-27 NOTE — PROGRESS NOTES
15, R ankle crossed over left x 5 seconds  Exercise 6: LAQ 2 x 15,  x 5 seconds  Exercise 7: heel  raises x 25  Exercise 8: small knee bend 25x  Exercise 9: step up forward and lateral 4 inch 25 x each  Exercise 10: step flexion stretch 10 x 5 seconds cues for light stretch onlhy  Exercise 11: rockerboard forward/back, side/side 15 x each  Exercise 12: total gym x 30         Activity Tolerance:  Activity Tolerance: Patient Tolerated treatment well    Assessment: Body structures, Functions, Activity limitations: Decreased ADL status, Decreased ROM  Assessment: perhaps pushed too hard on step stretch last visit so did more lightly today.   also added RB with no increased pain  Prognosis: Excellent  REQUIRES PT FOLLOW UP: Yes    Patient Education:  PT Education: Goals, PT Role, Plan of Care, Home Exercise Program  Patient Education: handout of above HEP  Barriers to Learning: none                      Plan:  Times per week: 2 x per week  Plan weeks: 8 weeks  Specific instructions for Next Treatment: Bike, AROM, strengthening, stretching, progress to steps, standing, strengthening, total gym  Current Treatment Recommendations: Strengthening, ROM, Balance Training, Gait Training, Stair training, Home Exercise Program, Patient/Caregiver Education & Training, Safety Education & Training    Goals:  Patient goals : to get back to work    Short term goals  Time Frame for Short term goals: 4 weeks  Short term goal 1: increase AROM L knee 2- 100 degrees, hip flexion to 45 degrees to allow patient to get left leg in/out of car without using arms to help  Short term goal 2: increase strength L LE to 3+/5 to allow patient to go up and down full flight of steps to 2nd floor bedroom non-reciprocally with cane  Short term goal 3: I with HEP as prescribed to allow patient to report able to walk x to and from [employer] shop to check equipment    Long term goals  Time Frame for Long term goals : 8 weeks  Long term goal 1: increase AROM L hip flexion to 70, knee 0- 120 degrees to allow patient to report able to return to driving with no difficulty getting in/out of car or driving x 30 minutes  Long term goal 2: increase strength L LE to 4/5 to allow patient to go up and down step reciprocally with 1 HR without cane with no difficulty  Long term goal 3: I with HEP as prescribed to allow patient to be able to return to sleeping in 2nd floor bedroom x 4hours without awakening due to L knee pain    Idalia Sherwood.  Yissel Rack # [id number]

## 2020-03-30 ENCOUNTER — HOSPITAL ENCOUNTER (OUTPATIENT)
Dept: PHYSICAL THERAPY | Age: 65
Setting detail: THERAPIES SERIES
Discharge: HOME | End: 2020-03-30
Payer: COMMERCIAL

## 2020-03-30 ENCOUNTER — APPOINTMENT (OUTPATIENT)
Dept: PHYSICAL THERAPY | Age: 65
End: 2020-03-30
Payer: COMMERCIAL

## 2020-03-30 PROCEDURE — 97110 THERAPEUTIC EXERCISES: CPT

## 2020-03-30 ASSESSMENT — PAIN DESCRIPTION - ORIENTATION: ORIENTATION: LEFT

## 2020-03-30 ASSESSMENT — PAIN DESCRIPTION - LOCATION: LOCATION: KNEE

## 2020-03-30 ASSESSMENT — PAIN SCALES - GENERAL: PAINLEVEL_OUTOF10: 2

## 2020-03-30 NOTE — PROGRESS NOTES
x 30, heel raises x 15          Activity Tolerance:  Activity Tolerance: Patient Tolerated treatment well    Assessment: Body structures, Functions, Activity limitations: Decreased ADL status, Decreased ROM  Assessment: Added reps to total gym and heel raises with no complains. Patient with no complains at end of session.   Prognosis: Excellent       Patient Education:try desensitization to knee                         Plan:  Times per week: 2 x per week  Plan weeks: 8 weeks  Specific instructions for Next Treatment: Bike, AROM, strengthening, stretching, progress to steps, standing, strengthening, total gym  Current Treatment Recommendations: Strengthening, ROM, Balance Training, Gait Training, Stair training, Home Exercise Program, Patient/Caregiver Education & Training, Safety Education & Training    Goals:  Patient goals : to get back to work    Short term goals  Time Frame for Short term goals: 4 weeks  Short term goal 1: increase AROM L knee 2- 100 degrees, hip flexion to 45 degrees to allow patient to get left leg in/out of car without using arms to help  Short term goal 2: increase strength L LE to 3+/5 to allow patient to go up and down full flight of steps to 2nd floor bedroom non-reciprocally with cane  Short term goal 3: I with HEP as prescribed to allow patient to report able to walk x to and from [employer] to check equipment    Long term goals  Time Frame for Long term goals : 8 weeks  Long term goal 1: increase AROM L hip flexion to 70, knee 0- 120 degrees to allow patient to report able to return to driving with no difficulty getting in/out of car or driving x 30 minutes  Long term goal 2: increase strength L LE to 4/5 to allow patient to go up and down step reciprocally with 1 HR without cane with no difficulty  Long term goal 3: I with HEP as prescribed to allow patient to be able to return to sleeping in 2nd floor bedroom x 4hours without awakening due to L knee pain    Tristin Mccarthy, [id number]

## 2020-03-31 NOTE — PROGRESS NOTES
[facility]         Patient seen by PTA for 10th visit. Progress note being done off of PTAs last note so will not be able to address all the goals due to were not addressed by PTA. Date: 3/31/2020  Patient Name: Hanna Badillo,  Gender:  male        CSN: [id number]   : 1955  (75 y.o.)  Referral Date : 20    Referring Practitioner: Dr. Rashad Dumont      Diagnosis: L knee primary OA, L knee parital knee replacement  Treatment Diagnosis: L knee pain, difficulty walking   Additional Pertinent Hx: see medical history questionnaire, reviewed meds and allergies                  General:  PT Visit Information  Onset Date: 20  PT Insurance Information: [employer]- pays at 100%, 40 vistis per calendar year, modaltitie covered including massage  Total # of Visits Approved: 40  Total # of Visits to Date: 10  Plan of Care/Certification Expiration Date: 20  Progress Note Counter: 10/10 for PN. Assessment: Patient is making progress with therapy and working towards meeting goals. Patient would benefit from another 2-4 weeks of therapy to address remaining strength and gait issues and return him to full functional mobility.     Plan:  Times per week: 2 x per week  Plan weeks: 8 weeks  Specific instructions for Next Treatment: Bike, AROM, strengthening, stretching, progress to steps, standing, strengthening, total gym  Current Treatment Recommendations: Strengthening, ROM, Balance Training, Gait Training, Stair training, Home Exercise Program, Patient/Caregiver Education & Training, Safety Education & Training    Goals:  Patient goals : to get back to work    Short term goals  Time Frame for Short term goals: 4 weeks  Short term goal 1: increase AROM L knee 2- 100 degrees, hip flexion to 45 degrees to allow patient to get left leg in/out of car without using arms to help - NOT MET Patient has met goals for knee but testing of hip was not done. 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 Long term goals  Time Frame for Long term goals : 8 weeks  Long term goal 1: increase AROM L hip flexion to 70, knee 0- 120 degrees to allow patient to report able to return to driving with no difficulty getting in/out of car or driving x 30 minutes - NOT MET Patient has met goals foe knee but testing of hip was not done.   Long term goal 2: increase strength L LE to 4/5 to allow patient to go up and down step reciprocally with 1 HR without cane with no difficulty - NOT MET See above  Long term goal 3: I with HEP as prescribed to allow patient to be able to return to sleeping in 2nd floor bedroom x 4hours without awakening due to L knee pain - NOT MET See above    Revised Short-Term Goals:    Short term goals  Time Frame for Short term goals: 8 weeks  Short term goal 1: See LTG  Short term goal 2:    Short term goal 3:      Revised Long-Term Goals  Long term goals  Time Frame for Long term goals : 8 weeks  Long term goal 1: increase AROM L hip flexion to 70, continue to demonstrate knee 0- 120 degrees to allow patient to report able to return to driving with no difficulty getting in/out of car or driving x 30 minutes  Long term goal 2: increase strength L LE to 4/5 to allow patient to go up and down step reciprocally with 1 HR without cane with no difficulty  Long term goal 3: I with HEP as prescribed to allow patient to be able to return to sleeping in 2nd floor bedroom x 4hours without awakening due to L knee pain   Long term goal 4: I with HEP as prescribed to allow patient to report able to walk x to and from wood shop to check equipment    [address], [address] No complaints of dizziness. Pt AxOx4. VS stable - temp 98.3, /51, HR 84, O2 99% and respirations 17 Pt c/o abd pain 7/10 and said doesn't feel good Pt c/t deny gas since sx. C/o gas pain, mylicon administered

## 2020-04-01 ENCOUNTER — HOSPITAL ENCOUNTER (OUTPATIENT)
Dept: PHYSICAL THERAPY | Age: 65
Setting detail: THERAPIES SERIES
Discharge: HOME | End: 2020-04-01
Payer: COMMERCIAL

## 2020-04-01 ENCOUNTER — APPOINTMENT (OUTPATIENT)
Dept: PHYSICAL THERAPY | Age: 65
End: 2020-04-01
Payer: COMMERCIAL

## 2020-04-01 PROCEDURE — 97110 THERAPEUTIC EXERCISES: CPT

## 2020-04-01 NOTE — PROGRESS NOTES
seconds each  Exercise 12: total gym x 30, heel raises x 15   Exercise 13: narrow stance on foam x 30 seconds, step stance and tandem stance bilat 30 seconds narrow stance on foam with head turns x 10 and looking up down x 10         Activity Tolerance:  Activity Tolerance: Patient Tolerated treatment well    Assessment: Body structures, Functions, Activity limitations: Decreased ADL status, Decreased ROM  Assessment: Continued to make progressions with balance with patient having difficulty but reporting no complains of increased pain at end of session. Prognosis: Excellent       Patient Education:continue with HEP.                          Plan:  Times per week: 2 x per week  Plan weeks: 8 weeks  Specific instructions for Next Treatment: Bike, AROM, strengthening, stretching, progress to steps, standing, strengthening, total gym  Current Treatment Recommendations: Strengthening, ROM, Balance Training, Gait Training, Stair training, Home Exercise Program, Patient/Caregiver Education & Training, Safety Education & Training    Goals:  Patient goals : to get back to work    Short term goals  Time Frame for Short term goals: 8 weeks  Short term goal 1: See LTG    Long term goals  Time Frame for Long term goals : 8 weeks  Long term goal 1: increase AROM L hip flexion to 70, continue to demonstrate knee 0- 120 degrees to allow patient to report able to return to driving with no difficulty getting in/out of car or driving x 30 minutes  Long term goal 2: increase strength L LE to 4/5 to allow patient to go up and down step reciprocally with 1 HR without cane with no difficulty  Long term goal 3: I with HEP as prescribed to allow patient to be able to return to sleeping in 2nd floor bedroom x 4hours without awakening due to L knee pain   Long term goal 4: I with HEP as prescribed to allow patient to report able to walk x to and from [employer] to check equipment    Dangelo Metz, [id number]

## 2020-04-03 ENCOUNTER — APPOINTMENT (OUTPATIENT)
Dept: PHYSICAL THERAPY | Age: 65
End: 2020-04-03
Payer: COMMERCIAL

## 2020-04-03 ENCOUNTER — HOSPITAL ENCOUNTER (OUTPATIENT)
Dept: PHYSICAL THERAPY | Age: 65
Setting detail: THERAPIES SERIES
Discharge: HOME | End: 2020-04-03
Payer: COMMERCIAL

## 2020-04-03 PROCEDURE — 97110 THERAPEUTIC EXERCISES: CPT

## 2020-04-06 ENCOUNTER — HOSPITAL ENCOUNTER (OUTPATIENT)
Dept: PHYSICAL THERAPY | Age: 65
Setting detail: THERAPIES SERIES
Discharge: HOME | End: 2020-04-06
Payer: COMMERCIAL

## 2020-04-06 ENCOUNTER — APPOINTMENT (OUTPATIENT)
Dept: PHYSICAL THERAPY | Age: 65
End: 2020-04-06
Payer: COMMERCIAL

## 2020-04-06 PROCEDURE — 97110 THERAPEUTIC EXERCISES: CPT

## 2020-04-08 ENCOUNTER — HOSPITAL ENCOUNTER (OUTPATIENT)
Dept: PHYSICAL THERAPY | Age: 65
Setting detail: THERAPIES SERIES
Discharge: HOME | End: 2020-04-08
Payer: COMMERCIAL

## 2020-04-08 ENCOUNTER — APPOINTMENT (OUTPATIENT)
Dept: PHYSICAL THERAPY | Age: 65
End: 2020-04-08
Payer: COMMERCIAL

## 2020-04-08 PROCEDURE — 97110 THERAPEUTIC EXERCISES: CPT

## 2020-04-10 ENCOUNTER — APPOINTMENT (OUTPATIENT)
Dept: PHYSICAL THERAPY | Age: 65
End: 2020-04-10
Payer: COMMERCIAL

## 2020-04-10 ENCOUNTER — HOSPITAL ENCOUNTER (OUTPATIENT)
Dept: PHYSICAL THERAPY | Age: 65
Setting detail: THERAPIES SERIES
Discharge: HOME | End: 2020-04-10
Payer: COMMERCIAL

## 2020-04-10 PROCEDURE — 97110 THERAPEUTIC EXERCISES: CPT

## 2020-04-10 NOTE — PROGRESS NOTES
as prescribed to allow patient to be able to return to sleeping in 2nd floor bedroom x 4hours without awakening due to L knee pain   Long term goal 4: I with HEP as prescribed to allow patient to report able to walk x to and from [employer] shop to check equipment    Nahun Antonio, [id number]

## 2020-04-14 ENCOUNTER — APPOINTMENT (OUTPATIENT)
Dept: PHYSICAL THERAPY | Age: 65
End: 2020-04-14
Payer: COMMERCIAL

## 2020-04-14 ENCOUNTER — HOSPITAL ENCOUNTER (OUTPATIENT)
Dept: PHYSICAL THERAPY | Age: 65
Setting detail: THERAPIES SERIES
Discharge: HOME | End: 2020-04-14
Payer: COMMERCIAL

## 2020-04-14 PROCEDURE — 97110 THERAPEUTIC EXERCISES: CPT

## 2020-04-15 ENCOUNTER — HOSPITAL ENCOUNTER (OUTPATIENT)
Dept: PHYSICAL THERAPY | Age: 65
Setting detail: THERAPIES SERIES
Discharge: HOME | End: 2020-04-15
Payer: COMMERCIAL

## 2020-04-15 ENCOUNTER — APPOINTMENT (OUTPATIENT)
Dept: PHYSICAL THERAPY | Age: 65
End: 2020-04-15
Payer: COMMERCIAL

## 2020-04-15 PROCEDURE — 97110 THERAPEUTIC EXERCISES: CPT

## 2020-04-15 NOTE — PROGRESS NOTES
Left hip flexion 73 degrees, Left knee flexion 130 degrees, and Left hip extension 0. Long term goal 2: Increase strength L LE to 4/5 to allow patient to go up and down step reciprocally with 1 HR without cane with no difficulty. MET: Patient Left LE strength 5/5 in all planes and able to go up/down 4 steps x2 trials with reciprocal pattern without UE assist.   Long term goal 3: I with HEP as prescribed to allow patient to be able to return to sleeping in 2nd floor bedroom x4 hours without awakening due to L knee pain. MET: Patient is able to go up/down steps and reports he is sleeping 4 hours or more at a time. Long term goal 4: I with HEP as prescribed to allow patient to report able to walk to and from wood shop to check equipment. MET: Patient reports compliance with HEP and is walking to and from his shop without difficulty. Vernal Pupa.  Brenda, [address], 4/15/2020

## 2020-04-17 ENCOUNTER — HOSPITAL ENCOUNTER (OUTPATIENT)
Dept: PHYSICAL THERAPY | Age: 65
Setting detail: THERAPIES SERIES
Discharge: HOME | End: 2020-04-17
Payer: COMMERCIAL

## 2020-04-17 ENCOUNTER — APPOINTMENT (OUTPATIENT)
Dept: PHYSICAL THERAPY | Age: 65
End: 2020-04-17
Payer: COMMERCIAL

## 2020-04-17 PROCEDURE — 97110 THERAPEUTIC EXERCISES: CPT

## 2020-04-17 NOTE — DISCHARGE SUMMARY
[facility]  OUTPATIENT PHYSICAL THERAPY  DISCHARGE NOTE  [address] [facility]      Discharge note done from information given in note done by PTA on 2020. Date: 2020  Patient Name: Mazin Uribe,  Gender:  male        CSN: [id number]   : 1955  (75 y.o.)  Referral Date : 20    Referring Practitioner: Dr. Millicent Reese      Diagnosis: L knee primary OA, L knee parital knee replacement  Treatment Diagnosis: L knee pain, difficulty walking   Additional Pertinent Hx: see medical history questionnaire, reviewed meds and allergies                  General:  PT Visit Information  Onset Date: 20  PT Insurance Information: [employer]- pays at 100%, 40 vistis per calendar year, modaltitie covered including massage  Total # of Visits Approved: 40  Total # of Visits to Date:   Plan of Care/Certification Expiration Date: 20  Progress Note Counter: 8/10 for PN. Assessment:  Prognosis: Excellent   Patient has met all his goals for therapy and is doing well. Patient able to be independent with HEP and no more therapy warranted at this time. Plan:  Discharge to [facility]    Goals:  Patient goals : To get back to work    Short term goals  Time Frame for Short term goals: 8 weeks  Short term goal 1: See LTG    Long term goals  Time Frame for Long term goals : 8 weeks  Long term goal 1: Increase AROM L hip flexion to 70, continue to demonstrate knee 0- 120 degrees to allow patient to report able to return to driving with no difficulty getting in/out of car or driving x 30 minutes. MET: Patient reports he is able to drive 30 minutes and not having difficulty getting in/out of a car. Drove to [facility] over the weekend. Left hip flexion 73 degrees, Left knee flexion 130 degrees, and Left hip extension 0. Long term goal 2: Increase strength L LE to 4/5 to allow patient to go up and down step reciprocally with 1 HR without cane with no difficulty.  MET:

## 2020-04-27 ENCOUNTER — OFFICE VISIT (OUTPATIENT)
Dept: CARDIOLOGY CLINIC | Age: 65
End: 2020-04-27
Payer: COMMERCIAL

## 2020-04-27 VITALS
HEIGHT: 67 IN | WEIGHT: 190.6 LBS | SYSTOLIC BLOOD PRESSURE: 124 MMHG | BODY MASS INDEX: 29.91 KG/M2 | HEART RATE: 64 BPM | DIASTOLIC BLOOD PRESSURE: 70 MMHG

## 2020-04-27 PROCEDURE — 99214 OFFICE O/P EST MOD 30 MIN: CPT | Performed by: NUCLEAR MEDICINE

## 2020-04-28 LAB
CHOLESTEROL/HDL RATIO: 3.8 (ref 1–5)
CHOLESTEROL: 196 MG/DL (ref 150–200)
HDLC SERPL-MCNC: 51 MG/DL
LDL CHOLESTEROL CALCULATED: 118 MG/DL
LDL/HDL RATIO: 2.3
TRIGL SERPL-MCNC: 135 MG/DL (ref 27–150)
VLDLC SERPL CALC-MCNC: 27 MG/DL (ref 0–30)

## 2020-06-25 RX ORDER — FINASTERIDE 5 MG/1
TABLET, FILM COATED ORAL
Qty: 90 TABLET | Refills: 0 | Status: SHIPPED | OUTPATIENT
Start: 2020-06-25 | End: 2020-09-25

## 2020-06-29 RX ORDER — CARVEDILOL PHOSPHATE 10 MG/1
CAPSULE, EXTENDED RELEASE ORAL
Qty: 90 CAPSULE | Refills: 1 | Status: SHIPPED | OUTPATIENT
Start: 2020-06-29 | End: 2020-12-23 | Stop reason: SDUPTHER

## 2020-09-10 LAB — PSA, ULTRASENSITIVE: 2.22 NG/ML (ref 0–4)

## 2020-09-13 NOTE — PROGRESS NOTES
Mr. Kaylan Flores is a 64-year-old male with a history of an elevated PSA. His PSA has ranged from 7.72-9.01 before starting Proscar therapy. A prostate biopsy was performed in September 2013, which was negative for malignancy. A PCA-3 level was obtained in October 2014 due to the persistence of his PSA and his value was 7 (negative). He was started on Proscar in October 2014 but was suboptimally controlled. He states that he tried Flomax in the past but discontinued due to orthostatic side effects and ineffectiveness of medication. He started Rapaflo in February 2016 and has noticed an improvement in his irritative and obstructive symptoms. In regards to his current urinary health, he reports minimal frequency, urgency, and weakened stream. His IPSS is 5 (mild). He is very pleased with his current urinary state. He denies fever/chills, flank pain, gross hematuria, dysuria, bowel irregularity. Of general medical concern, he denies dyspnea, chest pain, N/V, weight loss, night sweats, leg pain, or lightheadedness. He presents today for further evaluation.         Past Medical History:   Diagnosis Date    Arthritis     CAD (coronary artery disease)     Hyperlipidemia     Hypertension     MI, old        Past Surgical History:   Procedure Laterality Date    COLONOSCOPY      CORONARY ANGIOPLASTY WITH STENT PLACEMENT  2008    DIAGNOSTIC CARDIAC CATH LAB PROCEDURE  02/2020    JOINT REPLACEMENT Left 03/05/2020    partial knee replacement       Current Outpatient Medications on File Prior to Visit   Medication Sig Dispense Refill    carvedilol (COREG CR) 10 MG CP24 extended release capsule TAKE 1 CAPSULE BY MOUTH DAILY WITH BREAKFAST 90 capsule 1    finasteride (PROSCAR) 5 MG tablet TAKE 1 TABLET BY MOUTH EVERY DAY 90 tablet 0    silodosin (RAPAFLO) 4 MG CAPS capsule TAKE 1 CAPSULE BY MOUTH EVERY EVENING 30 capsule 11    irbesartan (AVAPRO) 300 MG tablet TAKE 1 TABLET BY MOUTH DAILY 90 tablet 3    Cyanocobalamin (B-12 PO) Take 1,500 mg by mouth       Multiple Vitamins-Minerals (THERAPEUTIC MULTIVITAMIN-MINERALS) tablet Take 1 tablet by mouth daily      aspirin 81 MG chewable tablet Take 81 mg by mouth daily. No current facility-administered medications on file prior to visit.         Allergies   Allergen Reactions    Azithromycin      hives    Pcn [Penicillins]      hives       Family History   Problem Relation Age of Onset    High Blood Pressure Mother     Heart Disease Mother     Heart Disease Father     Heart Attack Father     No Known Problems Sister     No Known Problems Brother     No Known Problems Brother        Social History     Socioeconomic History    Marital status:      Spouse name: Not on file    Number of children: Not on file    Years of education: Not on file    Highest education level: Not on file   Occupational History    Not on file   Social Needs    Financial resource strain: Not on file    Food insecurity     Worry: Not on file     Inability: Not on file    Transportation needs     Medical: Not on file     Non-medical: Not on file   Tobacco Use    Smoking status: Former Smoker     Packs/day: 0.25     Years: 2.00     Pack years: 0.50     Types: Cigarettes     Start date: 1973     Last attempt to quit: 1977     Years since quittin.7    Smokeless tobacco: Never Used   Substance and Sexual Activity    Alcohol use: No     Alcohol/week: 0.0 standard drinks    Drug use: No    Sexual activity: Not on file   Lifestyle    Physical activity     Days per week: Not on file     Minutes per session: Not on file    Stress: Not on file   Relationships    Social connections     Talks on phone: Not on file     Gets together: Not on file     Attends Mu-ism service: Not on file     Active member of club or organization: Not on file     Attends meetings of clubs or organizations: Not on file     Relationship status: Not on file    Intimate partner violence     Fear of current or ex partner: Not on file     Emotionally abused: Not on file     Physically abused: Not on file     Forced sexual activity: Not on file   Other Topics Concern    Not on file   Social History Narrative    Not on file       Review of Systems  No problems with ears, nose or throat.  No problems with eyes.  No chest pain, shortness of breath, abdominal pain, extremity pain or weakness, and no neurological deficits.  No rashes.  No swollen glands or lymph nodes.   symptoms per HPI.  The remainder of the review of symptoms is negative.     Exam    Temp 97 °F (36.1 °C)   Ht 5' 7\" (1.702 m)   Wt 194 lb (88 kg)   BMI 30.38 kg/m²      Constitutional: He is oriented to person, place, and time. He appears well-developed and well-nourished.    HENT:    Head: Normocephalic and atraumatic. Cardiovascular: Normal rate. Well perfused. Pulmonary/Chest: Effort normal. No audible wheezes. Abdominal: Soft. Non-distended. : Patient declined examination today. Neurological: He is alert and oriented to person, place, and time. No focal deficits. Skin: Skin is warm and dry. Psychiatric: He has a normal mood and affect.  His behavior is normal.    Labs    Results for POC orders placed in visit on 09/14/20   POCT Urinalysis No Micro (Auto)   Result Value Ref Range    Glucose, Ur Negative NEGATIVE mg/dl    Bilirubin Urine Negative     Ketones, Urine Negative NEGATIVE    Specific Gravity, Urine 1.025 1.002 - 1.030    Blood, UA POC Trace-lysed NEGATIVE    pH, Urine 5.00 5.0 - 9.0    Protein, Urine Negative NEGATIVE mg/dl    Urobilinogen, Urine 0.20 0.0 - 1.0 eu/dl    Nitrite, Urine Negative NEGATIVE    Leukocyte Clumps, Urine Negative NEGATIVE    Color, Urine Yellow [id number]    Character, Urine Clear [id number].[id number]   poct post void residual   Result Value Ref Range    post void residual 30 ml       Lab Results   Component Value Date    CREATININE 0.9 02/27/2020    BUN 15 02/27/2020     02/27/2020    K 4.4 02/27/2020     02/27/2020    CO2 24 02/27/2020       Lab Results   Component Value Date    PSA 2.21 (H) 08/20/2019    PSA 3.54 (H) 06/30/2018    PSA 2.69 (H) 08/04/2017     PSA (9/20): 2.22    Plan:  1. BPH with obstruction- Improved. Patient has taken Flomax in the past and experienced orthostatic side effects and the medication proved to be ineffective. Currently on Rapaflo 4 mg nightly along with Proscar 5 mg daily. Patient reports considerable clinical improvement with dual therapy with no side effects. Will schedule one year follow up appointment per patient request. Call with questions or concerns in the meantime.      2. History of elevated PSA- PSA has decreased from 7.72 to 2.80 after initiating Proscar therapy. September 2020 PSA is stable at 2.22. He has refused and continues to refuse MRI of the pelvis along with WALTER examination. He has been very apprehensive about further prostate biopsies as he states he had moderate pain and bleeding after his procedure in 2013. He states that he will consider MRI of the pelvis with and without contrast only if his PSA dramatically rises.  Will recheck PSA in six months and call with results. Next appointment in one year.      3. Microscopic hematuria/Abnormal urine cytology- Per urine dip. Urine cytology last August 2018 was positive for atypical cells. He refused Bladder Cx testing and office cystoscopy for further delineation. Urine cytology 2019 was negative. Obtained urine cytology and micro today.

## 2020-09-14 ENCOUNTER — OFFICE VISIT (OUTPATIENT)
Dept: UROLOGY | Age: 65
End: 2020-09-14
Payer: COMMERCIAL

## 2020-09-14 VITALS — TEMPERATURE: 97 F | WEIGHT: 194 LBS | BODY MASS INDEX: 30.45 KG/M2 | HEIGHT: 67 IN

## 2020-09-14 LAB
BILIRUBIN URINE: NEGATIVE
BILIRUBIN URINE: NEGATIVE
BLOOD URINE, POC: NORMAL
BLOOD, URINE: NEGATIVE
CHARACTER, URINE: CLEAR
CHARACTER, URINE: CLEAR
COLOR, URINE: YELLOW
COLOR: YELLOW
GLUCOSE URINE: NEGATIVE MG/DL
GLUCOSE URINE: NEGATIVE MG/DL
KETONES, URINE: NEGATIVE
KETONES, URINE: NEGATIVE
LEUKOCYTE CLUMPS, URINE: NEGATIVE
LEUKOCYTE ESTERASE, URINE: NEGATIVE
NITRITE, URINE: NEGATIVE
NITRITE, URINE: NEGATIVE
PH UA: 5 (ref 5–9)
PH, URINE: 5 (ref 5–9)
POST VOID RESIDUAL (PVR): 30 ML
PROTEIN UA: NEGATIVE
PROTEIN, URINE: NEGATIVE MG/DL
SPECIFIC GRAVITY, URINE: 1.02 (ref 1–1.03)
SPECIFIC GRAVITY, URINE: 1.02 (ref 1–1.03)
UROBILINOGEN, URINE: 0.2 EU/DL (ref 0–1)
UROBILINOGEN, URINE: 0.2 EU/DL (ref 0–1)

## 2020-09-14 PROCEDURE — 81003 URINALYSIS AUTO W/O SCOPE: CPT | Performed by: PHYSICIAN ASSISTANT

## 2020-09-14 PROCEDURE — 51798 US URINE CAPACITY MEASURE: CPT | Performed by: PHYSICIAN ASSISTANT

## 2020-09-14 PROCEDURE — 99213 OFFICE O/P EST LOW 20 MIN: CPT | Performed by: PHYSICIAN ASSISTANT

## 2020-09-25 RX ORDER — FINASTERIDE 5 MG/1
TABLET, FILM COATED ORAL
Qty: 90 TABLET | Refills: 0 | Status: SHIPPED | OUTPATIENT
Start: 2020-09-25 | End: 2021-01-12

## 2020-09-25 NOTE — TELEPHONE ENCOUNTER
Raya Jack called requesting a refill on the following medications:  Requested Prescriptions     Pending Prescriptions Disp Refills    finasteride (PROSCAR) 5 MG tablet [Pharmacy Med Name: FINASTERIDE 5MG TABLETS] 90 tablet 0     Sig: TAKE 1 [address] verified:  .[email or web address]      Date of last visit: 09/14/2020  Date of next visit (if applicable): 75/27/6642

## 2020-11-13 RX ORDER — IRBESARTAN 300 MG/1
300 TABLET ORAL DAILY
Qty: 90 TABLET | Refills: 2 | Status: SHIPPED | OUTPATIENT
Start: 2020-11-13 | End: 2021-04-29 | Stop reason: SDUPTHER

## 2020-12-23 RX ORDER — CARVEDILOL PHOSPHATE 10 MG/1
CAPSULE, EXTENDED RELEASE ORAL
Qty: 90 CAPSULE | Refills: 1 | Status: SHIPPED | OUTPATIENT
Start: 2020-12-23 | End: 2021-04-29 | Stop reason: SDUPTHER

## 2020-12-30 NOTE — TELEPHONE ENCOUNTER
Adelbert Nissen called requesting a refill on the following medications:  Requested Prescriptions     Pending Prescriptions Disp Refills    silodosin (RAPAFLO) 4 MG CAPS capsule [Pharmacy Med Name: SILODOSIN 4MG CAPSULES] 30 capsule 11     Sig: TAKE 1 [address] [facility] verified:  .[email or web address]      Date of last visit: 09/14/2020  Date of next visit (if applicable): 53/39/8940

## 2020-12-31 RX ORDER — SILODOSIN 4 MG/1
4 CAPSULE ORAL EVERY EVENING
Qty: 30 CAPSULE | Refills: 11 | Status: SHIPPED | OUTPATIENT
Start: 2020-12-31 | End: 2022-01-17 | Stop reason: SDUPTHER

## 2021-01-11 DIAGNOSIS — N40.0 BENIGN NON-NODULAR PROSTATIC HYPERPLASIA WITHOUT LOWER URINARY TRACT SYMPTOMS: ICD-10-CM

## 2021-01-11 NOTE — TELEPHONE ENCOUNTER
Javy Rush called requesting a refill on the following medications:  Requested Prescriptions     Pending Prescriptions Disp Refills    finasteride (PROSCAR) 5 MG tablet [Pharmacy Med Name: FINASTERIDE 5MG TABLETS] 90 tablet 0     Sig: TAKE 1 [address] verified:  .[email or web address]      Date of last visit: 09/14/2020  Date of next visit (if applicable): 61/01/6303

## 2021-01-12 RX ORDER — FINASTERIDE 5 MG/1
TABLET, FILM COATED ORAL
Qty: 90 TABLET | Refills: 0 | Status: SHIPPED | OUTPATIENT
Start: 2021-01-12 | End: 2021-03-30

## 2021-02-13 ENCOUNTER — NURSE ONLY (OUTPATIENT)
Dept: LAB | Age: 66
End: 2021-02-13

## 2021-02-13 DIAGNOSIS — R97.20 ELEVATED PSA: ICD-10-CM

## 2021-02-13 LAB — PROSTATE SPECIFIC ANTIGEN: 2.2 NG/ML (ref 0–1)

## 2021-02-15 ENCOUNTER — TELEPHONE (OUTPATIENT)
Dept: UROLOGY | Age: 66
End: 2021-02-15

## 2021-02-15 NOTE — TELEPHONE ENCOUNTER
Patient returned the call and advised PSA 2.20. He voiced understanding and will repeat PSA before the appointment. Order sent via mail.

## 2021-02-15 NOTE — TELEPHONE ENCOUNTER
Please let Mr. Estefany Browning know that his PSA is stable at 2.20. Recommend PSA before next visit.

## 2021-03-29 DIAGNOSIS — N40.0 BENIGN NON-NODULAR PROSTATIC HYPERPLASIA WITHOUT LOWER URINARY TRACT SYMPTOMS: ICD-10-CM

## 2021-03-29 NOTE — TELEPHONE ENCOUNTER
Mirna Gallegos called requesting a refill on the following medications:  Requested Prescriptions     Pending Prescriptions Disp Refills    finasteride (PROSCAR) 5 MG tablet [Pharmacy Med Name: FINASTERIDE 5MG TABLETS] 90 tablet 0     Sig: TAKE 1 [address] verified:  .[email or web address]      Date of last visit: 09/14/2020  Date of next visit (if applicable): 5/09/1741

## 2021-03-30 RX ORDER — FINASTERIDE 5 MG/1
TABLET, FILM COATED ORAL
Qty: 90 TABLET | Refills: 0 | Status: SHIPPED | OUTPATIENT
Start: 2021-03-30 | End: 2021-06-24

## 2021-04-29 ENCOUNTER — OFFICE VISIT (OUTPATIENT)
Dept: CARDIOLOGY CLINIC | Age: 66
End: 2021-04-29
Payer: COMMERCIAL

## 2021-04-29 VITALS
HEART RATE: 83 BPM | BODY MASS INDEX: 31.61 KG/M2 | WEIGHT: 201.4 LBS | SYSTOLIC BLOOD PRESSURE: 130 MMHG | DIASTOLIC BLOOD PRESSURE: 76 MMHG | HEIGHT: 67 IN

## 2021-04-29 DIAGNOSIS — I10 ESSENTIAL HYPERTENSION: ICD-10-CM

## 2021-04-29 DIAGNOSIS — E78.01 FAMILIAL HYPERCHOLESTEROLEMIA: ICD-10-CM

## 2021-04-29 DIAGNOSIS — I25.10 CORONARY ARTERY DISEASE INVOLVING NATIVE CORONARY ARTERY OF NATIVE HEART WITHOUT ANGINA PECTORIS: Primary | ICD-10-CM

## 2021-04-29 PROCEDURE — 99213 OFFICE O/P EST LOW 20 MIN: CPT | Performed by: NUCLEAR MEDICINE

## 2021-04-29 PROCEDURE — 93000 ELECTROCARDIOGRAM COMPLETE: CPT | Performed by: NUCLEAR MEDICINE

## 2021-04-29 RX ORDER — CARVEDILOL PHOSPHATE 10 MG/1
CAPSULE, EXTENDED RELEASE ORAL
Qty: 90 CAPSULE | Refills: 3 | Status: SHIPPED | OUTPATIENT
Start: 2021-04-29 | End: 2022-04-25

## 2021-04-29 RX ORDER — IRBESARTAN 300 MG/1
300 TABLET ORAL DAILY
Qty: 90 TABLET | Refills: 3 | Status: SHIPPED | OUTPATIENT
Start: 2021-04-29 | End: 2022-04-18 | Stop reason: SDUPTHER

## 2021-04-29 NOTE — PROGRESS NOTES
[address] [place] [address].  [address] [place] [id number]  Dept: [phone]  Dept Fax: [phone]  Loc: [phone]    Visit Date: 2021    Tirso Lott is a 77 y.o. male who presents todayfor:  Chief Complaint   Patient presents with    Check-Up     EKG done today    Coronary Artery Disease    Hypertension    Hyperlipidemia     Know LAD stent  No chest pain   No changes in breathing  Issues with statins   No new symptoms  Bp is stable  No dizziness  No syncope  Cath last year was okay   HPI:  HPI  Past Medical History:   Diagnosis Date    Arthritis     CAD (coronary artery disease)     Hyperlipidemia     Hypertension     MI, old       Past Surgical History:   Procedure Laterality Date    COLONOSCOPY      CORONARY ANGIOPLASTY WITH STENT PLACEMENT      DIAGNOSTIC CARDIAC CATH LAB PROCEDURE  2020    JOINT REPLACEMENT Left 2020    partial knee replacement     Family History   Problem Relation Age of Onset    High Blood Pressure Mother     Heart Disease Mother     Heart Disease Father     Heart Attack Father     No Known Problems Sister     No Known Problems Brother     No Known Problems Brother      Social History     Tobacco Use    Smoking status: Former Smoker     Packs/day: 0.25     Years: 2.00     Pack years: 0.50     Types: Cigarettes     Start date: 1973     Quit date: 1977     Years since quittin.3    Smokeless tobacco: Never Used   Substance Use Topics    Alcohol use: No     Alcohol/week: 0.0 standard drinks      Current Outpatient Medications   Medication Sig Dispense Refill    finasteride (PROSCAR) 5 MG tablet TAKE 1 TABLET BY MOUTH EVERY DAY 90 tablet 0    silodosin (RAPAFLO) 4 MG CAPS capsule TAKE 1 CAPSULE BY MOUTH EVERY EVENING 30 capsule 11    carvedilol (COREG CR) 10 MG CP24 extended release capsule TAKE 1 CAPSULE BY MOUTH DAILY WITH BREAKFAST 90 capsule 1    irbesartan (AVAPRO) 300 MG tablet Take 1 tablet by mouth daily 90 tablet 2    Cyanocobalamin (B-12 PO) Take 1,500 mg by mouth       Multiple Vitamins-Minerals (THERAPEUTIC MULTIVITAMIN-MINERALS) tablet Take 1 tablet by mouth daily      aspirin 81 MG chewable tablet Take 81 mg by mouth daily. No current facility-administered medications for this visit. Allergies   Allergen Reactions    Azithromycin      hives    Pcn [Penicillins]      hives     Health Maintenance   Topic Date Due    AAA screen  Never done    Hepatitis C screen  Never done    COVID-19 Vaccine (1) Never done    Shingles Vaccine (1 of 2) Never done    Colon cancer screen colonoscopy  Never done    A1C test (Diabetic or Prediabetic)  07/02/2014    Pneumococcal 65+ years Vaccine (1 of 1 - PPSV23) Never done    Potassium monitoring  02/27/2021    Creatinine monitoring  02/27/2021    Flu vaccine (Season Ended) 09/01/2021    DTaP/Tdap/Td vaccine (2 - Td) 03/17/2025    Lipid screen  04/28/2025    Hepatitis A vaccine  Aged Out    Hepatitis B vaccine  Aged Out    Hib vaccine  Aged Out    Meningococcal (ACWY) vaccine  Aged Out       Subjective:  Review of Systems  General:   No fever, no chills, No fatigue or weight loss  Pulmonary:    No dyspnea, no wheezing  Cardiac:    Denies recent chest pain,   GI:     No nausea or vomiting, no abdominal pain  Neuro:    No dizziness or light headedness,   Musculoskeletal:  No recent active issues  Extremities:   No edema, no obvious claudication       Objective:  Physical Exam  /76   Pulse 83   Ht 5' 7\" (1.702 m)   Wt 201 lb 6.4 oz (91.4 kg)   BMI 31.54 kg/m²   General:   Well developed, well nourished  Lungs:   Clear to auscultation  Heart:    Normal S1 S2, Slight murmur. no rubs, no gallops  Abdomen:   Soft, non tender, no organomegalies, positive bowel sounds  Extremities:   No edema, no cyanosis, good peripheral pulses  Neurological:   Awake, alert, oriented.  No obvious focal deficits Musculoskelatal:  No obvious deformities    Assessment:      Diagnosis Orders   1. Coronary artery disease involving native coronary artery of native heart without angina pectoris  EKG 12 Lead   2. Essential hypertension     3. Familial hypercholesterolemia     as above  Cardiac seems fair for now   ECG in office was done today. I reviewed the ECG. No acute findings    Plan:  No follow-ups on file. As above  Continue risk factor modification and medical management. Thank you for allowing me to participate in the care of your patient. Please don't hesitate to contact me regarding any further issues related to the patient care      Orders Placed:  Orders Placed This Encounter   Procedures    EKG 12 Lead     Order Specific Question:   Reason for Exam?     Answer: Other       Medications Prescribed:  No orders of the defined types were placed in this encounter. Discussed use, benefit, and side effects of prescribed medications. All patient questions answered. Pt voicedunderstanding. Instructed to continue current medications, diet and exercise. Continue risk factor modification and medical management. Patient agreed with treatment plan. Follow up as directed.     Electronically signedby Lamonte Magana MD on 4/29/2021 at 8:17 AM

## 2021-06-24 DIAGNOSIS — N40.0 BENIGN NON-NODULAR PROSTATIC HYPERPLASIA WITHOUT LOWER URINARY TRACT SYMPTOMS: ICD-10-CM

## 2021-06-24 RX ORDER — FINASTERIDE 5 MG/1
TABLET, FILM COATED ORAL
Qty: 90 TABLET | Refills: 0 | Status: SHIPPED | OUTPATIENT
Start: 2021-06-24 | End: 2021-09-24

## 2021-06-24 NOTE — TELEPHONE ENCOUNTER
Jerilyn Mercado called requesting a refill on the following medications:  Requested Prescriptions     Pending Prescriptions Disp Refills    finasteride (PROSCAR) 5 MG tablet [Pharmacy Med Name: FINASTERIDE 5MG TABLETS] 90 tablet 0     Sig: TAKE 1 TABLET BY MOUTH [id number] verified:  .[email or web address]      Date of last visit: 09/13/20 with Jenny Hernandez  Date of next visit (if applicable): 6/39/1044 with Jenny Hernandez

## 2021-09-24 DIAGNOSIS — N40.0 BENIGN NON-NODULAR PROSTATIC HYPERPLASIA WITHOUT LOWER URINARY TRACT SYMPTOMS: ICD-10-CM

## 2021-09-24 RX ORDER — FINASTERIDE 5 MG/1
TABLET, FILM COATED ORAL
Qty: 90 TABLET | Refills: 0 | Status: SHIPPED | OUTPATIENT
Start: 2021-09-24 | End: 2021-12-22

## 2021-09-24 NOTE — TELEPHONE ENCOUNTER
Jm Antunez called requesting a refill on the following medications:  Requested Prescriptions     Pending Prescriptions Disp Refills    finasteride (PROSCAR) 5 MG tablet [Pharmacy Med Name: FINASTERIDE 5MG TABLETS] 90 tablet 0     Sig: TAKE 1 [address] verified:  .[email or web address]      Date of last visit: 09/14/2020  Date of next visit (if applicable): 34/3/1748

## 2021-10-25 ENCOUNTER — OFFICE VISIT (OUTPATIENT)
Dept: FAMILY MEDICINE CLINIC | Age: 66
End: 2021-10-25
Payer: COMMERCIAL

## 2021-10-25 VITALS
HEART RATE: 57 BPM | SYSTOLIC BLOOD PRESSURE: 126 MMHG | BODY MASS INDEX: 30.18 KG/M2 | WEIGHT: 192.7 LBS | RESPIRATION RATE: 16 BRPM | DIASTOLIC BLOOD PRESSURE: 62 MMHG

## 2021-10-25 DIAGNOSIS — Z13.1 SCREENING FOR DIABETES MELLITUS: ICD-10-CM

## 2021-10-25 DIAGNOSIS — M18.12 OSTEOARTHRITIS OF CARPOMETACARPAL (CMC) JOINT OF LEFT THUMB, UNSPECIFIED OSTEOARTHRITIS TYPE: Primary | ICD-10-CM

## 2021-10-25 DIAGNOSIS — Z13.220 SCREENING FOR HYPERLIPIDEMIA: ICD-10-CM

## 2021-10-25 PROCEDURE — 99213 OFFICE O/P EST LOW 20 MIN: CPT | Performed by: NURSE PRACTITIONER

## 2021-10-25 SDOH — ECONOMIC STABILITY: FOOD INSECURITY: WITHIN THE PAST 12 MONTHS, THE FOOD YOU BOUGHT JUST DIDN'T LAST AND YOU DIDN'T HAVE MONEY TO GET MORE.: NEVER TRUE

## 2021-10-25 SDOH — ECONOMIC STABILITY: FOOD INSECURITY: WITHIN THE PAST 12 MONTHS, YOU WORRIED THAT YOUR FOOD WOULD RUN OUT BEFORE YOU GOT MONEY TO BUY MORE.: NEVER TRUE

## 2021-10-25 ASSESSMENT — PATIENT HEALTH QUESTIONNAIRE - PHQ9
2. FEELING DOWN, DEPRESSED OR HOPELESS: 0
SUM OF ALL RESPONSES TO PHQ QUESTIONS 1-9: 0
SUM OF ALL RESPONSES TO PHQ QUESTIONS 1-9: 0
SUM OF ALL RESPONSES TO PHQ9 QUESTIONS 1 & 2: 0
1. LITTLE INTEREST OR PLEASURE IN DOING THINGS: 0
SUM OF ALL RESPONSES TO PHQ QUESTIONS 1-9: 0

## 2021-10-25 ASSESSMENT — SOCIAL DETERMINANTS OF HEALTH (SDOH): HOW HARD IS IT FOR YOU TO PAY FOR THE VERY BASICS LIKE FOOD, HOUSING, MEDICAL CARE, AND HEATING?: NOT HARD AT ALL

## 2021-10-25 NOTE — PROGRESS NOTES
Prateek Thomas (1955) 74 y.o. male here for evaluation of the following chief complaint(s):      HPI:  Chief Complaint   Patient presents with    Wrist Pain     left hand with pain radiating into left thumb, pain is contance in nature, unable to  objects- DENIES thumb trigger-present for 1 year, worse over past 3 weeks     Immunizations     declines flu vaccing today        Onset of 1 year with discomfort in left thumb. Worsening the last 3 weeks. Came on after splitting wood. Pain with  and turning. Pushing on wrist will cause shooting pain up thumb. Vitals:    10/25/21 0813   BP: 126/62   Pulse: 57   Resp: 16       Patient Active Problem List   Diagnosis    CAD (coronary artery disease)    MI, [facility] BPH with obstruction/lower urinary tract symptoms    Abnormal stress test       SUBJECTIVE/OBJECTIVE:  Review of Systems   Constitutional: Negative. Musculoskeletal: Positive for arthralgias. Physical Exam  Constitutional:       General: He is not in acute distress. Appearance: He is not ill-appearing. Musculoskeletal:        Hands:    Neurological:      Mental Status: He is alert. ASSESSMENT/PLAN:   Diagnosis Orders   1. Osteoarthritis of carpometacarpal (CMC) joint of left thumb, unspecified osteoarthritis type     2. Screening for hyperlipidemia  Lipid Panel   3. Screening for diabetes mellitus  CBC    Hemoglobin A1C    Comprehensive Metabolic Panel         MDM: Volatern Gel OTC - samples given   Screening Labs   RTO PRN    An electronic signature was used to authenticate this note.     --NEIL Henderson - CNP

## 2021-10-27 ENCOUNTER — NURSE ONLY (OUTPATIENT)
Dept: LAB | Age: 66
End: 2021-10-27

## 2021-10-27 DIAGNOSIS — Z13.220 SCREENING FOR HYPERLIPIDEMIA: ICD-10-CM

## 2021-10-27 DIAGNOSIS — R97.20 ELEVATED PSA: ICD-10-CM

## 2021-10-27 DIAGNOSIS — Z13.1 SCREENING FOR DIABETES MELLITUS: ICD-10-CM

## 2021-10-27 LAB
ALBUMIN SERPL-MCNC: 4.4 G/DL (ref 3.5–5.1)
ALP BLD-CCNC: 60 U/L (ref 38–126)
ALT SERPL-CCNC: 25 U/L (ref 11–66)
ANION GAP SERPL CALCULATED.3IONS-SCNC: 10 MEQ/L (ref 8–16)
AST SERPL-CCNC: 17 U/L (ref 5–40)
AVERAGE GLUCOSE: 141 MG/DL (ref 70–126)
BILIRUB SERPL-MCNC: 0.5 MG/DL (ref 0.3–1.2)
BUN BLDV-MCNC: 22 MG/DL (ref 7–22)
CALCIUM SERPL-MCNC: 9.8 MG/DL (ref 8.5–10.5)
CHLORIDE BLD-SCNC: 103 MEQ/L (ref 98–111)
CHOLESTEROL, TOTAL: 213 MG/DL (ref 100–199)
CO2: 25 MEQ/L (ref 23–33)
CREAT SERPL-MCNC: 1.1 MG/DL (ref 0.4–1.2)
ERYTHROCYTE [DISTWIDTH] IN BLOOD BY AUTOMATED COUNT: 12.4 % (ref 11.5–14.5)
ERYTHROCYTE [DISTWIDTH] IN BLOOD BY AUTOMATED COUNT: 43.1 FL (ref 35–45)
GFR SERPL CREATININE-BSD FRML MDRD: 67 ML/MIN/1.73M2
GLUCOSE BLD-MCNC: 126 MG/DL (ref 70–108)
HBA1C MFR BLD: 6.7 % (ref 4.4–6.4)
HCT VFR BLD CALC: 45.3 % (ref 42–52)
HDLC SERPL-MCNC: 56 MG/DL
HEMOGLOBIN: 14.6 GM/DL (ref 14–18)
LDL CHOLESTEROL CALCULATED: 130 MG/DL
MCH RBC QN AUTO: 30.7 PG (ref 26–33)
MCHC RBC AUTO-ENTMCNC: 32.2 GM/DL (ref 32.2–35.5)
MCV RBC AUTO: 95.2 FL (ref 80–94)
PLATELET # BLD: 245 THOU/MM3 (ref 130–400)
PMV BLD AUTO: 10.8 FL (ref 9.4–12.4)
POTASSIUM SERPL-SCNC: 4.4 MEQ/L (ref 3.5–5.2)
PROSTATE SPECIFIC ANTIGEN: 4.2 NG/ML (ref 0–1)
RBC # BLD: 4.76 MILL/MM3 (ref 4.7–6.1)
SODIUM BLD-SCNC: 138 MEQ/L (ref 135–145)
TOTAL PROTEIN: 7 G/DL (ref 6.1–8)
TRIGL SERPL-MCNC: 136 MG/DL (ref 0–199)
WBC # BLD: 5.6 THOU/MM3 (ref 4.8–10.8)

## 2021-11-01 ENCOUNTER — OFFICE VISIT (OUTPATIENT)
Dept: FAMILY MEDICINE CLINIC | Age: 66
End: 2021-11-01
Payer: COMMERCIAL

## 2021-11-01 VITALS
RESPIRATION RATE: 16 BRPM | HEART RATE: 64 BPM | BODY MASS INDEX: 30.28 KG/M2 | HEIGHT: 67 IN | SYSTOLIC BLOOD PRESSURE: 124 MMHG | WEIGHT: 192.9 LBS | DIASTOLIC BLOOD PRESSURE: 60 MMHG

## 2021-11-01 DIAGNOSIS — Z23 NEED FOR 23-POLYVALENT PNEUMOCOCCAL POLYSACCHARIDE VACCINE: ICD-10-CM

## 2021-11-01 DIAGNOSIS — E78.00 PURE HYPERCHOLESTEROLEMIA: ICD-10-CM

## 2021-11-01 DIAGNOSIS — N13.8 BPH WITH OBSTRUCTION/LOWER URINARY TRACT SYMPTOMS: ICD-10-CM

## 2021-11-01 DIAGNOSIS — I25.10 CORONARY ARTERY DISEASE INVOLVING NATIVE CORONARY ARTERY OF NATIVE HEART WITHOUT ANGINA PECTORIS: ICD-10-CM

## 2021-11-01 DIAGNOSIS — N40.1 BPH WITH OBSTRUCTION/LOWER URINARY TRACT SYMPTOMS: ICD-10-CM

## 2021-11-01 DIAGNOSIS — E11.9 NEW ONSET TYPE 2 DIABETES MELLITUS (HCC): Primary | ICD-10-CM

## 2021-11-01 PROCEDURE — 90471 IMMUNIZATION ADMIN: CPT | Performed by: FAMILY MEDICINE

## 2021-11-01 PROCEDURE — 99214 OFFICE O/P EST MOD 30 MIN: CPT | Performed by: FAMILY MEDICINE

## 2021-11-01 PROCEDURE — 90732 PPSV23 VACC 2 YRS+ SUBQ/IM: CPT | Performed by: FAMILY MEDICINE

## 2021-11-01 RX ORDER — ROSUVASTATIN CALCIUM 5 MG/1
5 TABLET, COATED ORAL NIGHTLY
Qty: 90 TABLET | Refills: 3 | Status: SHIPPED | OUTPATIENT
Start: 2021-11-01 | End: 2022-10-10

## 2021-11-01 ASSESSMENT — PATIENT HEALTH QUESTIONNAIRE - PHQ9
2. FEELING DOWN, DEPRESSED OR HOPELESS: 0
SUM OF ALL RESPONSES TO PHQ QUESTIONS 1-9: 0
SUM OF ALL RESPONSES TO PHQ9 QUESTIONS 1 & 2: 0
1. LITTLE INTEREST OR PLEASURE IN DOING THINGS: 0

## 2021-11-01 ASSESSMENT — ENCOUNTER SYMPTOMS
GASTROINTESTINAL NEGATIVE: 1
RESPIRATORY NEGATIVE: 1

## 2021-11-01 NOTE — PROGRESS NOTES
2021    Keyla Hopkins (:  1955) is a 74 y.o. male, here for a preventive medicine evaluation. Chief Complaint   Patient presents with    Annual Exam    Results     Annual eval.  Doing well overall. New onset DM. Lab Results   Component Value Date    LABA1C 6.7 (H) 10/27/2021    LABA1C 6.0 2013     Lab Results   Component Value Date    LDLCALC 130 10/27/2021    CREATININE 1.1 10/27/2021     Hx of BPH, PSA mildly elevated. Has follow up scheduled with Urology. Lab Results   Component Value Date    PSA 4.20 (H) 10/27/2021    PSA 2.20 (H) 2021    PSA 2.21 (H) 2019     BPs and weight stable. BP Readings from Last 3 Encounters:   21 124/60   10/25/21 126/62   21 130/76     Wt Readings from Last 3 Encounters:   21 192 lb 14.4 oz (87.5 kg)   10/25/21 192 lb 11.2 oz (87.4 kg)   21 201 lb 6.4 oz (91.4 kg)     Hx of CAD, no longer on statin. Was on Lipitor but dc'd on his own due to memory issues. Patient Active Problem List   Diagnosis    CAD (coronary artery disease)    MI, old   Floridalma Avila BPH with obstruction/lower urinary tract symptoms    Abnormal stress test       Review of Systems   Constitutional: Negative. HENT: Negative. Respiratory: Negative. Cardiovascular: Negative. Gastrointestinal: Negative. Musculoskeletal: Negative. All other systems reviewed and are negative. Prior to Visit Medications    Medication Sig Taking?  Authorizing Provider   rosuvastatin (CRESTOR) 5 MG tablet Take 1 tablet by mouth nightly Yes Jonathan Rizo DO   finasteride (PROSCAR) 5 MG tablet TAKE 1 TABLET BY MOUTH EVERY DAY Yes Keyanna Lindquist PA-C   carvedilol (COREG CR) 10 MG CP24 extended release capsule TAKE 1 CAPSULE BY MOUTH DAILY WITH BREAKFAST Yes Massiel Arriaga MD   irbesartan (AVAPRO) 300 MG tablet Take 1 tablet by mouth daily Yes Massiel Arriaga MD   silodosin (RAPAFLO) 4 MG CAPS capsule TAKE 1 CAPSULE BY MOUTH EVERY EVENING Yes Keyanna Lindquist PA-C   Cyanocobalamin (B-12 PO) Take 3,000 mg by mouth  Yes Historical Provider, MD   Multiple Vitamins-Minerals (THERAPEUTIC MULTIVITAMIN-MINERALS) tablet Take 1 tablet by mouth daily Yes Historical Provider, MD   aspirin 81 MG chewable tablet Take 81 mg by mouth daily. Yes Historical Provider, MD        Allergies   Allergen Reactions    Azithromycin      hives    Pcn [Penicillins]      hives       Past Medical History:   Diagnosis Date    Arthritis     CAD (coronary artery disease)     Hyperlipidemia     Hypertension     MI, old        Past Surgical History:   Procedure Laterality Date    COLONOSCOPY      CORONARY ANGIOPLASTY WITH STENT PLACEMENT      DIAGNOSTIC CARDIAC CATH LAB PROCEDURE  2020    JOINT REPLACEMENT Left 2020    partial knee replacement       Social History     Socioeconomic History    Marital status:      Spouse name: Not on file    Number of children: Not on file    Years of education: Not on file    Highest education level: Not on file   Occupational History    Not on file   Tobacco Use    Smoking status: Former Smoker     Packs/day: 0.25     Years: 2.00     Pack years: 0.50     Types: Cigarettes     Start date: 1973     Quit date: 1977     Years since quittin.8    Smokeless tobacco: Never Used   Vaping Use    Vaping Use: Never used   Substance and Sexual Activity    Alcohol use: No     Alcohol/week: 0.0 standard drinks    Drug use: No    Sexual activity: Not on file   Other Topics Concern    Not on file   Social History Narrative    Not on file     Social Determinants of Health     Financial Resource Strain: Low Risk     Difficulty of Paying Living Expenses: Not hard at all   Food Insecurity: No Food Insecurity    Worried About Running Out of Food in the Last Year: Never true    Leela of Food in the Last Year: Never true   Transportation Needs:     Lack of Transportation (Medical):      Lack of Transportation (Non-Medical):    Physical Activity:     Days of Exercise per Week:     Minutes of Exercise per Session:    Stress:     Feeling of Stress :    Social Connections:     Frequency of Communication with Friends and Family:     Frequency of Social Gatherings with Friends and Family:     Attends Yazidism Services:     Active Member of Clubs or Organizations:     Attends Club or Organization Meetings:     Marital Status:    Intimate Partner Violence:     Fear of Current or Ex-Partner:     Emotionally Abused:     Physically Abused:     Sexually Abused:         Family History   Problem Relation Age of Onset    High Blood Pressure Mother     Heart Disease Mother     Heart Disease Father     Heart Attack Father     No Known Problems Sister     No Known Problems Brother     No Known Problems Brother        ADVANCE DIRECTIVE: N, <no information>    Vitals:    11/01/21 1114   BP: 124/60   Site: Left Upper Arm   Position: Sitting   Cuff Size: Large Adult   Pulse: 64   Resp: 16   Weight: 192 lb 14.4 oz (87.5 kg)   Height: 5' 7\" (1.702 m)     Estimated body mass index is 30.21 kg/m² as calculated from the following:    Height as of this encounter: 5' 7\" (1.702 m). Weight as of this encounter: 192 lb 14.4 oz (87.5 kg). Physical Exam  Vitals and nursing note reviewed. Constitutional:       General: He is not in acute distress. Appearance: Normal appearance. He is well-developed. HENT:      Head: Normocephalic and atraumatic. Right Ear: Tympanic membrane normal.      Left Ear: Tympanic membrane normal.   Eyes:      Conjunctiva/sclera: Conjunctivae normal.   Cardiovascular:      Rate and Rhythm: Normal rate and regular rhythm. Heart sounds: Normal heart sounds. No murmur heard. Pulmonary:      Effort: Pulmonary effort is normal.      Breath sounds: Normal breath sounds. No wheezing, rhonchi or rales. Abdominal:      General: There is no distension.    Musculoskeletal: Cervical back: Neck supple. Skin:     General: Skin is warm and dry. Findings: No rash (on exposed surfaces). Neurological:      General: No focal deficit present. Mental Status: He is alert. Psychiatric:         Attention and Perception: Attention normal.         Mood and Affect: Mood normal.         Speech: Speech normal.         Behavior: Behavior normal. Behavior is cooperative. Thought Content: Thought content normal.         Judgment: Judgment normal.         No flowsheet data found. Lab Results   Component Value Date    CHOL 213 10/27/2021    CHOL 196 04/28/2020    CHOL 201 06/30/2018    CHOL 146 07/13/2016    TRIG 136 10/27/2021    TRIG 135 04/28/2020    TRIG 97 06/30/2018    HDL 56 10/27/2021    HDL 51 04/28/2020    HDL 56 06/30/2018    LDLCALC 130 10/27/2021    LDLCALC 118 04/28/2020    LDLCALC 126 06/30/2018    GLUCOSE 126 10/27/2021    LABA1C 6.7 10/27/2021    LABA1C 6.0 07/02/2013       The ASCVD Risk score (Stuart Bejarano et al., 2013) failed to calculate for the following reasons:     The patient has a prior MI or stroke diagnosis    Immunization History   Administered Date(s) Administered    Pneumococcal Polysaccharide (Cdelinkvv25) 11/01/2021    Tdap (Boostrix, Adacel) 03/17/2015       Health Maintenance   Topic Date Due    AAA screen  Never done    Hepatitis C screen  Never done    COVID-19 Vaccine (1) Never done    Colon cancer screen colonoscopy  Never done    Shingles Vaccine (1 of 2) Never done    Flu vaccine (1) Never done    A1C test (Diabetic or Prediabetic)  01/27/2022    Lipid screen  10/27/2022    Potassium monitoring  10/27/2022    Creatinine monitoring  10/27/2022    PSA counseling  10/27/2022    DTaP/Tdap/Td vaccine (2 - Td or Tdap) 03/17/2025    Pneumococcal 65+ years Vaccine  Completed    Hepatitis A vaccine  Aged Out    Hepatitis B vaccine  Aged Out    Hib vaccine  Aged Out    Meningococcal (ACWY) vaccine  Aged Out ASSESSMENT/PLAN:  1. New onset type 2 diabetes mellitus ([facility] [address]  -     Hemoglobin A1C; Future  -     Comprehensive Metabolic Panel; Future  2. Pure hypercholesterolemia  -     rosuvastatin (CRESTOR) 5 MG tablet; Take 1 tablet by mouth nightly, Disp-90 tablet, R-3Normal  -     Lipid Panel w/ Reflex Direct LDL; Future  -     Comprehensive Metabolic Panel; Future  3. Coronary artery disease involving native coronary artery of native heart without angina pectoris  4. BPH with obstruction/lower urinary tract symptoms  5. Need for 23-polyvalent pneumococcal polysaccharide vaccine  -     Pneumococcal polysaccharide vaccine 23-valent greater than or equal to 1yo subcutaneous/IM    -  Chronic medical problems stable  -  Labs reviewed, new onset DM  -  Continue current medications, declines medication to help lower sugar level  -  Dietary changes discussed  -  Agrees to try Crestor, did not tolerate Lipitor in the past  -  Follow up with specialists as scheduled  -  Pneumovax-23 today  -  Vascular screening h/o given  -  Declines CRS at this time  -  Recheck labs 3 mos      Return in about 3 months (around 2/1/2022) for DM2. An electronic signature was used to authenticate this note.     --Trudee Cabot, DO on 11/1/2021 at 12:52 PM

## 2021-11-01 NOTE — PROGRESS NOTES
Immunizations Administered     Name Date Dose Route    Pneumococcal Polysaccharide (Jupovewsn70) 11/1/2021 0.5 mL Intramuscular    Site: Deltoid- Left    Lot: [id number]    NDC: 5594-1539-43          Patient was given Pneumovax 23 0.5ml IM in left deltoid per v.o. Dr Ratna Baca. NDC# 9775-3463-06. Patient tolerated well and without incident. VIS given and reviewed. ABN signed.

## 2021-12-22 DIAGNOSIS — N40.0 BENIGN NON-NODULAR PROSTATIC HYPERPLASIA WITHOUT LOWER URINARY TRACT SYMPTOMS: ICD-10-CM

## 2021-12-22 RX ORDER — FINASTERIDE 5 MG/1
TABLET, FILM COATED ORAL
Qty: 90 TABLET | Refills: 0 | Status: SHIPPED | OUTPATIENT
Start: 2021-12-22 | End: 2022-01-17 | Stop reason: SDUPTHER

## 2021-12-22 NOTE — TELEPHONE ENCOUNTER
Anastasiya Mercer called requesting a refill on the following medications:  Requested Prescriptions     Pending Prescriptions Disp Refills    finasteride (PROSCAR) 5 MG tablet [Pharmacy Med Name: FINASTERIDE 5MG TABLETS] 90 tablet 0     Sig: TAKE 1 [address] verified:  .pv      Date of last visit: 09/14/2020  Date of next visit (if applicable) Voicemail left to return the call to the office to schedule and follow up.

## 2022-01-14 NOTE — PROGRESS NOTES
[address] [address] [place] [address] Ekaterina Dumont [address]  Dept: [phone]  Loc: [phone]      Mr. Jada Gilbert was seen in follow up for   Chief Complaint   Patient presents with    Follow-up     psa done 10/21    Benign Prostatic Hypertrophy    Elevated PSA        HPI:    Mr. Jada Gilbert is a 66-year-old male with a history of an elevated PSA. His PSA has ranged from 7.72-9.01 before starting Proscar therapy. A prostate biopsy was performed in September 2013, which was negative for malignancy. A PCA-3 level was obtained in October 2014 due to the persistence of his PSA and his value was 7 (negative). He was started on Proscar in October 2014 but was suboptimally controlled. He states that he tried Flomax in the past but discontinued due to orthostatic side effects and ineffectiveness of medication. He started Rapaflo in February 2016 and has noticed an improvement in his irritative and obstructive symptoms. In regards to his current urinary health, he reports minimal frequency, urgency, and weakened stream.  He is very pleased with his current urinary state. He denies fever/chills, flank pain, gross hematuria, dysuria, bowel irregularity. Of general medical concern, he denies dyspnea, chest pain, N/V, weight loss, night sweats, leg pain, or lightheadedness. He presents today for further evaluation.     Past Medical History:   Diagnosis Date    Arthritis     CAD (coronary artery disease)     Hyperlipidemia     Hypertension     MI, old        Past Surgical History:   Procedure Laterality Date    COLONOSCOPY      CORONARY ANGIOPLASTY WITH STENT PLACEMENT  2008    DIAGNOSTIC CARDIAC CATH LAB PROCEDURE  02/2020    JOINT REPLACEMENT Left 03/05/2020    partial knee replacement       Current Outpatient Medications on File Prior to Visit   Medication Sig Dispense Refill    rosuvastatin (CRESTOR) 5 MG tablet Take 1 tablet by mouth nightly 90 tablet 3    (Medical): Not on file    Lack of Transportation (Non-Medical): Not on file   Physical Activity:     Days of Exercise per Week: Not on file    Minutes of Exercise per Session: Not on file   Stress:     Feeling of Stress : Not on file   Social Connections:     Frequency of Communication with Friends and Family: Not on file    Frequency of Social Gatherings with Friends and Family: Not on file    Attends Restoration Services: Not on file    Active Member of [address] [place] [address] [employer] or Organizations: Not on file    Attends Club or Organization Meetings: Not on file    Marital Status: Not on file   Intimate Partner Violence:     Fear of Current or Ex-Partner: Not on file    Emotionally Abused: Not on file    Physically Abused: Not on file    Sexually Abused: Not on file   Housing Stability:     Unable to Pay for Housing in the Last Year: Not on file    Number of [place] in the Last Year: Not on file    Unstable Housing in the Last Year: Not on file       Review of Systems  Constitutional: Negative for fatigue, fever and unexpected weight change. HENT: Negative for congestion and trouble swallowing. Eyes: Negative for pain and itching. Respiratory: Negative for cough and shortness of breath. Cardiovascular: Negative for chest pain and leg swelling. Gastrointestinal: Negative for abdominal pain, constipation, diarrhea and nausea. Endocrine: Negative for cold intolerance and heat intolerance. Genitourinary: See HPI. Musculoskeletal: Negative for back pain and joint swelling. Skin: Negative for rash. Neurological: Negative for dizziness, weakness, numbness and headaches. Psychiatric/Behavioral: The patient is not nervous/anxious. Exam    /80   Ht 5' 7\" (1.702 m)   Wt 201 lb (91.2 kg)   BMI 31.48 kg/m²     Constitutional: He is oriented to person, place, and time. He appears well-developed and well-nourished.    HENT:    Head: Normocephalic and atraumatic. Cardiovascular: RRR. S1/S2. Pulmonary/Chest: Effort normal. CTA bilaterally. No r/r/w. Abdominal: Soft. Non-distended. Non-tender. Active bowel sounds. : Patient deferred examination today. Neurological: He is alert and oriented to person, place, and time. No focal deficits. Skin: Skin is warm and dry. Psychiatric: He has a normal mood and affect. His behavior is normal.      Labs    Results for POC orders placed in visit on 01/17/22   POCT Urinalysis No Micro (Auto)   Result Value Ref Range    Glucose, Ur Negative NEGATIVE mg/dl    Bilirubin Urine Negative     Ketones, Urine Negative NEGATIVE    Specific Gravity, Urine 1.020 1.002 - 1.030    Blood, UA POC Small (A) NEGATIVE    pH, Urine 5.00 5.0 - 9.0    Protein, Urine Negative NEGATIVE mg/dl    Urobilinogen, Urine 0.20 0.0 - 1.0 eu/dl    Nitrite, Urine Negative NEGATIVE    Leukocyte Clumps, Urine Negative NEGATIVE    Color, Urine Yellow [id number]    Character, Urine Clear [id number]   poct post void residual   Result Value Ref Range    post void residual 42 ml       Lab Results   Component Value Date    CREATININE 1.1 10/27/2021    BUN 22 10/27/2021     10/27/2021    K 4.4 10/27/2021     10/27/2021    CO2 25 10/27/2021       Lab Results   Component Value Date    PSA 4.20 (H) 10/27/2021    PSA 2.20 (H) 02/13/2021    PSA 2.21 (H) 08/20/2019         Assessment/Plan:    1. BPH with obstruction- Improved. Patient has taken Flomax in the past and experienced orthostatic side effects and the medication proved to be ineffective. Currently on Rapaflo 4 mg nightly along with Proscar 5 mg daily. Patient reports considerable clinical improvement with dual therapy with no side effects.  PVR today is 42 mL.       2. History of elevated PSA- PSA decreased from 7.72 to 2.80 after initiating Proscar therapy. October 2021 PSA has increased from 4.20 to 2.22. He understands the importance of further testing for this significant elevation.  He has been very apprehensive about further prostate biopsies as he states he had moderate pain and bleeding after his procedure in 2013. We will repeat his PSA prior to additional studies. If remains elevated, we will proceed with a MRI of the prostate. Consider Exosome Dx testing.       3. Microscopic hematuria/Abnormal urine cytology- Per urine dip. Urine cytology August 2018 was positive for atypical cells. He refused Bladder Cx testing and office cystoscopy for further delineation. Urine cytology 2019 was negative. Will obtain cytology at next visit.

## 2022-01-17 ENCOUNTER — TELEPHONE (OUTPATIENT)
Dept: UROLOGY | Age: 67
End: 2022-01-17

## 2022-01-17 ENCOUNTER — OFFICE VISIT (OUTPATIENT)
Dept: UROLOGY | Age: 67
End: 2022-01-17
Payer: COMMERCIAL

## 2022-01-17 VITALS
HEIGHT: 67 IN | DIASTOLIC BLOOD PRESSURE: 80 MMHG | SYSTOLIC BLOOD PRESSURE: 134 MMHG | BODY MASS INDEX: 31.55 KG/M2 | WEIGHT: 201 LBS

## 2022-01-17 DIAGNOSIS — N40.0 BENIGN NON-NODULAR PROSTATIC HYPERPLASIA WITHOUT LOWER URINARY TRACT SYMPTOMS: Primary | ICD-10-CM

## 2022-01-17 DIAGNOSIS — R97.20 ELEVATED PSA: ICD-10-CM

## 2022-01-17 LAB
BILIRUBIN URINE: NEGATIVE
BILIRUBIN, POC: NORMAL
BLOOD URINE, POC: ABNORMAL
BLOOD URINE, POC: NORMAL
CHARACTER, URINE: CLEAR
CLARITY, POC: CLEAR
COLOR, POC: YELLOW
COLOR, URINE: YELLOW
GLUCOSE URINE, POC: NORMAL
GLUCOSE URINE: NEGATIVE MG/DL
KETONES, POC: NORMAL
KETONES, URINE: NEGATIVE
LEUKOCYTE CLUMPS, URINE: NEGATIVE
LEUKOCYTE EST, POC: NORMAL
NITRITE, POC: NORMAL
NITRITE, URINE: NEGATIVE
PH, POC: NORMAL
PH, URINE: 5 (ref 5–9)
POST VOID RESIDUAL (PVR): 42 ML
PROTEIN, POC: NORMAL
PROTEIN, URINE: NEGATIVE MG/DL
SPECIFIC GRAVITY, POC: NORMAL
SPECIFIC GRAVITY, URINE: 1.02 (ref 1–1.03)
UROBILINOGEN, POC: NORMAL
UROBILINOGEN, URINE: 0.2 EU/DL (ref 0–1)

## 2022-01-17 PROCEDURE — 51798 US URINE CAPACITY MEASURE: CPT | Performed by: PHYSICIAN ASSISTANT

## 2022-01-17 PROCEDURE — 81003 URINALYSIS AUTO W/O SCOPE: CPT | Performed by: PHYSICIAN ASSISTANT

## 2022-01-17 PROCEDURE — 99213 OFFICE O/P EST LOW 20 MIN: CPT | Performed by: PHYSICIAN ASSISTANT

## 2022-01-17 RX ORDER — SILODOSIN 4 MG/1
4 CAPSULE ORAL EVERY EVENING
Qty: 90 CAPSULE | Refills: 3 | Status: SHIPPED | OUTPATIENT
Start: 2022-01-17

## 2022-01-17 RX ORDER — FINASTERIDE 5 MG/1
TABLET, FILM COATED ORAL
Qty: 90 TABLET | Refills: 3 | Status: SHIPPED | OUTPATIENT
Start: 2022-01-17 | End: 2022-04-20

## 2022-01-17 NOTE — TELEPHONE ENCOUNTER
Patient scheduled for MRI PROSTATE W WO  at [facility] on 1/28/2022 ARRIVAL 3PM FOR A 330PM MRI WITH NO PREP. Patient advised of instructions.   Order mailed/given to patient

## 2022-01-25 ENCOUNTER — NURSE ONLY (OUTPATIENT)
Dept: LAB | Age: 67
End: 2022-01-25

## 2022-01-25 DIAGNOSIS — E78.00 PURE HYPERCHOLESTEROLEMIA: ICD-10-CM

## 2022-01-25 DIAGNOSIS — E11.9 NEW ONSET TYPE 2 DIABETES MELLITUS (HCC): ICD-10-CM

## 2022-01-25 DIAGNOSIS — R97.20 ELEVATED PSA: ICD-10-CM

## 2022-01-25 LAB
ALBUMIN SERPL-MCNC: 4.6 G/DL (ref 3.5–5.1)
ALP BLD-CCNC: 56 U/L (ref 38–126)
ALT SERPL-CCNC: 29 U/L (ref 11–66)
ANION GAP SERPL CALCULATED.3IONS-SCNC: 12 MEQ/L (ref 8–16)
AST SERPL-CCNC: 18 U/L (ref 5–40)
AVERAGE GLUCOSE: 111 MG/DL (ref 70–126)
BILIRUB SERPL-MCNC: 0.6 MG/DL (ref 0.3–1.2)
BUN BLDV-MCNC: 21 MG/DL (ref 7–22)
CALCIUM SERPL-MCNC: 9.7 MG/DL (ref 8.5–10.5)
CHLORIDE BLD-SCNC: 103 MEQ/L (ref 98–111)
CHOLESTEROL, TOTAL: 147 MG/DL (ref 100–199)
CO2: 26 MEQ/L (ref 23–33)
CREAT SERPL-MCNC: 1 MG/DL (ref 0.4–1.2)
GFR SERPL CREATININE-BSD FRML MDRD: 74 ML/MIN/1.73M2
GLUCOSE BLD-MCNC: 122 MG/DL (ref 70–108)
HBA1C MFR BLD: 5.7 % (ref 4.4–6.4)
HDLC SERPL-MCNC: 62 MG/DL
LDL CHOLESTEROL CALCULATED: 67 MG/DL
POTASSIUM SERPL-SCNC: 4.5 MEQ/L (ref 3.5–5.2)
SODIUM BLD-SCNC: 141 MEQ/L (ref 135–145)
TOTAL PROTEIN: 6.9 G/DL (ref 6.1–8)
TRIGL SERPL-MCNC: 88 MG/DL (ref 0–199)

## 2022-01-27 ENCOUNTER — TELEPHONE (OUTPATIENT)
Dept: UROLOGY | Age: 67
End: 2022-01-27

## 2022-01-27 LAB — PROSTATE SPECIFIC ANTIGEN FREE: NORMAL

## 2022-01-27 NOTE — TELEPHONE ENCOUNTER
His total PSA level is 2.4, which has decreased from his value of 4.20 in October 2021. His percent free value is 25%. This means that per his age he has a 24% chance of finding prostate cancer on a prostate biopsy. The MRI will give us more information if there are any specific areas within his prostate or adjacent structures.

## 2022-01-28 ENCOUNTER — HOSPITAL ENCOUNTER (OUTPATIENT)
Dept: MRI IMAGING | Age: 67
Discharge: HOME | End: 2022-01-28
Payer: COMMERCIAL

## 2022-01-28 DIAGNOSIS — R97.20 ELEVATED PSA: ICD-10-CM

## 2022-01-28 PROCEDURE — A9579 GAD-BASE MR CONTRAST NOS,1ML: HCPCS | Performed by: PHYSICIAN ASSISTANT

## 2022-01-28 PROCEDURE — 6360000004 HC RX CONTRAST MEDICATION: Performed by: PHYSICIAN ASSISTANT

## 2022-01-28 PROCEDURE — 76377 3D RENDER W/INTRP POSTPROCES: CPT

## 2022-01-28 RX ADMIN — GADOTERIDOL 15 ML: 279.3 INJECTION, SOLUTION INTRAVENOUS at 17:31

## 2022-01-30 ENCOUNTER — TELEPHONE (OUTPATIENT)
Dept: UROLOGY | Age: 67
End: 2022-01-30

## 2022-01-31 NOTE — TELEPHONE ENCOUNTER
Please let Mr. Eddie Haney know that his MRI of the prostate demonstrated two areas with his prostate that are concerning for prostate cancer. These areas are rated a PI-RADS-4, which means that cancer is likely to be present. Prostate biopsy is the gold standard for further delineation of these areas. Our recommendation is to proceed with a MRI- Ultrasound Fusion Biopsy that can be performed within our department. We did discuss prostate biopsy at his last appointment, but if he has any additional questions we can schedule him for a follow-up appointment or I can speak to him by phone.

## 2022-02-01 ENCOUNTER — OFFICE VISIT (OUTPATIENT)
Dept: FAMILY MEDICINE CLINIC | Age: 67
End: 2022-02-01
Payer: COMMERCIAL

## 2022-02-01 VITALS
WEIGHT: 200.7 LBS | BODY MASS INDEX: 31.43 KG/M2 | HEART RATE: 61 BPM | OXYGEN SATURATION: 97 % | DIASTOLIC BLOOD PRESSURE: 66 MMHG | RESPIRATION RATE: 14 BRPM | SYSTOLIC BLOOD PRESSURE: 116 MMHG

## 2022-02-01 DIAGNOSIS — R73.01 IFG (IMPAIRED FASTING GLUCOSE): Primary | ICD-10-CM

## 2022-02-01 DIAGNOSIS — I25.10 CORONARY ARTERY DISEASE INVOLVING NATIVE CORONARY ARTERY OF NATIVE HEART WITHOUT ANGINA PECTORIS: ICD-10-CM

## 2022-02-01 DIAGNOSIS — N40.1 BPH WITH OBSTRUCTION/LOWER URINARY TRACT SYMPTOMS: ICD-10-CM

## 2022-02-01 DIAGNOSIS — N13.8 BPH WITH OBSTRUCTION/LOWER URINARY TRACT SYMPTOMS: ICD-10-CM

## 2022-02-01 DIAGNOSIS — E78.00 PURE HYPERCHOLESTEROLEMIA: ICD-10-CM

## 2022-02-01 PROCEDURE — 99214 OFFICE O/P EST MOD 30 MIN: CPT | Performed by: FAMILY MEDICINE

## 2022-02-01 ASSESSMENT — ENCOUNTER SYMPTOMS
GASTROINTESTINAL NEGATIVE: 1
RESPIRATORY NEGATIVE: 1

## 2022-02-01 NOTE — PROGRESS NOTES
Moy Dickens (:  1955) is a 74 y.o. male,Established patient, here for evaluation of the following chief complaint(s):  3 Month Follow-Up (CAD, BPH ), Discuss Labs, Results (MRI prostate ), and Colonoscopy (discuss )        Subjective   SUBJECTIVE/OBJECTIVE:  HPI:    Chief Complaint   Patient presents with    3 Month Follow-Up     CAD, BPH     Discuss Labs    Results     MRI prostate     Colonoscopy     discuss      3 month eval.  Doing well overall. Sugars much improved. Diet alone, declined   Lab Results   Component Value Date    LABA1C 5.7 2022    LABA1C 6.7 (H) 10/27/2021    LABA1C 6.0 2013     Lab Results   Component Value Date    LDLCALC 67 2022    CREATININE 1.0 2022     BP Readings from Last 3 Encounters:   22 116/66   22 134/80   21 124/60     Wt Readings from Last 3 Encounters:   22 200 lb 11.2 oz (91 kg)   22 201 lb (91.2 kg)   21 192 lb 14.4 oz (87.5 kg)     PSA down to 2.4. Recent MRI abnormal.  Has follow up scheduled with Urology.   Lab Results   Component Value Date    PSA 4.20 (H) 10/27/2021    PSA 2.20 (H) 2021    PSA 2.21 (H) 2019     Patient Active Problem List   Diagnosis    CAD (coronary artery disease)    MI, old   [employer] BPH with obstruction/lower urinary tract symptoms    Abnormal stress test     Past Surgical History:   Procedure Laterality Date    COLONOSCOPY      CORONARY ANGIOPLASTY WITH STENT PLACEMENT      DIAGNOSTIC CARDIAC CATH LAB PROCEDURE  2020    JOINT REPLACEMENT Left 2020    partial knee replacement     Social History     Tobacco Use    Smoking status: Former Smoker     Packs/day: 0.25     Years: 2.00     Pack years: 0.50     Types: Cigarettes     Start date: 1973     Quit date: 1977     Years since quittin.1    Smokeless tobacco: Never Used   Vaping Use    Vaping Use: Never used   Substance Use Topics    Alcohol use: No     Alcohol/week: 0.0 standard drinks    Drug use: No         Review of Systems   Constitutional: Negative. HENT: Negative. Respiratory: Negative. Cardiovascular: Negative. Gastrointestinal: Negative. Musculoskeletal: Negative. All other systems reviewed and are negative. Objective   Physical Exam  Vitals and nursing note reviewed. Constitutional:       General: He is not in acute distress. Appearance: Normal appearance. He is well-developed. HENT:      Head: Normocephalic and atraumatic. Right Ear: Tympanic membrane normal.      Left Ear: Tympanic membrane normal.   Eyes:      Conjunctiva/sclera: Conjunctivae normal.   Cardiovascular:      Rate and Rhythm: Normal rate and regular rhythm. Heart sounds: Normal heart sounds. No murmur heard. Pulmonary:      Effort: Pulmonary effort is normal.      Breath sounds: Normal breath sounds. No wheezing, rhonchi or rales. Abdominal:      General: There is no distension. Musculoskeletal:      Cervical back: Neck supple. Skin:     General: Skin is warm and dry. Findings: No rash (on exposed surfaces). Neurological:      General: No focal deficit present. Mental Status: He is alert. Psychiatric:         Attention and Perception: Attention normal.         Mood and Affect: Mood normal.         Speech: Speech normal.         Behavior: Behavior normal. Behavior is cooperative. Thought Content: Thought content normal.         Judgment: Judgment normal.               ASSESSMENT/PLAN:  1. IFG (impaired fasting glucose)  2. Pure hypercholesterolemia  3. Coronary artery disease involving native coronary artery of native heart without angina pectoris  4.  BPH with obstruction/lower urinary tract symptoms    -  Chronic medical problems stable  -  Labs reviewed, look much better  -  Continue current medications  -  Follow up with specialists as scheduled  -  Vascular screening h/o given  -  Declines CRS      Return in about 1 year (around 2/1/2023) for HTN. An electronic signature was used to authenticate this note.     --Jamie Aponte, DO

## 2022-02-02 NOTE — TELEPHONE ENCOUNTER
Attempted to call the patient. Voicemail left to return the call to the office. Spoke to Karl on [employer], she will have him call the office.

## 2022-02-08 NOTE — TELEPHONE ENCOUNTER
Patient does not understand the urgency to have the biopsy completed. I was going to reschedule his appointment to be seen sooner but your schedule is full and he could not come tomorrow. We can change his appointment and he will check my chart if you want to see him sooner or you can call him. There is not particular time that is best to call him. Otherwise he will keep his scheduled appointment. Please advise.  Thank you

## 2022-02-08 NOTE — TELEPHONE ENCOUNTER
Patient wanted to get a second opinion from Dr Martha Lisa because he did his first procedure. He does not want to transfer his care. Appointment offered with Dr Lakhwinder Drake. He declined. He is aware Dr Martha Lisa does not come to [facility].     Thank you

## 2022-02-08 NOTE — TELEPHONE ENCOUNTER
Please see if he would like to be scheduled with Dr. Tamar Rodriguez to review his pathology. He may feel more comfortable and have more trust in hearing the opinion of one of the surgeons in the practice.

## 2022-04-18 RX ORDER — IRBESARTAN 300 MG/1
300 TABLET ORAL DAILY
Qty: 90 TABLET | Refills: 0 | Status: SHIPPED | OUTPATIENT
Start: 2022-04-18 | End: 2022-07-11

## 2022-04-20 DIAGNOSIS — N40.0 BENIGN NON-NODULAR PROSTATIC HYPERPLASIA WITHOUT LOWER URINARY TRACT SYMPTOMS: ICD-10-CM

## 2022-04-20 RX ORDER — FINASTERIDE 5 MG/1
TABLET, FILM COATED ORAL
Qty: 90 TABLET | Refills: 3 | Status: SHIPPED | OUTPATIENT
Start: 2022-04-20

## 2022-04-25 RX ORDER — CARVEDILOL PHOSPHATE 10 MG/1
CAPSULE, EXTENDED RELEASE ORAL
Qty: 90 CAPSULE | Refills: 0 | Status: SHIPPED | OUTPATIENT
Start: 2022-04-25

## 2022-04-28 ENCOUNTER — OFFICE VISIT (OUTPATIENT)
Dept: CARDIOLOGY CLINIC | Age: 67
End: 2022-04-28
Payer: COMMERCIAL

## 2022-04-28 VITALS
DIASTOLIC BLOOD PRESSURE: 88 MMHG | WEIGHT: 202 LBS | BODY MASS INDEX: 31.71 KG/M2 | HEIGHT: 67 IN | HEART RATE: 74 BPM | SYSTOLIC BLOOD PRESSURE: 156 MMHG

## 2022-04-28 DIAGNOSIS — I10 ESSENTIAL HYPERTENSION: ICD-10-CM

## 2022-04-28 DIAGNOSIS — I25.10 CORONARY ARTERY DISEASE INVOLVING NATIVE CORONARY ARTERY OF NATIVE HEART WITHOUT ANGINA PECTORIS: Primary | ICD-10-CM

## 2022-04-28 DIAGNOSIS — E78.01 FAMILIAL HYPERCHOLESTEROLEMIA: ICD-10-CM

## 2022-04-28 PROCEDURE — 93000 ELECTROCARDIOGRAM COMPLETE: CPT | Performed by: NUCLEAR MEDICINE

## 2022-04-28 PROCEDURE — 99213 OFFICE O/P EST LOW 20 MIN: CPT | Performed by: NUCLEAR MEDICINE

## 2022-04-28 NOTE — PROGRESS NOTES
[facility] [address] [place] [address].  [address] [address]  Dept: [phone]  Dept Fax: [phone]  Loc: [phone]    Visit Date: 2022    Osiris Agudelo is a 74 y.o. male who presents todayfor:  Chief Complaint   Patient presents with    Coronary Artery Disease    Hypertension   know LAd stent   No chest pain   No changes in breathing  He is active  Bp is stable   No dizziness  No syncope        HPI:  HPI  Past Medical History:   Diagnosis Date    Arthritis     CAD (coronary artery disease)     Hyperlipidemia     Hypertension     MI, old       Past Surgical History:   Procedure Laterality Date    COLONOSCOPY      CORONARY ANGIOPLASTY WITH STENT PLACEMENT      DIAGNOSTIC CARDIAC CATH LAB PROCEDURE  2020    JOINT REPLACEMENT Left 2020    partial knee replacement     Family History   Problem Relation Age of Onset    High Blood Pressure Mother     Heart Disease Mother     Heart Disease Father     Heart Attack Father     No Known Problems Sister     No Known Problems Brother     No Known Problems Brother      Social History     Tobacco Use    Smoking status: Former Smoker     Packs/day: 0.25     Years: 2.00     Pack years: 0.50     Types: Cigarettes     Start date: 1973     Quit date: 1977     Years since quittin.3    Smokeless tobacco: Never Used   Substance Use Topics    Alcohol use: No     Alcohol/week: 0.0 standard drinks      Current Outpatient Medications   Medication Sig Dispense Refill    carvedilol (COREG CR) 10 MG CP24 extended release capsule TAKE 1 CAPSULE BY MOUTH DAILY WITH BREAKFAST 90 capsule 0    finasteride (PROSCAR) 5 MG tablet TAKE 1 TABLET BY MOUTH EVERY DAY 90 tablet 3    irbesartan (AVAPRO) 300 MG tablet Take 1 tablet by mouth daily 90 tablet 0    silodosin (RAPAFLO) 4 MG CAPS capsule Take 1 capsule by mouth every evening 90 capsule 3    rosuvastatin (CRESTOR) 5 MG tablet Take 1 tablet by mouth nightly 90 tablet 3    Cyanocobalamin (B-12 PO) Take 3,000 mg by mouth       Multiple Vitamins-Minerals (THERAPEUTIC MULTIVITAMIN-MINERALS) tablet Take 1 tablet by mouth daily      aspirin 81 MG chewable tablet Take 81 mg by mouth daily. No current facility-administered medications for this visit. Allergies   Allergen Reactions    Azithromycin      hives    Pcn [Penicillins]      hives     Health Maintenance   Topic Date Due    COVID-19 Vaccine (1) Never done    Hepatitis C screen  Never done    Colorectal Cancer Screen  Never done    Shingles Vaccine (1 of 2) Never done    AAA screen  Never done    Flu vaccine (Season Ended) 09/01/2022    Depression Screen  11/01/2022    Pneumococcal 65+ years Vaccine (2 - PCV) 11/01/2022    A1C test (Diabetic or Prediabetic)  01/25/2023    Lipids  01/25/2023    Potassium  01/25/2023    Creatinine  01/25/2023    Prostate Specific Antigen (PSA) Screening or Monitoring  01/25/2023    DTaP/Tdap/Td vaccine (2 - Td or Tdap) 03/17/2025    Hepatitis A vaccine  Aged Out    Hepatitis B vaccine  Aged Out    Hib vaccine  Aged Out    Meningococcal (ACWY) vaccine  Aged Out       Subjective:  Review of Systems  General:   No fever, no chills, some fatigue or weight loss  Pulmonary:    some dyspnea, no wheezing  Cardiac:    Denies recent chest pain,   GI:     No nausea or vomiting, no abdominal pain  Neuro:     No dizziness or light headedness,   Musculoskeletal:  No recent active issues  Extremities:   No edema, no obvious claudication       Objective:  Physical Exam  BP (!) 156/88   Pulse 74   Ht 5' 7\" (1.702 m)   Wt 202 lb (91.6 kg)   BMI 31.64 kg/m²   General:   Well developed, well nourished  Lungs:    Clear to auscultation  Heart:    Normal S1 S2, Slight murmur.  no rubs, no gallops  Abdomen:   Soft, non tender, no organomegalies, positive bowel sounds  Extremities:   No edema, no cyanosis, good peripheral pulses  Neurological: Awake, alert, oriented. No obvious focal deficits  Musculoskelatal:  No obvious deformities    Assessment:      Diagnosis Orders   1. Coronary artery disease involving native coronary artery of native heart without angina pectoris  EKG 12 lead   2. Essential hypertension  EKG 12 lead   3. Familial hypercholesterolemia  EKG 12 lead   as above  Cardiac fair for now  ECG in office was done today. I reviewed the ECG. No acute findings    Plan:  No follow-ups on file. As above  Continue risk factor modification and medical management  Thank you for allowing me to participate in the care of your patient. Please don't hesitate to contact me regarding any further issues related to the patient care    Orders Placed:  Orders Placed This Encounter   Procedures    EKG 12 lead     Order Specific Question:   Reason for Exam?     Answer: Other       Medications Prescribed:  No orders of the defined types were placed in this encounter. Discussed use, benefit, and side effects of prescribed medications. All patient questions answered. Pt voicedunderstanding. Instructed to continue current medications, diet and exercise. Continue risk factor modification and medical management. Patient agreed with treatment plan. Follow up as directed.     Electronically signedby Sloan Apley, MD on 4/28/2022 at 11:18 AM

## 2022-05-09 NOTE — TELEPHONE ENCOUNTER
Matheus Farmer \"Maulik\"  Ministerio Pereyra MD 30 minutes ago (12:49 PM)     The insurance will no longer cover the cost of the extended release but they will cover CARVEDILOL. Do I need the extended release?

## 2022-05-11 RX ORDER — CARVEDILOL 6.25 MG/1
6.25 TABLET ORAL 2 TIMES DAILY
Qty: 180 TABLET | Refills: 3 | Status: SHIPPED | OUTPATIENT
Start: 2022-05-11

## 2022-06-03 ENCOUNTER — HOSPITAL ENCOUNTER (EMERGENCY)
Age: 67
Discharge: HOME | End: 2022-06-03
Attending: EMERGENCY MEDICINE
Payer: COMMERCIAL

## 2022-06-03 VITALS
HEART RATE: 69 BPM | DIASTOLIC BLOOD PRESSURE: 85 MMHG | BODY MASS INDEX: 29.82 KG/M2 | TEMPERATURE: 97.8 F | RESPIRATION RATE: 19 BRPM | OXYGEN SATURATION: 97 % | SYSTOLIC BLOOD PRESSURE: 157 MMHG | WEIGHT: 190 LBS | HEIGHT: 67 IN

## 2022-06-03 DIAGNOSIS — R04.0 EPISTAXIS: Primary | ICD-10-CM

## 2022-06-03 PROCEDURE — 99282 EMERGENCY DEPT VISIT SF MDM: CPT

## 2022-06-03 RX ORDER — OXYMETAZOLINE HYDROCHLORIDE 0.05 G/100ML
2 SPRAY NASAL 2 TIMES DAILY
Status: DISCONTINUED | OUTPATIENT
Start: 2022-06-03 | End: 2022-06-04 | Stop reason: HOSPADM

## 2022-06-03 ASSESSMENT — ENCOUNTER SYMPTOMS
NAUSEA: 0
SHORTNESS OF BREATH: 0
COUGH: 0
VOMITING: 0
DIARRHEA: 0
BACK PAIN: 0
SORE THROAT: 0
PHOTOPHOBIA: 0
ABDOMINAL PAIN: 0
TROUBLE SWALLOWING: 0

## 2022-06-03 ASSESSMENT — PAIN - FUNCTIONAL ASSESSMENT: PAIN_FUNCTIONAL_ASSESSMENT: NONE - DENIES PAIN

## 2022-06-04 NOTE — ED TRIAGE NOTES
Patient presents to the ED with complaints of epistaxis in the left nostril that began around 2045 tonight and has not stopped since. Patient reports he has had this off and on for the last few months but it always stops. Patient reports no prior surgeries. VSS.

## 2022-06-04 NOTE — ED PROVIDER NOTES
 MI, old      Past Surgical History:   Procedure Laterality Date    COLONOSCOPY      CORONARY ANGIOPLASTY WITH STENT PLACEMENT      DIAGNOSTIC CARDIAC CATH LAB PROCEDURE  2020    JOINT REPLACEMENT Left 2020    partial knee replacement         MEDICATIONS     Current Facility-Administered Medications:     oxymetazoline (AFRIN) 0.05 % nasal spray 2 spray, 2 spray, Each Nostril, BID, Sigrid Resendiz MD    Current Outpatient Medications:     carvedilol (COREG) 6.25 MG tablet, Take 1 tablet by mouth 2 times daily, Disp: 180 tablet, Rfl: 3    carvedilol (COREG CR) 10 MG CP24 extended release capsule, TAKE 1 CAPSULE BY MOUTH DAILY WITH BREAKFAST, Disp: 90 capsule, Rfl: 0    finasteride (PROSCAR) 5 MG tablet, TAKE 1 TABLET BY MOUTH EVERY DAY, Disp: 90 tablet, Rfl: 3    irbesartan (AVAPRO) 300 MG tablet, Take 1 tablet by mouth daily, Disp: 90 tablet, Rfl: 0    silodosin (RAPAFLO) 4 MG CAPS capsule, Take 1 capsule by mouth every evening, Disp: 90 capsule, Rfl: 3    rosuvastatin (CRESTOR) 5 MG tablet, Take 1 tablet by mouth nightly, Disp: 90 tablet, Rfl: 3    Cyanocobalamin (B-12 PO), Take 3,000 mg by mouth , Disp: , Rfl:     Multiple Vitamins-Minerals (THERAPEUTIC MULTIVITAMIN-MINERALS) tablet, Take 1 tablet by mouth daily, Disp: , Rfl:     aspirin 81 MG chewable tablet, Take 81 mg by mouth daily.   , Disp: , Rfl:       SOCIAL HISTORY     Social History     Social History Narrative    Not on file     Social History     Tobacco Use    Smoking status: Former Smoker     Packs/day: 0.25     Years: 2.00     Pack years: 0.50     Types: Cigarettes     Start date: 1973     Quit date: 1977     Years since quittin.4    Smokeless tobacco: Never Used   Vaping Use    Vaping Use: Never used   Substance Use Topics    Alcohol use: No     Alcohol/week: 0.0 standard drinks    Drug use: No         ALLERGIES     Allergies   Allergen Reactions    Azithromycin      hives    Pcn [Penicillins] hives         FAMILY HISTORY     Family History   Problem Relation Age of Onset    High Blood Pressure Mother     Heart Disease Mother     Heart Disease Father     Heart Attack Father     No Known Problems Sister     No Known Problems Brother     No Known Problems Brother          PREVIOUS RECORDS   Previous records reviewed: I reviewed the patient's past medical records including relevant labs, imaging and procedures. PHYSICAL EXAM     ED Triage Vitals [06/03/22 2117]   BP Temp Temp Source Heart Rate Resp SpO2 Height Weight   (!) 157/85 97.8 °F (36.6 °C) Oral 69 19 97 % 5' 7\" (1.702 m) 190 lb (86.2 kg)     Initial vital signs and nursing assessment reviewed and normal. Body mass index is 29.76 kg/m². Pulsoximetry is normal per my interpretation. Additional Vital Signs:  Vitals:    06/03/22 2117   BP: (!) 157/85   Pulse: 69   Resp: 19   Temp: 97.8 °F (36.6 °C)   SpO2: 97%       Physical Exam  Vitals and nursing note reviewed. Constitutional:       General: He is not in acute distress. Appearance: Normal appearance. He is normal weight. He is not toxic-appearing. HENT:      Head: Normocephalic and atraumatic. Right Ear: Tympanic membrane normal.      Left Ear: Tympanic membrane normal.      Nose: Nose normal.      Comments: There is a small mount of bleeding from the anterior segment of the left nostril     Mouth/Throat:      Mouth: Mucous membranes are moist.      Pharynx: Oropharynx is clear. Eyes:      General: No scleral icterus. Extraocular Movements: Extraocular movements intact. Conjunctiva/sclera: Conjunctivae normal.      Pupils: Pupils are equal, round, and reactive to light. Cardiovascular:      Rate and Rhythm: Normal rate and regular rhythm. Pulses: Normal pulses. Heart sounds: Normal heart sounds. No murmur heard. No friction rub. No gallop. Pulmonary:      Effort: Pulmonary effort is normal.      Breath sounds: Normal breath sounds.  No wheezing or rales. Abdominal:      Palpations: Abdomen is soft. Tenderness: There is no abdominal tenderness. There is no guarding or rebound. Musculoskeletal:         General: Normal range of motion. Cervical back: Normal range of motion and neck supple. Right lower leg: No edema. Left lower leg: No edema. Skin:     General: Skin is warm and dry. Capillary Refill: Capillary refill takes less than 2 seconds. Neurological:      General: No focal deficit present. Mental Status: He is alert and oriented to person, place, and time. Cranial Nerves: No cranial nerve deficit. Sensory: No sensory deficit. Motor: No weakness. Coordination: Coordination normal.             MEDICAL DECISION MAKING   Initial Assessment:   58-year-old male with mild bleeding from the left nose. This is an anterior nosebleed      Differential diagnosis includes but is not limited to: Anterior epistaxis, posterior epistaxis    Although some of these diagnoses are unlikely they were considered in my medical decision making. Plan:    Afrin  Nose clamp        ED RESULTS   Laboratory results:  Labs Reviewed - No data to display    Radiologic studies results:  No orders to display       ED Medications administered this visit:   Medications   oxymetazoline (AFRIN) 0.05 % nasal spray 2 spray (has no administration in time range)         ED COURSE          MDM:   I sprayed Afrin twice into the left nostril. Gauze was placed in left nostril and a nose clamp was also applied. Nose clamp was held for 30 minutes and bleeding stopped. Strict return precautions and follow up instructions were discussed with the patient prior to discharge, with which the patient agrees.       MEDICATION CHANGES     New Prescriptions    No medications on file         FINAL DISPOSITION     Final diagnoses:   Epistaxis     Condition: condition: stable  Dispo: Discharge to home      This transcription was electronically signed. Parts of this transcriptions may have been dictated by use of voice recognition software and electronically transcribed, and parts may have been transcribed with the assistance of an ED scribe. The transcription may contain errors not detected in proofreading. Please refer to my supervising physician's documentation if my documentation differs.     Electronically Signed: Maria Del Rosario Hackett MD, 06/03/22, 10:09 PM         Shahida Rapp MD  Resident  06/03/22 6525

## 2022-06-06 ENCOUNTER — TELEPHONE (OUTPATIENT)
Dept: FAMILY MEDICINE CLINIC | Age: 67
End: 2022-06-06

## 2022-07-11 RX ORDER — IRBESARTAN 300 MG/1
300 TABLET ORAL DAILY
Qty: 90 TABLET | Refills: 2 | Status: SHIPPED | OUTPATIENT
Start: 2022-07-11

## 2022-10-09 DIAGNOSIS — E78.00 PURE HYPERCHOLESTEROLEMIA: ICD-10-CM

## 2022-10-10 RX ORDER — ROSUVASTATIN CALCIUM 5 MG/1
TABLET, COATED ORAL
Qty: 90 TABLET | Refills: 3 | Status: SHIPPED | OUTPATIENT
Start: 2022-10-10

## 2022-12-05 ENCOUNTER — HOSPITAL ENCOUNTER (OUTPATIENT)
Dept: MRI IMAGING | Age: 67
Discharge: HOME | End: 2022-12-05
Payer: COMMERCIAL

## 2022-12-05 DIAGNOSIS — R97.20 ELEVATED PSA: ICD-10-CM

## 2022-12-05 LAB — POC CREATININE WHOLE BLOOD: 1.3 MG/DL (ref 0.5–1.2)

## 2022-12-05 PROCEDURE — 76377 3D RENDER W/INTRP POSTPROCES: CPT

## 2022-12-05 PROCEDURE — 82565 ASSAY OF CREATININE: CPT

## 2022-12-05 PROCEDURE — A9579 GAD-BASE MR CONTRAST NOS,1ML: HCPCS | Performed by: UROLOGY

## 2022-12-05 PROCEDURE — 6360000004 HC RX CONTRAST MEDICATION: Performed by: UROLOGY

## 2022-12-05 RX ADMIN — GADOTERIDOL 20 ML: 279.3 INJECTION, SOLUTION INTRAVENOUS at 08:26

## 2023-02-03 ENCOUNTER — NURSE ONLY (OUTPATIENT)
Dept: LAB | Age: 68
End: 2023-02-03

## 2023-02-03 ENCOUNTER — OFFICE VISIT (OUTPATIENT)
Dept: FAMILY MEDICINE CLINIC | Age: 68
End: 2023-02-03
Payer: COMMERCIAL

## 2023-02-03 VITALS
RESPIRATION RATE: 16 BRPM | WEIGHT: 197.8 LBS | SYSTOLIC BLOOD PRESSURE: 136 MMHG | DIASTOLIC BLOOD PRESSURE: 84 MMHG | BODY MASS INDEX: 30.98 KG/M2 | HEART RATE: 60 BPM

## 2023-02-03 DIAGNOSIS — N13.8 BPH WITH OBSTRUCTION/LOWER URINARY TRACT SYMPTOMS: ICD-10-CM

## 2023-02-03 DIAGNOSIS — E78.00 PURE HYPERCHOLESTEROLEMIA: ICD-10-CM

## 2023-02-03 DIAGNOSIS — E78.00 PURE HYPERCHOLESTEROLEMIA: Primary | ICD-10-CM

## 2023-02-03 DIAGNOSIS — R73.01 IFG (IMPAIRED FASTING GLUCOSE): ICD-10-CM

## 2023-02-03 DIAGNOSIS — I25.10 CORONARY ARTERY DISEASE INVOLVING NATIVE CORONARY ARTERY OF NATIVE HEART WITHOUT ANGINA PECTORIS: ICD-10-CM

## 2023-02-03 DIAGNOSIS — N40.1 BPH WITH OBSTRUCTION/LOWER URINARY TRACT SYMPTOMS: ICD-10-CM

## 2023-02-03 PROBLEM — R97.20 ELEVATED PROSTATE SPECIFIC ANTIGEN (PSA): Status: ACTIVE | Noted: 2022-12-21

## 2023-02-03 LAB
ALBUMIN SERPL BCG-MCNC: 4.7 G/DL (ref 3.5–5.1)
ALP SERPL-CCNC: 61 U/L (ref 38–126)
ALT SERPL W/O P-5'-P-CCNC: 25 U/L (ref 11–66)
ANION GAP SERPL CALC-SCNC: 10 MEQ/L (ref 8–16)
AST SERPL-CCNC: 17 U/L (ref 5–40)
BASOPHILS ABSOLUTE: 0 THOU/MM3 (ref 0–0.1)
BASOPHILS NFR BLD AUTO: 0.6 %
BILIRUB SERPL-MCNC: 1.3 MG/DL (ref 0.3–1.2)
BUN SERPL-MCNC: 18 MG/DL (ref 7–22)
CALCIUM SERPL-MCNC: 10.1 MG/DL (ref 8.5–10.5)
CHLORIDE SERPL-SCNC: 103 MEQ/L (ref 98–111)
CHOLEST SERPL-MCNC: 148 MG/DL (ref 100–199)
CO2 SERPL-SCNC: 28 MEQ/L (ref 23–33)
CREAT SERPL-MCNC: 1 MG/DL (ref 0.4–1.2)
DEPRECATED MEAN GLUCOSE BLD GHB EST-ACNC: 120 MG/DL (ref 70–126)
DEPRECATED RDW RBC AUTO: 44 FL (ref 35–45)
EOSINOPHIL NFR BLD AUTO: 1.5 %
EOSINOPHILS ABSOLUTE: 0.1 THOU/MM3 (ref 0–0.4)
ERYTHROCYTE [DISTWIDTH] IN BLOOD BY AUTOMATED COUNT: 12.9 % (ref 11.5–14.5)
GFR SERPL CREATININE-BSD FRML MDRD: > 60 ML/MIN/1.73M2
GLUCOSE SERPL-MCNC: 139 MG/DL (ref 70–108)
HBA1C MFR BLD HPLC: 6 % (ref 4.4–6.4)
HCT VFR BLD AUTO: 45.3 % (ref 42–52)
HDLC SERPL-MCNC: 59 MG/DL
HGB BLD-MCNC: 14.9 GM/DL (ref 14–18)
IMM GRANULOCYTES # BLD AUTO: 0.02 THOU/MM3 (ref 0–0.07)
IMM GRANULOCYTES NFR BLD AUTO: 0.3 %
LDLC SERPL CALC-MCNC: 66 MG/DL
LYMPHOCYTES ABSOLUTE: 1.7 THOU/MM3 (ref 1–4.8)
LYMPHOCYTES NFR BLD AUTO: 22.1 %
MCH RBC QN AUTO: 30.6 PG (ref 26–33)
MCHC RBC AUTO-ENTMCNC: 32.9 GM/DL (ref 32.2–35.5)
MCV RBC AUTO: 93 FL (ref 80–94)
MONOCYTES ABSOLUTE: 0.8 THOU/MM3 (ref 0.4–1.3)
MONOCYTES NFR BLD AUTO: 10.1 %
NEUTROPHILS NFR BLD AUTO: 65.4 %
NRBC BLD AUTO-RTO: 0 /100 WBC
PLATELET # BLD AUTO: 176 THOU/MM3 (ref 130–400)
PMV BLD AUTO: 11.6 FL (ref 9.4–12.4)
POTASSIUM SERPL-SCNC: 4.4 MEQ/L (ref 3.5–5.2)
PROT SERPL-MCNC: 7 G/DL (ref 6.1–8)
RBC # BLD AUTO: 4.87 MILL/MM3 (ref 4.7–6.1)
SEGMENTED NEUTROPHILS ABSOLUTE COUNT: 5.1 THOU/MM3 (ref 1.8–7.7)
SODIUM SERPL-SCNC: 141 MEQ/L (ref 135–145)
TRIGL SERPL-MCNC: 114 MG/DL (ref 0–199)
TSH SERPL DL<=0.005 MIU/L-ACNC: 2.37 UIU/ML (ref 0.4–4.2)
WBC # BLD AUTO: 7.8 THOU/MM3 (ref 4.8–10.8)

## 2023-02-03 PROCEDURE — 99214 OFFICE O/P EST MOD 30 MIN: CPT | Performed by: FAMILY MEDICINE

## 2023-02-03 PROCEDURE — 1123F ACP DISCUSS/DSCN MKR DOCD: CPT | Performed by: FAMILY MEDICINE

## 2023-02-03 SDOH — ECONOMIC STABILITY: HOUSING INSECURITY
IN THE LAST 12 MONTHS, WAS THERE A TIME WHEN YOU DID NOT HAVE A STEADY PLACE TO SLEEP OR SLEPT IN A SHELTER (INCLUDING NOW)?: NO

## 2023-02-03 SDOH — ECONOMIC STABILITY: FOOD INSECURITY: WITHIN THE PAST 12 MONTHS, YOU WORRIED THAT YOUR FOOD WOULD RUN OUT BEFORE YOU GOT MONEY TO BUY MORE.: NEVER TRUE

## 2023-02-03 SDOH — ECONOMIC STABILITY: INCOME INSECURITY: HOW HARD IS IT FOR YOU TO PAY FOR THE VERY BASICS LIKE FOOD, HOUSING, MEDICAL CARE, AND HEATING?: NOT HARD AT ALL

## 2023-02-03 SDOH — ECONOMIC STABILITY: FOOD INSECURITY: WITHIN THE PAST 12 MONTHS, THE FOOD YOU BOUGHT JUST DIDN'T LAST AND YOU DIDN'T HAVE MONEY TO GET MORE.: NEVER TRUE

## 2023-02-03 ASSESSMENT — PATIENT HEALTH QUESTIONNAIRE - PHQ9
SUM OF ALL RESPONSES TO PHQ QUESTIONS 1-9: 0
1. LITTLE INTEREST OR PLEASURE IN DOING THINGS: 0
SUM OF ALL RESPONSES TO PHQ9 QUESTIONS 1 & 2: 0
SUM OF ALL RESPONSES TO PHQ QUESTIONS 1-9: 0
2. FEELING DOWN, DEPRESSED OR HOPELESS: 0
SUM OF ALL RESPONSES TO PHQ QUESTIONS 1-9: 0
SUM OF ALL RESPONSES TO PHQ QUESTIONS 1-9: 0

## 2023-02-03 ASSESSMENT — ENCOUNTER SYMPTOMS
RESPIRATORY NEGATIVE: 1
GASTROINTESTINAL NEGATIVE: 1

## 2023-02-03 NOTE — PROGRESS NOTES
2/3/2023    Mayra Jean (:  1955) is a 69 y.o. male, here for a preventive medicine evaluation. Chief Complaint   Patient presents with    Annual Exam    Hypertension    Health Maintenance     Needs a colonscopy     Annual eval.    Doing well. No concerns. BP Readings from Last 3 Encounters:   23 136/84   22 (!) 157/85   22 (!) 156/88     Wt Readings from Last 3 Encounters:   23 197 lb 12.8 oz (89.7 kg)   22 180 lb (81.6 kg)   22 190 lb (86.2 kg)       Patient Active Problem List   Diagnosis    CAD (coronary artery disease)    MI, old    BPH with obstruction/lower urinary tract symptoms    Abnormal stress test    Abrasion or friction burn of finger without infection    Elevated prostate specific antigen (PSA)       Review of Systems   Constitutional: Negative. HENT: Negative. Respiratory: Negative. Cardiovascular: Negative. Gastrointestinal: Negative. Musculoskeletal: Negative. All other systems reviewed and are negative. Prior to Visit Medications    Medication Sig Taking? Authorizing Provider   Coenzyme Q10 (CO Q 10 PO) Take 1 each by mouth daily Yes Historical Provider, MD   irbesartan (AVAPRO) 300 MG tablet TAKE 1 TABLET BY MOUTH DAILY Yes Keyonna Mckinney MD   carvedilol (COREG) 6.25 MG tablet Take 1 tablet by mouth 2 times daily Yes Keyonna Mckinney MD   finasteride (PROSCAR) 5 MG tablet TAKE 1 TABLET BY MOUTH EVERY DAY Yes Keyanna Lindquist PA-C   silodosin (RAPAFLO) 4 MG CAPS capsule Take 1 capsule by mouth every evening Yes Keyanna Lindquist PA-C   Cyanocobalamin (B-12 PO) Take 3,000 mg by mouth  Yes Historical Provider, MD   Multiple Vitamins-Minerals (THERAPEUTIC MULTIVITAMIN-MINERALS) tablet Take 1 tablet by mouth daily Yes Historical Provider, MD   aspirin 81 MG chewable tablet Take 81 mg by mouth daily.    Yes Historical Provider, MD        Allergies   Allergen Reactions    Azithromycin      hives    Pcn [Penicillins]      hives       Past Medical History:   Diagnosis Date    Arthritis     CAD (coronary artery disease)     Hyperlipidemia     Hypertension     MI, old        Past Surgical History:   Procedure Laterality Date    COLONOSCOPY      CORONARY ANGIOPLASTY WITH STENT PLACEMENT      DIAGNOSTIC CARDIAC CATH LAB PROCEDURE  2020    JOINT REPLACEMENT Left 2020    partial knee replacement       Social History     Socioeconomic History    Marital status:      Spouse name: Not on file    Number of children: Not on file    Years of education: Not on file    Highest education level: Not on file   Occupational History    Not on file   Tobacco Use    Smoking status: Former     Packs/day: 0.25     Years: 2.00     Pack years: 0.50     Types: Cigarettes     Start date: 1973     Quit date: 1977     Years since quittin.1    Smokeless tobacco: Never   Vaping Use    Vaping Use: Never used   Substance and Sexual Activity    Alcohol use: No     Alcohol/week: 0.0 standard drinks    Drug use: No    Sexual activity: Not on file   Other Topics Concern    Not on file   Social History Narrative    Not on file     Social Determinants of Health     Financial Resource Strain: Low Risk     Difficulty of Paying Living Expenses: Not hard at all   Food Insecurity: No Food Insecurity    Worried About [address] in the Last Year: Never true    [address] in the Last Year: Never true   Transportation Needs: Unknown    Lack of Transportation (Medical): Not on file    Lack of Transportation (Non-Medical):  No   Physical Activity: Not on file   Stress: Not on file   Social Connections: Not on file   Intimate Partner Violence: Not on file   Housing Stability: Unknown    Unable to Pay for Housing in the Last Year: Not on file    Number of Places Lived in the Last Year: Not on file    Unstable Housing in the Last Year: No        Family History   Problem Relation Age of Onset    High Blood Pressure Mother Heart Disease Mother     Heart Disease Father     Heart Attack Father     No Known Problems Sister     No Known Problems Brother     No Known Problems Brother        ADVANCE DIRECTIVE: N, <no information>    Vitals:    02/03/23 0755   BP: 136/84   Site: Left Upper Arm   Position: Sitting   Cuff Size: Large Adult   Pulse: 60   Resp: 16   Weight: 197 lb 12.8 oz (89.7 kg)     Estimated body mass index is 30.98 kg/m² as calculated from the following:    Height as of 12/5/22: 5' 7\" (1.702 m). Weight as of this encounter: 197 lb 12.8 oz (89.7 kg). Physical Exam  Vitals and nursing note reviewed. Constitutional:       General: He is not in acute distress. Appearance: Normal appearance. He is well-developed. HENT:      Head: Normocephalic and atraumatic. Right Ear: Tympanic membrane normal.      Left Ear: Tympanic membrane normal.   Eyes:      Conjunctiva/sclera: Conjunctivae normal.   Cardiovascular:      Rate and Rhythm: Normal rate and regular rhythm. Heart sounds: Normal heart sounds. No murmur heard. Pulmonary:      Effort: Pulmonary effort is normal.      Breath sounds: Normal breath sounds. No wheezing, rhonchi or rales. Abdominal:      General: There is no distension. Musculoskeletal:      Cervical back: Neck supple. Skin:     General: Skin is warm and dry. Findings: No rash (on exposed surfaces). Neurological:      General: No focal deficit present. Mental Status: He is alert. Psychiatric:         Attention and Perception: Attention normal.         Mood and Affect: Mood normal.         Speech: Speech normal.         Behavior: Behavior normal. Behavior is cooperative. Thought Content: Thought content normal.         Judgment: Judgment normal.       No flowsheet data found.     Lab Results   Component Value Date/Time    CHOL 147 01/25/2022 07:28 AM    CHOL 213 10/27/2021 07:18 AM    CHOL 196 04/28/2020 07:35 AM    CHOL 201 06/30/2018 08:13 AM    TRIG 88 01/25/2022 07:28 AM    TRIG 136 10/27/2021 07:18 AM    TRIG 135 04/28/2020 07:35 AM    HDL 62 01/25/2022 07:28 AM    HDL 56 10/27/2021 07:18 AM    HDL 51 04/28/2020 07:35 AM    LDLCALC 67 01/25/2022 07:28 AM    LDLCALC 130 10/27/2021 07:18 AM    LDLCALC 118 04/28/2020 07:35 AM    GLUCOSE 122 01/25/2022 07:28 AM    LABA1C 5.7 01/25/2022 07:28 AM    LABA1C 6.7 10/27/2021 07:18 AM    LABA1C 6.0 07/02/2013 08:06 AM       The ASCVD Risk score (Naif JUAN, et al., 2019) failed to calculate for the following reasons: The patient has a prior MI or stroke diagnosis    Immunization History   Administered Date(s) Administered    Pneumococcal Polysaccharide ([id number]) 11/01/2021    Tdap (Boostrix, Adacel) 03/17/2015       Health Maintenance   Topic Date Due    COVID-19 Vaccine (1) Never done    Diabetic Alb to Cr ratio (uACR) test  Never done    Hepatitis C screen  Never done    Colorectal Cancer Screen  Never done    Shingles vaccine (1 of 2) Never done    AAA screen  Never done    Flu vaccine (1) Never done    Depression Screen  11/01/2022    A1C test (Diabetic or Prediabetic)  01/25/2023    Lipids  01/25/2023    GFR test (Diabetes, CKD 3-4, OR last GFR 15-59)  01/25/2023    DTaP/Tdap/Td vaccine (2 - Td or Tdap) 03/17/2025    Pneumococcal 65+ years Vaccine  Completed    Hepatitis A vaccine  Aged Out    Hib vaccine  Aged Out    Meningococcal (ACWY) vaccine  Aged Out       Assessment & Plan   Pure hypercholesterolemia  -     Lipid Panel w/ Reflex Direct LDL; Future  -     Comprehensive Metabolic Panel; Future  -     TSH with Reflex; Future  IFG (impaired fasting glucose)  -     Comprehensive Metabolic Panel;  Future  -     Hemoglobin A1C; Future  Coronary artery disease involving native coronary artery of native heart without angina pectoris  -     CBC with Auto Differential; Future  BPH with obstruction/lower urinary tract symptoms    -  Chronic medical problems stable  -  Continue current medications at this time  -  Pt feels that Crestor may be causing dry mouth, wishes to change to new meds after I review his labs  -  Follow up with specialists as scheduled  -  Check labs, will call  -  Declines CRS, AAA screen    Return in about 1 year (around 2/3/2024) for HTN.         --Demetrio Head, DO

## 2023-04-28 ENCOUNTER — OFFICE VISIT (OUTPATIENT)
Dept: CARDIOLOGY CLINIC | Age: 68
End: 2023-04-28
Payer: COMMERCIAL

## 2023-04-28 VITALS
SYSTOLIC BLOOD PRESSURE: 139 MMHG | HEART RATE: 65 BPM | DIASTOLIC BLOOD PRESSURE: 80 MMHG | HEIGHT: 67 IN | WEIGHT: 202.4 LBS | BODY MASS INDEX: 31.77 KG/M2

## 2023-04-28 DIAGNOSIS — I10 ESSENTIAL HYPERTENSION: ICD-10-CM

## 2023-04-28 DIAGNOSIS — I25.10 CORONARY ARTERY DISEASE INVOLVING NATIVE CORONARY ARTERY OF NATIVE HEART WITHOUT ANGINA PECTORIS: Primary | ICD-10-CM

## 2023-04-28 DIAGNOSIS — E78.01 FAMILIAL HYPERCHOLESTEROLEMIA: ICD-10-CM

## 2023-04-28 PROCEDURE — 99213 OFFICE O/P EST LOW 20 MIN: CPT | Performed by: NUCLEAR MEDICINE

## 2023-04-28 PROCEDURE — 1123F ACP DISCUSS/DSCN MKR DOCD: CPT | Performed by: NUCLEAR MEDICINE

## 2023-04-28 PROCEDURE — 3075F SYST BP GE 130 - 139MM HG: CPT | Performed by: NUCLEAR MEDICINE

## 2023-04-28 PROCEDURE — 3079F DIAST BP 80-89 MM HG: CPT | Performed by: NUCLEAR MEDICINE

## 2023-04-28 PROCEDURE — 93000 ELECTROCARDIOGRAM COMPLETE: CPT | Performed by: NUCLEAR MEDICINE

## 2023-04-28 NOTE — PROGRESS NOTES
[address] [facility]  [employer] .  [address] [address]  Dept: [phone]  Dept Fax: [phone]  Loc: [phone]    Visit Date: 2023    Hernandez Ware is a 69 y.o. male who presents todayfor:  Chief Complaint   Patient presents with    1 Year Follow Up    Coronary Artery Disease    Hypertension     Known LAd stent   No chest pain  No changes in breathing  No dizziness  No syncope  On statins for hyperlipidemia  No other issues       HPI:  HPI  Past Medical History:   Diagnosis Date    Arthritis     CAD (coronary artery disease)     Hyperlipidemia     Hypertension     MI, old       Past Surgical History:   Procedure Laterality Date    COLONOSCOPY      CORONARY ANGIOPLASTY WITH STENT PLACEMENT      DIAGNOSTIC CARDIAC CATH LAB PROCEDURE  2020    JOINT REPLACEMENT Left 2020    partial knee replacement     Family History   Problem Relation Age of Onset    High Blood Pressure Mother     Heart Disease Mother     Heart Disease Father     Heart Attack Father     No Known Problems Sister     No Known Problems Brother     No Known Problems Brother      Social History     Tobacco Use    Smoking status: Former     Packs/day: 0.25     Years: 2.00     Pack years: 0.50     Types: Cigarettes     Start date: 1973     Quit date: 1977     Years since quittin.3    Smokeless tobacco: Never   Substance Use Topics    Alcohol use: No     Alcohol/week: 0.0 standard drinks      Current Outpatient Medications   Medication Sig Dispense Refill    Coenzyme Q10 (CO Q 10 PO) Take 1 each by mouth daily      irbesartan (AVAPRO) 300 MG tablet TAKE 1 TABLET BY MOUTH DAILY 90 tablet 2    carvedilol (COREG) 6.25 MG tablet Take 1 tablet by mouth 2 times daily 180 tablet 3    finasteride (PROSCAR) 5 MG tablet TAKE 1 TABLET BY MOUTH EVERY DAY 90 tablet 3    silodosin (RAPAFLO) 4 MG CAPS capsule Take 1 capsule by mouth every evening 90 capsule 3

## 2023-05-08 RX ORDER — IRBESARTAN 300 MG/1
300 TABLET ORAL DAILY
Qty: 90 TABLET | Refills: 3 | Status: SHIPPED | OUTPATIENT
Start: 2023-05-08

## 2023-06-05 RX ORDER — CARVEDILOL 6.25 MG/1
6.25 TABLET ORAL 2 TIMES DAILY
Qty: 180 TABLET | Refills: 3 | Status: SHIPPED | OUTPATIENT
Start: 2023-06-05

## 2023-08-03 ENCOUNTER — PATIENT MESSAGE (OUTPATIENT)
Dept: FAMILY MEDICINE CLINIC | Age: 68
End: 2023-08-03

## 2023-08-03 DIAGNOSIS — R04.0 EPISTAXIS: Primary | ICD-10-CM

## 2023-08-03 NOTE — TELEPHONE ENCOUNTER
From: Venancio Cherry  To: Dr. Hernandez Friend  Sent: 8/3/2023 1:05 PM EDT  Subject: Nose bleeds    Every 6 months or so I will get a nose bleed and it can happen even if I am not blowing my nose. I don't know if I need a referral to see an ENT but if I do I would like to see Dr Canelo Delong. If I need to see you first that is fine.  Just let me know

## 2023-08-24 DIAGNOSIS — E78.00 PURE HYPERCHOLESTEROLEMIA: ICD-10-CM

## 2023-08-24 RX ORDER — ROSUVASTATIN CALCIUM 5 MG/1
TABLET, COATED ORAL
Qty: 90 TABLET | Refills: 3 | OUTPATIENT
Start: 2023-08-24

## 2023-08-28 DIAGNOSIS — E78.00 PURE HYPERCHOLESTEROLEMIA: ICD-10-CM

## 2023-08-28 RX ORDER — ROSUVASTATIN CALCIUM 5 MG/1
5 TABLET, COATED ORAL NIGHTLY
Qty: 90 TABLET | Refills: 3 | Status: SHIPPED | OUTPATIENT
Start: 2023-08-28

## 2023-09-27 ENCOUNTER — COMMUNITY OUTREACH (OUTPATIENT)
Dept: FAMILY MEDICINE CLINIC | Age: 68
End: 2023-09-27

## 2023-11-15 ENCOUNTER — HOSPITAL ENCOUNTER (EMERGENCY)
Age: 68
Discharge: HOME | End: 2023-11-15
Payer: COMMERCIAL

## 2023-11-15 VITALS
HEART RATE: 79 BPM | TEMPERATURE: 97.8 F | SYSTOLIC BLOOD PRESSURE: 129 MMHG | RESPIRATION RATE: 14 BRPM | OXYGEN SATURATION: 97 % | DIASTOLIC BLOOD PRESSURE: 73 MMHG

## 2023-11-15 DIAGNOSIS — J06.9 ACUTE UPPER RESPIRATORY INFECTION: Primary | ICD-10-CM

## 2023-11-15 DIAGNOSIS — H61.21 IMPACTED CERUMEN OF RIGHT EAR: ICD-10-CM

## 2023-11-15 PROCEDURE — 99213 OFFICE O/P EST LOW 20 MIN: CPT

## 2023-11-15 PROCEDURE — 69209 REMOVE IMPACTED EAR WAX UNI: CPT

## 2023-11-15 RX ORDER — DOXYCYCLINE HYCLATE 100 MG
100 TABLET ORAL 2 TIMES DAILY
Qty: 14 TABLET | Refills: 0 | Status: SHIPPED | OUTPATIENT
Start: 2023-11-15 | End: 2023-11-22

## 2023-11-15 RX ORDER — BENZONATATE 200 MG/1
200 CAPSULE ORAL 3 TIMES DAILY PRN
Qty: 21 CAPSULE | Refills: 0 | Status: SHIPPED | OUTPATIENT
Start: 2023-11-15 | End: 2023-11-22

## 2023-11-15 ASSESSMENT — ENCOUNTER SYMPTOMS
SORE THROAT: 1
COUGH: 1

## 2023-11-15 ASSESSMENT — PAIN DESCRIPTION - DESCRIPTORS: DESCRIPTORS: PINS AND NEEDLES

## 2023-11-15 ASSESSMENT — PAIN DESCRIPTION - PAIN TYPE: TYPE: ACUTE PAIN

## 2023-11-15 ASSESSMENT — PAIN DESCRIPTION - LOCATION: LOCATION: THROAT

## 2023-11-15 ASSESSMENT — PAIN - FUNCTIONAL ASSESSMENT: PAIN_FUNCTIONAL_ASSESSMENT: 0-10

## 2023-11-15 ASSESSMENT — PAIN SCALES - GENERAL: PAINLEVEL_OUTOF10: 8

## 2023-11-15 ASSESSMENT — PAIN DESCRIPTION - FREQUENCY: FREQUENCY: CONTINUOUS

## 2023-11-15 NOTE — DISCHARGE INSTRUCTIONS
Zyrtec or claritin daily  Doxycycline - antibiotic  Tessalon perles - for cough  Drink lots of fluids  Salt water gurgles

## 2023-11-15 NOTE — ED NOTES
Patient discharge instructions given to pt and pt verbalized understanding, 2 px given, no other needs at this time, and pt left in stable condition.       Brandi Corrales RN  11/15/23 9348

## 2023-11-15 NOTE — ED TRIAGE NOTES
Patient walked to room 5 for sore throat and cough onset Sunday. Patient watched his granddaughter over the weekend she she was positive for strep. No other needs.

## 2023-11-16 ENCOUNTER — TELEPHONE (OUTPATIENT)
Dept: FAMILY MEDICINE CLINIC | Age: 68
End: 2023-11-16

## 2024-01-09 ENCOUNTER — OFFICE VISIT (OUTPATIENT)
Dept: ENT CLINIC | Age: 69
End: 2024-01-09
Payer: COMMERCIAL

## 2024-01-09 VITALS
WEIGHT: 202 LBS | HEART RATE: 62 BPM | HEIGHT: 67 IN | TEMPERATURE: 96.9 F | SYSTOLIC BLOOD PRESSURE: 122 MMHG | DIASTOLIC BLOOD PRESSURE: 76 MMHG | OXYGEN SATURATION: 96 % | BODY MASS INDEX: 31.71 KG/M2

## 2024-01-09 DIAGNOSIS — G47.10 HYPERSOMNOLENCE: ICD-10-CM

## 2024-01-09 DIAGNOSIS — R06.89 DYSPNEA AND RESPIRATORY ABNORMALITIES: ICD-10-CM

## 2024-01-09 DIAGNOSIS — R06.83 SNORING: ICD-10-CM

## 2024-01-09 DIAGNOSIS — R60.9 PITTING EDEMA: ICD-10-CM

## 2024-01-09 DIAGNOSIS — J34.89 NASAL SEPTAL SPUR: ICD-10-CM

## 2024-01-09 DIAGNOSIS — R06.00 DYSPNEA AND RESPIRATORY ABNORMALITIES: ICD-10-CM

## 2024-01-09 DIAGNOSIS — R04.0 EPISTAXIS, RECURRENT: Primary | ICD-10-CM

## 2024-01-09 PROCEDURE — 31231 NASAL ENDOSCOPY DX: CPT | Performed by: OTOLARYNGOLOGY

## 2024-01-09 PROCEDURE — 99205 OFFICE O/P NEW HI 60 MIN: CPT | Performed by: OTOLARYNGOLOGY

## 2024-01-09 PROCEDURE — 1123F ACP DISCUSS/DSCN MKR DOCD: CPT | Performed by: OTOLARYNGOLOGY

## 2024-01-09 NOTE — PROGRESS NOTES
[facility] [place] [facility]  [facility]  [address]  [address]  [facility] [address]  Dept: [phone]  Dept Fax: [phone]  Loc: [phone]    Josef Hernandez is a 68 y.o. male who was referred by Owen Livingston DO for:  Chief Complaint   Patient presents with    New Patient     Patient is here today epistaxis. Pt states he has 4-5 nosebleeds a year. 12/5 was the last nosebleed. Pt states that it is always the L side. Pt states they last up to a few hours at times.       HPI:     Josef Hernandez is a 68 y.o. male who presents for initial evaluation with a 2-year history of periodic nosebleeds last 1 of which was on December 5, 2023.  He states that they occur exclusively in the left nostril and exclusively following even gentle nose blowing.  He sees a small scab or clot come out and then the bleeding followed shortly after.  Sometimes it takes several hours to bethanie.  He has had several ER visits for this condition the last 1 of which required the placement of some form of nasal packing and the use of Afrin spray.  As a result he carries some Afrin with him anywhere he goes.  His wife says the same thing along with bringing small size nasal tampons whether that she think she will be able to put into his nose if the bleeding ever became overly severe.  He is positive that this is never occurred with straining to  something or having a bowel movement or any other form of sudden exertion.  He has 3 coronary artery stents which were placed over 10 years ago but is on no anticoagulations therapy with the exception of 81 mg of aspirin per day.  He has his cardiology care here.  Of note is the fact that he has a heavy snoring history and according to his wife startles from apneic intervals on a regular basis throughout the night, something she states keeps her awake.  He denies symptoms of hypersomnia and states that the only time he falls asleep in

## 2024-02-06 ENCOUNTER — OFFICE VISIT (OUTPATIENT)
Dept: FAMILY MEDICINE CLINIC | Age: 69
End: 2024-02-06
Payer: COMMERCIAL

## 2024-02-06 ENCOUNTER — NURSE ONLY (OUTPATIENT)
Dept: LAB | Age: 69
End: 2024-02-06

## 2024-02-06 VITALS
BODY MASS INDEX: 31.42 KG/M2 | WEIGHT: 200.2 LBS | RESPIRATION RATE: 16 BRPM | DIASTOLIC BLOOD PRESSURE: 70 MMHG | HEART RATE: 60 BPM | SYSTOLIC BLOOD PRESSURE: 132 MMHG | HEIGHT: 67 IN

## 2024-02-06 DIAGNOSIS — I25.10 CORONARY ARTERY DISEASE INVOLVING NATIVE CORONARY ARTERY OF NATIVE HEART WITHOUT ANGINA PECTORIS: ICD-10-CM

## 2024-02-06 DIAGNOSIS — E78.00 PURE HYPERCHOLESTEROLEMIA: ICD-10-CM

## 2024-02-06 DIAGNOSIS — N13.8 BPH WITH OBSTRUCTION/LOWER URINARY TRACT SYMPTOMS: ICD-10-CM

## 2024-02-06 DIAGNOSIS — Z12.5 SCREENING FOR PROSTATE CANCER: ICD-10-CM

## 2024-02-06 DIAGNOSIS — R73.01 IFG (IMPAIRED FASTING GLUCOSE): ICD-10-CM

## 2024-02-06 DIAGNOSIS — Z00.00 WELL ADULT HEALTH CHECK: Primary | ICD-10-CM

## 2024-02-06 DIAGNOSIS — N40.1 BPH WITH OBSTRUCTION/LOWER URINARY TRACT SYMPTOMS: ICD-10-CM

## 2024-02-06 DIAGNOSIS — N40.0 BENIGN NON-NODULAR PROSTATIC HYPERPLASIA WITHOUT LOWER URINARY TRACT SYMPTOMS: ICD-10-CM

## 2024-02-06 LAB
ALBUMIN SERPL BCG-MCNC: 4.6 G/DL (ref 3.5–5.1)
ALP SERPL-CCNC: 53 U/L (ref 38–126)
ALT SERPL W/O P-5'-P-CCNC: 26 U/L (ref 11–66)
ANION GAP SERPL CALC-SCNC: 10 MEQ/L (ref 8–16)
AST SERPL-CCNC: 19 U/L (ref 5–40)
BASOPHILS ABSOLUTE: 0.1 THOU/MM3 (ref 0–0.1)
BASOPHILS NFR BLD AUTO: 1.4 %
BILIRUB SERPL-MCNC: 1.2 MG/DL (ref 0.3–1.2)
BUN SERPL-MCNC: 21 MG/DL (ref 7–22)
CALCIUM SERPL-MCNC: 10.4 MG/DL (ref 8.5–10.5)
CHLORIDE SERPL-SCNC: 102 MEQ/L (ref 98–111)
CHOLEST SERPL-MCNC: 151 MG/DL (ref 100–199)
CO2 SERPL-SCNC: 27 MEQ/L (ref 23–33)
CREAT SERPL-MCNC: 1.2 MG/DL (ref 0.4–1.2)
DEPRECATED MEAN GLUCOSE BLD GHB EST-ACNC: 114 MG/DL (ref 70–126)
DEPRECATED RDW RBC AUTO: 45.1 FL (ref 35–45)
EOSINOPHIL NFR BLD AUTO: 2.6 %
EOSINOPHILS ABSOLUTE: 0.2 THOU/MM3 (ref 0–0.4)
ERYTHROCYTE [DISTWIDTH] IN BLOOD BY AUTOMATED COUNT: 13.2 % (ref 11.5–14.5)
GFR SERPL CREATININE-BSD FRML MDRD: > 60 ML/MIN/1.73M2
GLUCOSE SERPL-MCNC: 108 MG/DL (ref 70–108)
HBA1C MFR BLD HPLC: 5.8 % (ref 4.4–6.4)
HCT VFR BLD AUTO: 45.3 % (ref 42–52)
HDLC SERPL-MCNC: 57 MG/DL
HGB BLD-MCNC: 15.2 GM/DL (ref 14–18)
IMM GRANULOCYTES # BLD AUTO: 0.01 THOU/MM3 (ref 0–0.07)
IMM GRANULOCYTES NFR BLD AUTO: 0.2 %
LDLC SERPL CALC-MCNC: 70 MG/DL
LYMPHOCYTES ABSOLUTE: 1.7 THOU/MM3 (ref 1–4.8)
LYMPHOCYTES NFR BLD AUTO: 28.8 %
MCH RBC QN AUTO: 31.1 PG (ref 26–33)
MCHC RBC AUTO-ENTMCNC: 33.6 GM/DL (ref 32.2–35.5)
MCV RBC AUTO: 92.8 FL (ref 80–94)
MONOCYTES ABSOLUTE: 0.7 THOU/MM3 (ref 0.4–1.3)
MONOCYTES NFR BLD AUTO: 11.4 %
NEUTROPHILS NFR BLD AUTO: 55.6 %
NRBC BLD AUTO-RTO: 0 /100 WBC
PLATELET # BLD AUTO: 187 THOU/MM3 (ref 130–400)
PMV BLD AUTO: 11.5 FL (ref 9.4–12.4)
POTASSIUM SERPL-SCNC: 4.2 MEQ/L (ref 3.5–5.2)
PROT SERPL-MCNC: 7.5 G/DL (ref 6.1–8)
PSA SERPL-MCNC: 2.16 NG/ML (ref 0–1)
RBC # BLD AUTO: 4.88 MILL/MM3 (ref 4.7–6.1)
SEGMENTED NEUTROPHILS ABSOLUTE COUNT: 3.3 THOU/MM3 (ref 1.8–7.7)
SODIUM SERPL-SCNC: 139 MEQ/L (ref 135–145)
TRIGL SERPL-MCNC: 122 MG/DL (ref 0–199)
TSH SERPL DL<=0.005 MIU/L-ACNC: 3.15 UIU/ML (ref 0.4–4.2)
WBC # BLD AUTO: 5.9 THOU/MM3 (ref 4.8–10.8)

## 2024-02-06 PROCEDURE — 99397 PER PM REEVAL EST PAT 65+ YR: CPT | Performed by: FAMILY MEDICINE

## 2024-02-06 RX ORDER — SILODOSIN 4 MG/1
4 CAPSULE ORAL EVERY EVENING
Qty: 90 CAPSULE | Refills: 3 | Status: SHIPPED | OUTPATIENT
Start: 2024-02-06

## 2024-02-06 SDOH — ECONOMIC STABILITY: FOOD INSECURITY: WITHIN THE PAST 12 MONTHS, THE FOOD YOU BOUGHT JUST DIDN'T LAST AND YOU DIDN'T HAVE MONEY TO GET MORE.: NEVER TRUE

## 2024-02-06 SDOH — ECONOMIC STABILITY: INCOME INSECURITY: HOW HARD IS IT FOR YOU TO PAY FOR THE VERY BASICS LIKE FOOD, HOUSING, MEDICAL CARE, AND HEATING?: NOT HARD AT ALL

## 2024-02-06 SDOH — ECONOMIC STABILITY: FOOD INSECURITY: WITHIN THE PAST 12 MONTHS, YOU WORRIED THAT YOUR FOOD WOULD RUN OUT BEFORE YOU GOT MONEY TO BUY MORE.: NEVER TRUE

## 2024-02-06 ASSESSMENT — PATIENT HEALTH QUESTIONNAIRE - PHQ9
2. FEELING DOWN, DEPRESSED OR HOPELESS: 0
SUM OF ALL RESPONSES TO PHQ9 QUESTIONS 1 & 2: 0
SUM OF ALL RESPONSES TO PHQ QUESTIONS 1-9: 0
SUM OF ALL RESPONSES TO PHQ QUESTIONS 1-9: 0
1. LITTLE INTEREST OR PLEASURE IN DOING THINGS: 0
SUM OF ALL RESPONSES TO PHQ QUESTIONS 1-9: 0
SUM OF ALL RESPONSES TO PHQ QUESTIONS 1-9: 0

## 2024-02-06 ASSESSMENT — ENCOUNTER SYMPTOMS
RESPIRATORY NEGATIVE: 1
GASTROINTESTINAL NEGATIVE: 1

## 2024-02-06 NOTE — PROGRESS NOTES
He is alert.   Psychiatric:         Attention and Perception: Attention normal.         Mood and Affect: Mood normal.         Speech: Speech normal.         Behavior: Behavior normal. Behavior is cooperative.         Thought Content: Thought content normal.         Judgment: Judgment normal.             Latest Ref Rng & Units 2/3/2023     8:13 AM 12/5/2022     8:40 AM 9/26/2022     7:10 AM   LAB PRIMARY CARE   A1C 4.4 - 6.4 % 6.0      A1C POC 4.4 - 6.4 % 6.0      GLU random 70 - 108 mg/dL 139      CHOL 100 - 199 mg/dL 148      TRIG 0 - 199 mg/dL 114      HDL mg/dL 59      LDL CALC mg/dL 66       - 145 meq/L 141      K 3.5 - 5.2 meq/L 4.4      BUN 7 - 22 mg/dL 18      CR 0.4 - 1.2 mg/dL 1.0      CA 8.5 - 10.5 mg/dL 10.1      ALT 11 - 66 U/L 25      AST 5 - 40 U/L 17      TSH 0.400 - 4.200 uIU/mL 2.370      PSA 0.00 - 1.00 ng/mL  2.26  2.03    HGB 14.0 - 18.0 gm/dl 14.9          Lab Results   Component Value Date/Time    CHOL 148 02/03/2023 08:13 AM    CHOL 147 01/25/2022 07:28 AM    CHOL 213 10/27/2021 07:18 AM    TRIG 114 02/03/2023 08:13 AM    TRIG 88 01/25/2022 07:28 AM    TRIG 136 10/27/2021 07:18 AM    HDL 59 02/03/2023 08:13 AM    HDL 62 01/25/2022 07:28 AM    HDL 56 10/27/2021 07:18 AM    LDLCALC 66 02/03/2023 08:13 AM    LDLCALC 67 01/25/2022 07:28 AM    LDLCALC 130 10/27/2021 07:18 AM    GLUCOSE 139 02/03/2023 08:13 AM    LABA1C 6.0 02/03/2023 08:13 AM    LABA1C 5.7 01/25/2022 07:28 AM    LABA1C 6.7 10/27/2021 07:18 AM       The ASCVD Risk score (Naif DK, et al., 2019) failed to calculate for the following reasons:    The patient has a prior MI or stroke diagnosis    Immunization History   Administered Date(s) Administered    Pneumococcal, PPSV23, PNEUMOVAX 23, (age 2y+), SC/IM, 0.5mL 11/01/2021    TDaP, ADACEL (age 10y-64y), BOOSTRIX (age 10y+), IM, 0.5mL 03/17/2015       Health Maintenance   Topic Date Due    COVID-19 Vaccine (1) Never done    Hepatitis C screen  Never done    Colorectal Cancer

## 2024-02-13 ENCOUNTER — OFFICE VISIT (OUTPATIENT)
Dept: PULMONOLOGY | Age: 69
End: 2024-02-13
Payer: COMMERCIAL

## 2024-02-13 VITALS
TEMPERATURE: 97.7 F | WEIGHT: 197.6 LBS | SYSTOLIC BLOOD PRESSURE: 138 MMHG | BODY MASS INDEX: 31.01 KG/M2 | HEIGHT: 67 IN | DIASTOLIC BLOOD PRESSURE: 78 MMHG | OXYGEN SATURATION: 97 % | HEART RATE: 60 BPM

## 2024-02-13 DIAGNOSIS — R06.83 LOUD SNORING: ICD-10-CM

## 2024-02-13 DIAGNOSIS — R06.81 WITNESSED EPISODE OF APNEA: Primary | ICD-10-CM

## 2024-02-13 DIAGNOSIS — I10 PRIMARY HYPERTENSION: ICD-10-CM

## 2024-02-13 DIAGNOSIS — R60.0 BILATERAL LEG EDEMA: ICD-10-CM

## 2024-02-13 DIAGNOSIS — E66.9 OBESITY (BMI 30.0-34.9): ICD-10-CM

## 2024-02-13 PROCEDURE — 1123F ACP DISCUSS/DSCN MKR DOCD: CPT | Performed by: INTERNAL MEDICINE

## 2024-02-13 PROCEDURE — 99204 OFFICE O/P NEW MOD 45 MIN: CPT | Performed by: INTERNAL MEDICINE

## 2024-02-13 PROCEDURE — 3078F DIAST BP <80 MM HG: CPT | Performed by: INTERNAL MEDICINE

## 2024-02-13 PROCEDURE — 3075F SYST BP GE 130 - 139MM HG: CPT | Performed by: INTERNAL MEDICINE

## 2024-02-13 NOTE — PROGRESS NOTES
breath sounds normal. No stridor. No respiratory distress.  No wheezes. No rales.   Abdominal: Soft. No distension. No tenderness.   Musculoskeletal: Normal range of motion.   Lymphadenopathy:  No cervical adenopathy.   Neurological: Alert and oriented to person, place, and time. No focal deficits.  Skin: Skin is warm and dry. Patient is not diaphoretic.   Psychiatric: Normal behavior with normal mood and affect.    Diagnostic Data:    Assessment    Diagnosis Orders   1. Witnessed episode of apnea  Home Sleep Study      2. Loud snoring  Home Sleep Study      3. Obesity (BMI 30.0-34.9)  Home Sleep Study      4. Primary hypertension  Home Sleep Study      5. Bilateral leg edema              Plan   Orders Placed This Encounter   Procedures    Home Sleep Study     Standing Status:   Future     Standing Expiration Date:   2/13/2025     Order Specific Question:   Location For Sleep Study     Answer:   [place]     Order Specific Question:   Select Sleep Lab Location     Answer:   [facility]          Mask Desensitization and Pre study teaching? No  Weight Loss Information Given? Yes  Sleep Hygiene Discussed? Yes    -He was advised to call [employer] regarding supplies if needed.  -He call my office for earlier appointment if needed for worsening of sleep symptoms.  -Josef Hernandez educated about my impression and plan. Patient verbalizes understanding.

## 2024-03-20 ENCOUNTER — TELEPHONE (OUTPATIENT)
Dept: CARDIOLOGY CLINIC | Age: 69
End: 2024-03-20

## 2024-03-20 NOTE — TELEPHONE ENCOUNTER
Eulalia-there aren't any openings prior to his scheduled appt.   Will address clearance at appt.   Clearance added to appt desk notes.

## 2024-03-20 NOTE — TELEPHONE ENCOUNTER
Josef is scheduled for surgery with Dr Limon on 5/2/24. He is coming back in to see Dr Limon to review the surgical plan on 4/23/24. (Tentative surgery plan is for a Drug Induced sleep endoscopy, Septoplasty-limited, resection of spur).    We are requesting a cardiac clearance. He does have an annual visit with you on 4/29/24 (3 days prior to surgery).    Please advise.    Thank you!

## 2024-03-22 ENCOUNTER — HOSPITAL ENCOUNTER (OUTPATIENT)
Dept: SLEEP CENTER | Age: 69
End: 2024-03-22
Payer: COMMERCIAL

## 2024-03-22 DIAGNOSIS — R06.81 WITNESSED EPISODE OF APNEA: ICD-10-CM

## 2024-03-22 DIAGNOSIS — R06.83 LOUD SNORING: ICD-10-CM

## 2024-03-22 DIAGNOSIS — I10 PRIMARY HYPERTENSION: ICD-10-CM

## 2024-03-22 DIAGNOSIS — E66.9 OBESITY (BMI 30.0-34.9): ICD-10-CM

## 2024-03-22 PROCEDURE — 95806 SLEEP STUDY UNATT&RESP EFFT: CPT

## 2024-03-22 NOTE — PROGRESS NOTES
Josef presents today for a HST instruction and demonstration on unit # 1025. Questions were asked and answers given.  He was able to return demonstration and verbalized understanding.  The sleep center control room phone number was provided in case questions arise during the study. Informed patient to call 911 in case of an emergency.   He states he will return the unit tomorrow before 1000.                       Title:  Home Sleep Apnea Testing (HSAT)     Approved by:  Yuridia Klein MD        Approval Date: December, 2021  Next Review: December, 2023       Responsible Party:  Cleopatra Calzada  Institution/Entities Applies to:    [facility]    Policy Number:  None      Document Type:  Such as Guideline, Policy,   Policy & Procedure, or Procedure, Instructions   Manual:  Policy and Procedures     Section: IV  Policy Start Date: June, 2011       HOME SLEEP APNEA TESTING (HSAT)      PURPOSE: To ensure home sleep apnea testing (HSAT) conducted by the sleep facility adheres to the current AASM practice parameters, clinical practice guidelines, best practice and clinical guidelines in regard to the diagnosis of TYREE in adults.       POLICY:      HSAT is a method of recording certain parameters which will target and measure, minimally, heart rate, oxygen saturation, respiratory airflow, respiratory effort and snoring for the purpose of evaluating a patient for TYREE.       HSAT will be performed in conjunction with a comprehensive sleep evaluation by an appropriately licensed sleep facility medical staff member. All portable monitoring equipment will be FDA-approved and appropriately maintained to ensure patient safety and efficiency of the test.       PROCEDURE:      An order from a licensed sleep physician along with appropriate medical history documenting the indication for HSAT that complies with the AASM practice parameters.    All tests will be performed, and records will

## 2024-03-27 DIAGNOSIS — G47.33 OSA (OBSTRUCTIVE SLEEP APNEA): Primary | ICD-10-CM

## 2024-04-05 NOTE — PROGRESS NOTES
sleep study remained <88% for a period of 13.3 minutes (2.8%). Hypersomnia (Excessive daytime sleepiness) with [place] sleepiness score: 3 noted on H&P. Obstructive hypopneas were scored using the AASM scoring desaturation rule 1B, 4%.    Weight stable / unchanged  SAQLI: 94  ESS: 3      Sleep study data                         Past Medical hx   PMH:  Past Medical History:   Diagnosis Date    Arthritis     CAD (coronary artery disease)     Hyperlipidemia     Hypertension     MI, old      SURGICAL HISTORY:  Past Surgical History:   Procedure Laterality Date    COLONOSCOPY      CORONARY ANGIOPLASTY WITH STENT PLACEMENT      DIAGNOSTIC CARDIAC CATH LAB PROCEDURE  2020    JOINT REPLACEMENT Left 2020    partial knee replacement     SOCIAL HISTORY:  Social History     Tobacco Use    Smoking status: Former     Current packs/day: 0.00     Average packs/day: 0.3 packs/day for 4.0 years (1.0 ttl pk-yrs)     Types: Cigarettes     Start date: 1973     Quit date: 1977     Years since quittin.3    Smokeless tobacco: Never    Tobacco comments:     Quit smoking in    Vaping Use    Vaping Use: Never used   Substance Use Topics    Alcohol use: No     Alcohol/week: 0.0 standard drinks of alcohol    Drug use: Never     ALLERGIES:  Allergies   Allergen Reactions    Azithromycin      hives    Pcn [Penicillins]      hives     FAMILY HISTORY:  Family History   Problem Relation Age of Onset    High Blood Pressure Mother     Heart Disease Mother     Heart Disease Father     Heart Attack Father     No Known Problems Sister     No Known Problems Brother     No Known Problems Brother      CURRENT MEDICATIONS:  Current Outpatient Medications   Medication Sig Dispense Refill    silodosin (RAPAFLO) 4 MG CAPS capsule Take 1 capsule by mouth every evening 90 capsule 3    rosuvastatin (CRESTOR) 5 MG tablet Take 1 tablet by mouth nightly 90 tablet 3    carvedilol (COREG) 6.25 MG tablet Take 1 tablet by mouth 2 times daily

## 2024-04-09 ENCOUNTER — OFFICE VISIT (OUTPATIENT)
Dept: PULMONOLOGY | Age: 69
End: 2024-04-09
Payer: COMMERCIAL

## 2024-04-09 VITALS
TEMPERATURE: 97.7 F | OXYGEN SATURATION: 96 % | HEART RATE: 68 BPM | DIASTOLIC BLOOD PRESSURE: 84 MMHG | HEIGHT: 67 IN | SYSTOLIC BLOOD PRESSURE: 138 MMHG | BODY MASS INDEX: 31.86 KG/M2 | WEIGHT: 203 LBS

## 2024-04-09 DIAGNOSIS — G47.33 OSA (OBSTRUCTIVE SLEEP APNEA): Primary | ICD-10-CM

## 2024-04-09 PROCEDURE — 1123F ACP DISCUSS/DSCN MKR DOCD: CPT

## 2024-04-09 PROCEDURE — 99214 OFFICE O/P EST MOD 30 MIN: CPT

## 2024-04-09 ASSESSMENT — ENCOUNTER SYMPTOMS
COUGH: 0
WHEEZING: 0
RHINORRHEA: 0
CHEST TIGHTNESS: 0
SHORTNESS OF BREATH: 0
SORE THROAT: 0

## 2024-04-23 ENCOUNTER — OFFICE VISIT (OUTPATIENT)
Dept: ENT CLINIC | Age: 69
End: 2024-04-23
Payer: COMMERCIAL

## 2024-04-23 VITALS
SYSTOLIC BLOOD PRESSURE: 136 MMHG | TEMPERATURE: 97.4 F | WEIGHT: 199.5 LBS | OXYGEN SATURATION: 98 % | RESPIRATION RATE: 16 BRPM | BODY MASS INDEX: 31.31 KG/M2 | DIASTOLIC BLOOD PRESSURE: 84 MMHG | HEART RATE: 59 BPM | HEIGHT: 67 IN

## 2024-04-23 DIAGNOSIS — J34.89 NASAL SEPTAL SPUR: ICD-10-CM

## 2024-04-23 DIAGNOSIS — G47.33 SEVERE OBSTRUCTIVE SLEEP APNEA: ICD-10-CM

## 2024-04-23 DIAGNOSIS — R04.0 EPISTAXIS, RECURRENT: Primary | ICD-10-CM

## 2024-04-23 DIAGNOSIS — G47.10 HYPERSOMNOLENCE: ICD-10-CM

## 2024-04-23 DIAGNOSIS — R60.9 PITTING EDEMA: ICD-10-CM

## 2024-04-23 PROCEDURE — 1123F ACP DISCUSS/DSCN MKR DOCD: CPT | Performed by: OTOLARYNGOLOGY

## 2024-04-23 PROCEDURE — 99214 OFFICE O/P EST MOD 30 MIN: CPT | Performed by: OTOLARYNGOLOGY

## 2024-04-23 NOTE — PROGRESS NOTES
[facility] [place] [facility]  [facility]  [address]   [address]  [facility] [address]  Dept: [phone]  Dept Fax: [phone]  Loc: [phone]    Josef Hernandez is a 68 y.o. male who was referred by No ref. provider found for:  Chief Complaint   Patient presents with    Follow-up     Patient here for 10 week follow up for epistaxis and possible pre op for DISE. Patient states he is scheduled to talk to someone about getting a CPAP at the end of the month. No additional bleeds.        HPI:     Josef Hernandez is a 68 y.o. male with a history of recurring epistaxis from his left nasal airway associated with a wide deep septal spur that tears into his inferior turbinate.  He states that since he no longer blows his nose on the left side with any force, he has had no nose bleeding whatsoever since his last visit.  In addition he has been diagnosed with severe obstructive sleep apnea and is in the process of getting his CPAP set up so he has had no experience with that at this point.  He reports having been very \"sleepless\" and was fitful in the sleep lab so he was surprised that so much information was got of his sleeping tendency.  From his perspective he slept only a little while.     History:     Allergies   Allergen Reactions    Azithromycin      hives    Pcn [Penicillins]      hives     Current Outpatient Medications   Medication Sig Dispense Refill    silodosin (RAPAFLO) 4 MG CAPS capsule Take 1 capsule by mouth every evening 90 capsule 3    rosuvastatin (CRESTOR) 5 MG tablet Take 1 tablet by mouth nightly 90 tablet 3    carvedilol (COREG) 6.25 MG tablet Take 1 tablet by mouth 2 times daily 180 tablet 3    irbesartan (AVAPRO) 300 MG tablet TAKE 1 TABLET BY MOUTH DAILY 90 tablet 3    Coenzyme Q10 (CO Q 10 PO) Take 1 each by mouth daily      finasteride (PROSCAR) 5 MG tablet TAKE 1 TABLET BY MOUTH EVERY DAY 90 tablet 3    Cyanocobalamin (B-12 PO) Take 3,000 mg by

## 2024-04-23 NOTE — TELEPHONE ENCOUNTER
The patient was just seen by Dr Limon today. His surgery has been cancelled for 5/2/24. He will follow up with Dr Limon in October on this matter.    You can cancel the request for clearance at this time.  Thank you!

## 2024-04-29 ENCOUNTER — OFFICE VISIT (OUTPATIENT)
Dept: CARDIOLOGY CLINIC | Age: 69
End: 2024-04-29
Payer: COMMERCIAL

## 2024-04-29 VITALS
HEART RATE: 69 BPM | DIASTOLIC BLOOD PRESSURE: 68 MMHG | SYSTOLIC BLOOD PRESSURE: 134 MMHG | HEIGHT: 67 IN | WEIGHT: 202.2 LBS | BODY MASS INDEX: 31.74 KG/M2

## 2024-04-29 DIAGNOSIS — I25.10 CORONARY ARTERY DISEASE INVOLVING NATIVE CORONARY ARTERY OF NATIVE HEART WITHOUT ANGINA PECTORIS: Primary | ICD-10-CM

## 2024-04-29 DIAGNOSIS — I10 PRIMARY HYPERTENSION: ICD-10-CM

## 2024-04-29 DIAGNOSIS — E78.01 FAMILIAL HYPERCHOLESTEROLEMIA: ICD-10-CM

## 2024-04-29 PROCEDURE — 1123F ACP DISCUSS/DSCN MKR DOCD: CPT | Performed by: NUCLEAR MEDICINE

## 2024-04-29 PROCEDURE — 3075F SYST BP GE 130 - 139MM HG: CPT | Performed by: NUCLEAR MEDICINE

## 2024-04-29 PROCEDURE — 99213 OFFICE O/P EST LOW 20 MIN: CPT | Performed by: NUCLEAR MEDICINE

## 2024-04-29 PROCEDURE — 3078F DIAST BP <80 MM HG: CPT | Performed by: NUCLEAR MEDICINE

## 2024-04-29 PROCEDURE — 93000 ELECTROCARDIOGRAM COMPLETE: CPT | Performed by: NUCLEAR MEDICINE

## 2024-04-29 NOTE — PROGRESS NOTES
300 MG tablet TAKE 1 TABLET BY MOUTH DAILY 90 tablet 3    Coenzyme Q10 (CO Q 10 PO) Take 1 each by mouth daily      finasteride (PROSCAR) 5 MG tablet TAKE 1 TABLET BY MOUTH EVERY DAY 90 tablet 3    Cyanocobalamin (B-12 PO) Take 3,000 mg by mouth       Multiple Vitamins-Minerals (THERAPEUTIC MULTIVITAMIN-MINERALS) tablet Take 1 tablet by mouth daily      aspirin 81 MG chewable tablet Take 1 tablet by mouth daily       No current facility-administered medications for this visit.     Allergies   Allergen Reactions    Azithromycin      hives    Pcn [Penicillins]      hives     Health Maintenance   Topic Date Due    COVID-19 Vaccine (1) Never done    Hepatitis C screen  Never done    Colorectal Cancer Screen  Never done    Shingles vaccine (1 of 2) Never done    Respiratory Syncytial Virus (RSV) Pregnant or age 60 yrs+ (1 - 1-dose 60+ series) Never done    AAA screen  Never done    Pneumococcal 65+ years Vaccine (2 of 2 - PCV) 11/01/2022    Flu vaccine (Season Ended) 08/01/2024    A1C test (Diabetic or Prediabetic)  02/06/2025    Lipids  02/06/2025    Depression Screen  02/06/2025    DTaP/Tdap/Td vaccine (2 - Td or Tdap) 03/17/2025    Hepatitis A vaccine  Aged Out    Hepatitis B vaccine  Aged Out    Hib vaccine  Aged Out    Polio vaccine  Aged Out    Meningococcal (ACWY) vaccine  Aged Out    GFR test (Diabetes, CKD 3-4, OR last GFR 15-59)  Discontinued    Prostate Specific Antigen (PSA) Screening or Monitoring  Discontinued       Subjective:  General:   No fever, no chills, No fatigue or weight loss  Pulmonary:    No dyspnea, no wheezing  Cardiac:    Denies recent chest pain,   GI:     No nausea or vomiting, no abdominal pain  Neuro:    No dizziness or light headedness,   Musculoskeletal:  No recent active issues  Extremities:   No edema, no obvious claudication       Objective:  General:   Well developed, well nourished  Lungs:   Clear to auscultation  Heart:    Normal S1 S2, Slight murmur. no rubs, no gallops  Abdomen:

## 2024-05-24 RX ORDER — IRBESARTAN 300 MG/1
300 TABLET ORAL DAILY
Qty: 90 TABLET | Refills: 3 | Status: SHIPPED | OUTPATIENT
Start: 2024-05-24

## 2024-07-11 RX ORDER — CARVEDILOL 6.25 MG/1
6.25 TABLET ORAL 2 TIMES DAILY
Qty: 180 TABLET | Refills: 3 | Status: SHIPPED | OUTPATIENT
Start: 2024-07-11

## 2024-07-16 NOTE — PROGRESS NOTES
[facility]      Josef Hernandez         [id number]  7/17/2024   Chief Complaint   Patient presents with    Follow-up     Tani 3 month f/u after pap setup with download        Patient of Dr. Ohara     Assessment/Plan   1. TNAI (obstructive sleep apnea)    2. Difficulty using continuous positive airway pressure (CPAP) device    3. Obesity (BMI 30.0-34.9)     -Provided with F&P FFM fit pack  -Added 15 min ramp on his CPAP machine  -changed from autoset to CPAP 12  - Misc. Devices (CPAP MACHINE) MISC; by Does not apply route Please change his current CPAP/BiPAP pressure to a CPAP 12 cm H2O  -Please pull download in 2 weeks - may need to be 1 month as he is leaving for vacation next week  -Yearly supply order placed? No  -Download was personally reviewed and discussed with patient at length.  -Patient's symptoms and AHI are somewhat controlled with current settings of 6-20 cmH2O. Change current pressure settings to CPAP 12 cmH2O.  -Advised to continue current positive airway pressure therapy with above described pressure.   -Advised to keep good compliance with current recommended pressure to get optimal results and clinical improvement  -Recommend 7-9 hours of sleep with PAP  -Instructed to call [employer] regarding supplies if needed.   -Patient to call my office for earlier appointment if needed for worsening of sleep symptoms.   -Patient is not to drive if feeling sleepy  -Counseled patient on weight loss    Educated about my impression and plan. Patient verbalizes understanding.      Return in about 6 months (around 1/17/2025) for TANI after pressure change. with download.      Subjective   Presentation:   Josef presents for sleep medicine follow up for obstructive sleep apnea.  Since the last visit, Josef has not been doing well with CPAP use. Initial mask gave him a sore on bridge of his nose. He is using large FFM and c/o leaking causing him to take it off at night. He

## 2024-07-17 ENCOUNTER — OFFICE VISIT (OUTPATIENT)
Dept: PULMONOLOGY | Age: 69
End: 2024-07-17
Payer: COMMERCIAL

## 2024-07-17 VITALS
BODY MASS INDEX: 31.96 KG/M2 | WEIGHT: 203.6 LBS | TEMPERATURE: 98.2 F | HEIGHT: 67 IN | DIASTOLIC BLOOD PRESSURE: 80 MMHG | HEART RATE: 71 BPM | SYSTOLIC BLOOD PRESSURE: 124 MMHG | OXYGEN SATURATION: 98 %

## 2024-07-17 DIAGNOSIS — E66.9 OBESITY (BMI 30.0-34.9): ICD-10-CM

## 2024-07-17 DIAGNOSIS — Z78.9 DIFFICULTY USING CONTINUOUS POSITIVE AIRWAY PRESSURE (CPAP) DEVICE: ICD-10-CM

## 2024-07-17 DIAGNOSIS — G47.33 OSA (OBSTRUCTIVE SLEEP APNEA): Primary | ICD-10-CM

## 2024-07-17 PROCEDURE — 1123F ACP DISCUSS/DSCN MKR DOCD: CPT

## 2024-07-17 PROCEDURE — 99214 OFFICE O/P EST MOD 30 MIN: CPT

## 2024-07-17 ASSESSMENT — ENCOUNTER SYMPTOMS
SHORTNESS OF BREATH: 0
RHINORRHEA: 0
COUGH: 0
WHEEZING: 0
SORE THROAT: 0

## 2024-08-29 DIAGNOSIS — E78.00 PURE HYPERCHOLESTEROLEMIA: ICD-10-CM

## 2024-08-30 RX ORDER — ROSUVASTATIN CALCIUM 5 MG/1
5 TABLET, COATED ORAL NIGHTLY
Qty: 90 TABLET | Refills: 3 | Status: SHIPPED | OUTPATIENT
Start: 2024-08-30

## 2024-10-02 ENCOUNTER — TELEPHONE (OUTPATIENT)
Dept: ENT CLINIC | Age: 69
End: 2024-10-02

## 2024-10-02 ENCOUNTER — OFFICE VISIT (OUTPATIENT)
Dept: ENT CLINIC | Age: 69
End: 2024-10-02
Payer: COMMERCIAL

## 2024-10-02 VITALS
BODY MASS INDEX: 31.16 KG/M2 | HEART RATE: 61 BPM | OXYGEN SATURATION: 96 % | SYSTOLIC BLOOD PRESSURE: 136 MMHG | HEIGHT: 67 IN | WEIGHT: 198.5 LBS | TEMPERATURE: 97.2 F | DIASTOLIC BLOOD PRESSURE: 80 MMHG

## 2024-10-02 DIAGNOSIS — J34.89 NASAL SEPTAL SPUR: ICD-10-CM

## 2024-10-02 DIAGNOSIS — Z78.9 INTOLERANCE OF CONTINUOUS POSITIVE AIRWAY PRESSURE (CPAP) VENTILATION: ICD-10-CM

## 2024-10-02 DIAGNOSIS — R06.5 CHRONIC MOUTH BREATHING: ICD-10-CM

## 2024-10-02 DIAGNOSIS — R06.89 DYSPNEA AND RESPIRATORY ABNORMALITIES: ICD-10-CM

## 2024-10-02 DIAGNOSIS — J34.829 NASAL VALVE COLLAPSE: ICD-10-CM

## 2024-10-02 DIAGNOSIS — G47.33 SEVERE OBSTRUCTIVE SLEEP APNEA: ICD-10-CM

## 2024-10-02 DIAGNOSIS — J34.89 NASAL OBSTRUCTION: ICD-10-CM

## 2024-10-02 DIAGNOSIS — J34.89 NASAL SEPTAL SPUR: Primary | ICD-10-CM

## 2024-10-02 DIAGNOSIS — J34.3 HYPERTROPHY OF POSTERIOR END OF INFERIOR TURBINATE: ICD-10-CM

## 2024-10-02 DIAGNOSIS — J34.2 NASAL SEPTAL DEVIATION: ICD-10-CM

## 2024-10-02 DIAGNOSIS — Z01.818 PRE-OP TESTING: Primary | ICD-10-CM

## 2024-10-02 DIAGNOSIS — R06.00 DYSPNEA AND RESPIRATORY ABNORMALITIES: ICD-10-CM

## 2024-10-02 DIAGNOSIS — G47.10 HYPERSOMNOLENCE: ICD-10-CM

## 2024-10-02 PROCEDURE — 99214 OFFICE O/P EST MOD 30 MIN: CPT | Performed by: OTOLARYNGOLOGY

## 2024-10-02 PROCEDURE — 1123F ACP DISCUSS/DSCN MKR DOCD: CPT | Performed by: OTOLARYNGOLOGY

## 2024-10-02 NOTE — PROGRESS NOTES
[facility] [place] [facility]  [facility] EAR, NOSE AND THROAT  [address]  [address]  [facility] [address]  Dept: [phone]  Dept Fax: [phone]  Loc: [phone]    Josef Hernandez is a 69 y.o. male who was referred by No ref. provider found for:  Chief Complaint   Patient presents with    Follow-up     Follow up for epistaxis       HPI:     Josef Hernandez is a 69 y.o. male with a long history of obstructive sleep apnea intolerant of CPAP despite trying innumerable mask types in the past.  He is a full-time  and periodically will try his CPAP again with his little success as he has in the past.  He believes he is truly sleeps better without it though he rarely wakes up rested.  He was prepared for septoplasty several months ago but was also scheduled for his polysomnogram so we opted to get that done ahead of time to see where he stood.  He currently has an AHI equivalent of greater than 40 and with his BMI of 31.1, he should be a candidate for the Inspire hypoglossal nerve stimulator.     From his HPI on 1/9/2024:  Josef Hernandez is a 68 y.o. male who presents for initial evaluation with a 2-year history of periodic nosebleeds last 1 of which was on December 5, 2023.  He states that they occur exclusively in the left nostril and exclusively following even gentle nose blowing.  He sees a small scab or clot come out and then the bleeding followed shortly after.  Sometimes it takes several hours to bethanie.  He has had several ER visits for this condition the last 1 of which required the placement of some form of nasal packing and the use of Afrin spray.  As a result he carries some Afrin with him anywhere he goes.  His wife says the same thing along with bringing small size nasal tampons whether that she think she will be able to put into his nose if the bleeding ever became overly severe.  He is positive that this is never occurred with straining to  something or

## 2024-10-02 NOTE — TELEPHONE ENCOUNTER
Dr Limon-    This patient had previous orders for a DISE, epistaxis control and a Septoplasty. These orders were held until he had a sleep visit and sleep study (which are now complete).    Can you verify if this is still the plan or will he also need any turbinate work and nasal valve repair?    If the plan is the same, I have those orders. If not, additional orders will need placed.    Thank you!

## 2024-11-19 ENCOUNTER — PREP FOR PROCEDURE (OUTPATIENT)
Dept: ENT CLINIC | Age: 69
End: 2024-11-19

## 2024-12-09 DIAGNOSIS — N40.0 BENIGN NON-NODULAR PROSTATIC HYPERPLASIA WITHOUT LOWER URINARY TRACT SYMPTOMS: ICD-10-CM

## 2024-12-10 ENCOUNTER — TELEPHONE (OUTPATIENT)
Dept: CARDIOLOGY CLINIC | Age: 69
End: 2024-12-10

## 2024-12-10 DIAGNOSIS — Z01.818 PRE-OP TESTING: ICD-10-CM

## 2024-12-10 DIAGNOSIS — I25.10 CORONARY ARTERY DISEASE INVOLVING NATIVE CORONARY ARTERY OF NATIVE HEART WITHOUT ANGINA PECTORIS: Primary | ICD-10-CM

## 2024-12-10 RX ORDER — SILODOSIN 4 MG/1
4 CAPSULE ORAL EVERY EVENING
Qty: 90 CAPSULE | Refills: 3 | Status: SHIPPED | OUTPATIENT
Start: 2024-12-10

## 2024-12-10 NOTE — TELEPHONE ENCOUNTER
Josef is scheduled for surgery with Dr Limon on 1/7/25. We are requesting a cardiac clearance.    Surgery:  DISE, Septoplasty, Inferior Turbinate reduction, Nasal valve repair with implant    Please advise.    Thank you!

## 2024-12-11 NOTE — TELEPHONE ENCOUNTER
Patient's wife Margy called to schedule testing, stating that patient leaves for [place] on 12/19/24 and will not return until 12/26/24, surgery is on 1/03/25. Kusum stress scheduled for 12/13/24 with arrival time of 7:00 am. All instructions reviewed with Margy and patient in the background, verbalized understanding. Echo scheduled for 12/18/24 at 4:00 pm, as not echo and kusum openings are available on the same day prior to patient departure.

## 2024-12-13 ENCOUNTER — LAB (OUTPATIENT)
Dept: LAB | Age: 69
End: 2024-12-13

## 2024-12-13 ENCOUNTER — HOSPITAL ENCOUNTER (OUTPATIENT)
Age: 69
Discharge: HOME | End: 2024-12-15
Attending: NUCLEAR MEDICINE
Payer: COMMERCIAL

## 2024-12-13 ENCOUNTER — HOSPITAL ENCOUNTER (OUTPATIENT)
Dept: NUCLEAR MEDICINE | Age: 69
Discharge: HOME | End: 2024-12-13
Attending: NUCLEAR MEDICINE
Payer: COMMERCIAL

## 2024-12-13 VITALS — BODY MASS INDEX: 31.39 KG/M2 | WEIGHT: 200 LBS | HEIGHT: 67 IN

## 2024-12-13 DIAGNOSIS — Z01.818 PRE-OP TESTING: ICD-10-CM

## 2024-12-13 DIAGNOSIS — I25.10 CORONARY ARTERY DISEASE INVOLVING NATIVE CORONARY ARTERY OF NATIVE HEART WITHOUT ANGINA PECTORIS: ICD-10-CM

## 2024-12-13 DIAGNOSIS — G47.33 SEVERE OBSTRUCTIVE SLEEP APNEA: ICD-10-CM

## 2024-12-13 DIAGNOSIS — J34.89 NASAL SEPTAL SPUR: ICD-10-CM

## 2024-12-13 LAB
ALBUMIN SERPL BCG-MCNC: 4.4 G/DL (ref 3.5–5.1)
ALP SERPL-CCNC: 58 U/L (ref 38–126)
ALT SERPL W/O P-5'-P-CCNC: 31 U/L (ref 11–66)
ANION GAP SERPL CALC-SCNC: 11 MEQ/L (ref 8–16)
AST SERPL-CCNC: 20 U/L (ref 5–40)
BASOPHILS ABSOLUTE: 0.1 THOU/MM3 (ref 0–0.1)
BASOPHILS NFR BLD AUTO: 1.2 %
BILIRUB SERPL-MCNC: 0.8 MG/DL (ref 0.3–1.2)
BUN SERPL-MCNC: 19 MG/DL (ref 7–22)
CALCIUM SERPL-MCNC: 9.8 MG/DL (ref 8.5–10.5)
CHLORIDE SERPL-SCNC: 105 MEQ/L (ref 98–111)
CO2 SERPL-SCNC: 26 MEQ/L (ref 23–33)
CREAT SERPL-MCNC: 1 MG/DL (ref 0.4–1.2)
DEPRECATED RDW RBC AUTO: 43.6 FL (ref 35–45)
ECHO AO ASC DIAM: 3.1 CM
ECHO AO ASCENDING AORTA INDEX: 1.53 CM/M2
ECHO AV CUSP MM: 1.9 CM
ECHO AV MEAN GRADIENT: 3 MMHG
ECHO AV MEAN VELOCITY: 0.8 M/S
ECHO AV PEAK GRADIENT: 6 MMHG
ECHO AV PEAK VELOCITY: 1.2 M/S
ECHO AV VELOCITY RATIO: 0.75
ECHO AV VTI: 25.8 CM
ECHO BSA: 2.07 M2
ECHO BSA: 2.07 M2
ECHO EST RA PRESSURE: 3 MMHG
ECHO LA AREA 2C: 15.6 CM2
ECHO LA AREA 4C: 14.7 CM2
ECHO LA DIAMETER INDEX: 1.88 CM/M2
ECHO LA DIAMETER: 3.8 CM
ECHO LA MAJOR AXIS: 4.9 CM
ECHO LA MINOR AXIS: 5.1 CM
ECHO LA VOL BP: 37 ML (ref 18–58)
ECHO LA VOL MOD A2C: 39 ML (ref 18–58)
ECHO LA VOL MOD A4C: 35 ML (ref 18–58)
ECHO LA VOL/BSA BIPLANE: 18 ML/M2 (ref 16–34)
ECHO LA VOLUME INDEX MOD A2C: 19 ML/M2 (ref 16–34)
ECHO LA VOLUME INDEX MOD A4C: 17 ML/M2 (ref 16–34)
ECHO LV E' LATERAL VELOCITY: 6.4 CM/S
ECHO LV E' SEPTAL VELOCITY: 6.2 CM/S
ECHO LV EDV A2C: 96 ML
ECHO LV EDV A4C: 99 ML
ECHO LV EDV INDEX A4C: 49 ML/M2
ECHO LV EDV NDEX A2C: 48 ML/M2
ECHO LV EJECTION FRACTION A2C: 56 %
ECHO LV EJECTION FRACTION A4C: 57 %
ECHO LV EJECTION FRACTION BIPLANE: 56 % (ref 55–100)
ECHO LV ESV A2C: 42 ML
ECHO LV ESV A4C: 43 ML
ECHO LV ESV INDEX A2C: 21 ML/M2
ECHO LV ESV INDEX A4C: 21 ML/M2
ECHO LV FRACTIONAL SHORTENING: 38 % (ref 28–44)
ECHO LV INTERNAL DIMENSION DIASTOLE INDEX: 2.48 CM/M2
ECHO LV INTERNAL DIMENSION DIASTOLIC: 5 CM (ref 4.2–5.9)
ECHO LV INTERNAL DIMENSION SYSTOLIC INDEX: 1.53 CM/M2
ECHO LV INTERNAL DIMENSION SYSTOLIC: 3.1 CM
ECHO LV ISOVOLUMETRIC RELAXATION TIME (IVRT): 74 MS
ECHO LV IVSD: 0.7 CM (ref 0.6–1)
ECHO LV MASS 2D: 125.1 G (ref 88–224)
ECHO LV MASS INDEX 2D: 61.9 G/M2 (ref 49–115)
ECHO LV POSTERIOR WALL DIASTOLIC: 0.8 CM (ref 0.6–1)
ECHO LV RELATIVE WALL THICKNESS RATIO: 0.32
ECHO LVOT AV VTI INDEX: 0.79
ECHO LVOT MEAN GRADIENT: 2 MMHG
ECHO LVOT PEAK GRADIENT: 3 MMHG
ECHO LVOT PEAK VELOCITY: 0.9 M/S
ECHO LVOT VTI: 20.3 CM
ECHO MV A VELOCITY: 0.88 M/S
ECHO MV E DECELERATION TIME (DT): 254 MS
ECHO MV E VELOCITY: 0.76 M/S
ECHO MV E/A RATIO: 0.86
ECHO MV E/E' LATERAL: 11.88
ECHO MV E/E' RATIO (AVERAGED): 12.07
ECHO MV E/E' SEPTAL: 12.26
ECHO PULMONARY ARTERY END DIASTOLIC PRESSURE: 7 MMHG
ECHO PV MAX VELOCITY: 0.5 M/S
ECHO PV PEAK GRADIENT: 1 MMHG
ECHO PV REGURGITANT MAX VELOCITY: 1.3 M/S
ECHO RIGHT VENTRICULAR SYSTOLIC PRESSURE (RVSP): 25 MMHG
ECHO RV INTERNAL DIMENSION: 2.7 CM
ECHO RV TAPSE: 2.3 CM (ref 1.7–?)
ECHO TV E WAVE: 0.5 M/S
ECHO TV REGURGITANT MAX VELOCITY: 2.36 M/S
ECHO TV REGURGITANT PEAK GRADIENT: 22 MMHG
EOSINOPHIL NFR BLD AUTO: 2.4 %
EOSINOPHILS ABSOLUTE: 0.1 THOU/MM3 (ref 0–0.4)
ERYTHROCYTE [DISTWIDTH] IN BLOOD BY AUTOMATED COUNT: 12.8 % (ref 11.5–14.5)
GFR SERPL CREATININE-BSD FRML MDRD: 80 ML/MIN/1.73M2
GLUCOSE SERPL-MCNC: 135 MG/DL (ref 70–108)
HCT VFR BLD AUTO: 47.5 % (ref 42–52)
HGB BLD-MCNC: 15.6 GM/DL (ref 14–18)
IMM GRANULOCYTES # BLD AUTO: 0.02 THOU/MM3 (ref 0–0.07)
IMM GRANULOCYTES NFR BLD AUTO: 0.3 %
LYMPHOCYTES ABSOLUTE: 1.5 THOU/MM3 (ref 1–4.8)
LYMPHOCYTES NFR BLD AUTO: 25.2 %
MCH RBC QN AUTO: 30.5 PG (ref 26–33)
MCHC RBC AUTO-ENTMCNC: 32.8 GM/DL (ref 32.2–35.5)
MCV RBC AUTO: 92.8 FL (ref 80–94)
MONOCYTES ABSOLUTE: 0.6 THOU/MM3 (ref 0.4–1.3)
MONOCYTES NFR BLD AUTO: 10.1 %
NEUTROPHILS ABSOLUTE: 3.5 THOU/MM3 (ref 1.8–7.7)
NEUTROPHILS NFR BLD AUTO: 60.8 %
NRBC BLD AUTO-RTO: 0 /100 WBC
NUC STRESS EJECTION FRACTION: 59 %
PLATELET # BLD AUTO: 194 THOU/MM3 (ref 130–400)
PMV BLD AUTO: 11.8 FL (ref 9.4–12.4)
POTASSIUM SERPL-SCNC: 4.4 MEQ/L (ref 3.5–5.2)
PROT SERPL-MCNC: 7.5 G/DL (ref 6.1–8)
RBC # BLD AUTO: 5.12 MILL/MM3 (ref 4.7–6.1)
SODIUM SERPL-SCNC: 142 MEQ/L (ref 135–145)
STRESS BASELINE DIAS BP: 82 MMHG
STRESS BASELINE HR: 67 BPM
STRESS BASELINE SYS BP: 155 MMHG
STRESS ESTIMATED WORKLOAD: 1 METS
STRESS PEAK DIAS BP: 82 MMHG
STRESS PEAK SYS BP: 155 MMHG
STRESS PERCENT HR ACHIEVED: 63 %
STRESS POST PEAK HR: 94 BPM
STRESS RATE PRESSURE PRODUCT: NORMAL BPM*MMHG
STRESS STAGE 1 BP: NORMAL MMHG
STRESS STAGE 1 DURATION: 1 MIN:SEC
STRESS STAGE 1 HR: 81 BPM
STRESS STAGE 2 BP: NORMAL MMHG
STRESS STAGE 2 DURATION: 1 MIN:SEC
STRESS STAGE 2 HR: 90 BPM
STRESS STAGE 3 BP: NORMAL MMHG
STRESS STAGE 3 DURATION: 1 MIN:SEC
STRESS STAGE 3 HR: 90 BPM
STRESS STAGE RECOVERY 1 BP: NORMAL MMHG
STRESS STAGE RECOVERY 1 DURATION: 1 MIN:SEC
STRESS STAGE RECOVERY 1 HR: 88 BPM
STRESS STAGE RECOVERY 2 BP: NORMAL MMHG
STRESS STAGE RECOVERY 2 DURATION: 1 MIN:SEC
STRESS STAGE RECOVERY 2 HR: 88 BPM
STRESS STAGE RECOVERY 3 BP: NORMAL MMHG
STRESS STAGE RECOVERY 3 DURATION: 1 MIN:SEC
STRESS STAGE RECOVERY 3 HR: 84 BPM
STRESS STAGE RECOVERY 4 BP: NORMAL MMHG
STRESS STAGE RECOVERY 4 DURATION: 2 MIN:SEC
STRESS STAGE RECOVERY 4 HR: 85 BPM
STRESS TARGET HR: 149 BPM
WBC # BLD AUTO: 5.8 THOU/MM3 (ref 4.8–10.8)

## 2024-12-13 PROCEDURE — 93017 CV STRESS TEST TRACING ONLY: CPT

## 2024-12-13 PROCEDURE — 3430000000 HC RX DIAGNOSTIC RADIOPHARMACEUTICAL: Performed by: NUCLEAR MEDICINE

## 2024-12-13 PROCEDURE — 93306 TTE W/DOPPLER COMPLETE: CPT

## 2024-12-13 PROCEDURE — A9500 TC99M SESTAMIBI: HCPCS | Performed by: NUCLEAR MEDICINE

## 2024-12-13 PROCEDURE — 78452 HT MUSCLE IMAGE SPECT MULT: CPT

## 2024-12-13 PROCEDURE — 6360000002 HC RX W HCPCS: Performed by: NUCLEAR MEDICINE

## 2024-12-13 PROCEDURE — 93306 TTE W/DOPPLER COMPLETE: CPT | Performed by: NUCLEAR MEDICINE

## 2024-12-13 RX ORDER — REGADENOSON 0.08 MG/ML
0.4 INJECTION, SOLUTION INTRAVENOUS
Status: COMPLETED | OUTPATIENT
Start: 2024-12-13 | End: 2024-12-13

## 2024-12-13 RX ORDER — TETRAKIS(2-METHOXYISOBUTYLISOCYANIDE)COPPER(I) TETRAFLUOROBORATE 1 MG/ML
33.8 INJECTION, POWDER, LYOPHILIZED, FOR SOLUTION INTRAVENOUS
Status: COMPLETED | OUTPATIENT
Start: 2024-12-13 | End: 2024-12-13

## 2024-12-13 RX ORDER — TETRAKIS(2-METHOXYISOBUTYLISOCYANIDE)COPPER(I) TETRAFLUOROBORATE 1 MG/ML
9.8 INJECTION, POWDER, LYOPHILIZED, FOR SOLUTION INTRAVENOUS
Status: COMPLETED | OUTPATIENT
Start: 2024-12-13 | End: 2024-12-13

## 2024-12-13 RX ADMIN — REGADENOSON 0.4 MG: 0.08 INJECTION, SOLUTION INTRAVENOUS at 08:11

## 2024-12-13 RX ADMIN — Medication 33.8 MILLICURIE: at 08:12

## 2024-12-13 RX ADMIN — Medication 9.8 MILLICURIE: at 07:20

## 2024-12-14 ENCOUNTER — HOSPITAL ENCOUNTER (OUTPATIENT)
Age: 69
Discharge: HOME | End: 2024-12-14
Payer: COMMERCIAL

## 2024-12-14 ENCOUNTER — HOSPITAL ENCOUNTER (OUTPATIENT)
Dept: GENERAL RADIOLOGY | Age: 69
Discharge: HOME | End: 2024-12-14
Payer: COMMERCIAL

## 2024-12-14 DIAGNOSIS — J34.89 NASAL SEPTAL SPUR: ICD-10-CM

## 2024-12-14 DIAGNOSIS — Z01.818 PRE-OP TESTING: ICD-10-CM

## 2024-12-14 DIAGNOSIS — G47.33 SEVERE OBSTRUCTIVE SLEEP APNEA: ICD-10-CM

## 2024-12-14 PROCEDURE — 71046 X-RAY EXAM CHEST 2 VIEWS: CPT

## 2024-12-27 NOTE — PROGRESS NOTES
Follow all instructions given by your physician  Do not eat or drink anything after midnight prior to surgery(includes water, chewing gum, mints and ice chips)  Sips of water am of surgery with allowed medications  May brush teeth   Do not smoke or chew tobacco, drink alcoholic beverages or use any illicit drugs for 24 hours prior to surgery  Bring insurance info and photo ID  Bring pertinent paperwork with you from Doctor or surgeons's office  Wear clean comfortable, loose-fitting clothing  No make-up, nail polish, jewelry, piercings, or contact lenses to be worn day of surgery  No glue on dentures morning of surgery; you will be asked to remove them for surgery. Case for glasses.  Shower the night before and the morning of surgery with cleansing soap provided or a liquid antibacterial soap, dry with new fresh clean towel after each shower, no lotions, creams or powder.  Clean sheets and pillowcase on bed night before surgery  Bring medications in original bottles, Bring rescue inhalers with you  Bring CPAP/BIPAP machine if you have one ( you may be charged if one is needed in recovery room ) No     Do you have a DNR?   Please Bring Healthcare Directive or Healthcare Power of  in so we can scan it into your chart.  Yes on file    Our pharmacy has a Meds to Beds program where they will deliver any new prescriptions you may have to your room before you leave. Our Pharmacy will clear it through your insurance; for example (same co pay). This enables you to take your new RX as soon as you need when you get home and avoids stop/wait delays on the way home.  Please have a form of payment with you and have someone designated as your Pharmacy contact with their phone # as you may not feel well or still be under the influence of anesthesia.    Please refer to the SSI-Surgical Site Infection Flyer you hopefully received in the mail-together we can prevent infections; signs and symptoms reviewed.  When discharged be

## 2025-01-03 ENCOUNTER — PREP FOR PROCEDURE (OUTPATIENT)
Dept: ENT CLINIC | Age: 70
End: 2025-01-03

## 2025-01-06 RX ORDER — SODIUM CHLORIDE 9 MG/ML
INJECTION, SOLUTION INTRAVENOUS PRN
Status: CANCELLED | OUTPATIENT
Start: 2025-01-06

## 2025-01-06 RX ORDER — SODIUM CHLORIDE 0.9 % (FLUSH) 0.9 %
5-40 SYRINGE (ML) INJECTION PRN
Status: CANCELLED | OUTPATIENT
Start: 2025-01-06

## 2025-01-06 RX ORDER — SODIUM CHLORIDE 0.9 % (FLUSH) 0.9 %
5-40 SYRINGE (ML) INJECTION EVERY 12 HOURS SCHEDULED
Status: CANCELLED | OUTPATIENT
Start: 2025-01-06

## 2025-01-07 ENCOUNTER — HOSPITAL ENCOUNTER (OUTPATIENT)
Age: 70
Setting detail: OUTPATIENT SURGERY
Discharge: HOME | End: 2025-01-07
Attending: OTOLARYNGOLOGY | Admitting: OTOLARYNGOLOGY
Payer: COMMERCIAL

## 2025-01-07 ENCOUNTER — ANESTHESIA EVENT (OUTPATIENT)
Dept: OPERATING ROOM | Age: 70
End: 2025-01-07
Payer: COMMERCIAL

## 2025-01-07 ENCOUNTER — ANESTHESIA (OUTPATIENT)
Dept: OPERATING ROOM | Age: 70
End: 2025-01-07
Payer: COMMERCIAL

## 2025-01-07 VITALS
HEART RATE: 74 BPM | DIASTOLIC BLOOD PRESSURE: 70 MMHG | WEIGHT: 192.6 LBS | TEMPERATURE: 97 F | OXYGEN SATURATION: 97 % | BODY MASS INDEX: 30.23 KG/M2 | HEIGHT: 67 IN | RESPIRATION RATE: 16 BRPM | SYSTOLIC BLOOD PRESSURE: 146 MMHG

## 2025-01-07 DIAGNOSIS — Z78.9 INTOLERANCE OF CONTINUOUS POSITIVE AIRWAY PRESSURE (CPAP) VENTILATION: ICD-10-CM

## 2025-01-07 DIAGNOSIS — R06.89 DYSPNEA AND RESPIRATORY ABNORMALITIES: ICD-10-CM

## 2025-01-07 DIAGNOSIS — R06.00 DYSPNEA AND RESPIRATORY ABNORMALITIES: ICD-10-CM

## 2025-01-07 DIAGNOSIS — G47.33 SEVERE OBSTRUCTIVE SLEEP APNEA: ICD-10-CM

## 2025-01-07 DIAGNOSIS — G89.18 ACUTE POSTOPERATIVE PAIN: Primary | ICD-10-CM

## 2025-01-07 DIAGNOSIS — G47.10 HYPERSOMNOLENCE: ICD-10-CM

## 2025-01-07 PROCEDURE — 6360000002 HC RX W HCPCS: Performed by: OTOLARYNGOLOGY

## 2025-01-07 PROCEDURE — 7100000001 HC PACU RECOVERY - ADDTL 15 MIN: Performed by: OTOLARYNGOLOGY

## 2025-01-07 PROCEDURE — 2720000010 HC SURG SUPPLY STERILE: Performed by: OTOLARYNGOLOGY

## 2025-01-07 PROCEDURE — 7100000000 HC PACU RECOVERY - FIRST 15 MIN: Performed by: OTOLARYNGOLOGY

## 2025-01-07 PROCEDURE — 6360000002 HC RX W HCPCS

## 2025-01-07 PROCEDURE — C1601 HC ENDOSCOPE, PULM, IMAGING/ILLUMINATION DEVICE: HCPCS | Performed by: OTOLARYNGOLOGY

## 2025-01-07 PROCEDURE — 88305 TISSUE EXAM BY PATHOLOGIST: CPT

## 2025-01-07 PROCEDURE — 2580000003 HC RX 258: Performed by: OTOLARYNGOLOGY

## 2025-01-07 PROCEDURE — 3600000014 HC SURGERY LEVEL 4 ADDTL 15MIN: Performed by: OTOLARYNGOLOGY

## 2025-01-07 PROCEDURE — 3700000001 HC ADD 15 MINUTES (ANESTHESIA): Performed by: OTOLARYNGOLOGY

## 2025-01-07 PROCEDURE — 2709999900 HC NON-CHARGEABLE SUPPLY: Performed by: OTOLARYNGOLOGY

## 2025-01-07 PROCEDURE — 3700000000 HC ANESTHESIA ATTENDED CARE: Performed by: OTOLARYNGOLOGY

## 2025-01-07 PROCEDURE — 7100000011 HC PHASE II RECOVERY - ADDTL 15 MIN: Performed by: OTOLARYNGOLOGY

## 2025-01-07 PROCEDURE — 7100000010 HC PHASE II RECOVERY - FIRST 15 MIN: Performed by: OTOLARYNGOLOGY

## 2025-01-07 PROCEDURE — 6360000002 HC RX W HCPCS: Performed by: ANESTHESIOLOGY

## 2025-01-07 PROCEDURE — 2500000003 HC RX 250 WO HCPCS: Performed by: REGISTERED NURSE

## 2025-01-07 PROCEDURE — 6360000002 HC RX W HCPCS: Performed by: REGISTERED NURSE

## 2025-01-07 PROCEDURE — 3600000004 HC SURGERY LEVEL 4 BASE: Performed by: OTOLARYNGOLOGY

## 2025-01-07 PROCEDURE — 88300 SURGICAL PATH GROSS: CPT

## 2025-01-07 RX ORDER — PROPOFOL 10 MG/ML
INJECTION, EMULSION INTRAVENOUS
Status: DISCONTINUED | OUTPATIENT
Start: 2025-01-07 | End: 2025-01-07 | Stop reason: SDUPTHER

## 2025-01-07 RX ORDER — NALOXONE HYDROCHLORIDE 0.4 MG/ML
INJECTION, SOLUTION INTRAMUSCULAR; INTRAVENOUS; SUBCUTANEOUS PRN
Status: DISCONTINUED | OUTPATIENT
Start: 2025-01-07 | End: 2025-01-07 | Stop reason: HOSPADM

## 2025-01-07 RX ORDER — SODIUM CHLORIDE 0.9 % (FLUSH) 0.9 %
5-40 SYRINGE (ML) INJECTION EVERY 12 HOURS SCHEDULED
Status: DISCONTINUED | OUTPATIENT
Start: 2025-01-07 | End: 2025-01-07 | Stop reason: HOSPADM

## 2025-01-07 RX ORDER — LABETALOL HYDROCHLORIDE 5 MG/ML
10 INJECTION, SOLUTION INTRAVENOUS
Status: DISCONTINUED | OUTPATIENT
Start: 2025-01-07 | End: 2025-01-07 | Stop reason: HOSPADM

## 2025-01-07 RX ORDER — SODIUM CHLORIDE 9 MG/ML
INJECTION, SOLUTION INTRAVENOUS PRN
Status: DISCONTINUED | OUTPATIENT
Start: 2025-01-07 | End: 2025-01-07 | Stop reason: HOSPADM

## 2025-01-07 RX ORDER — SODIUM CHLORIDE 0.9 % (FLUSH) 0.9 %
5-40 SYRINGE (ML) INJECTION PRN
Status: DISCONTINUED | OUTPATIENT
Start: 2025-01-07 | End: 2025-01-07 | Stop reason: HOSPADM

## 2025-01-07 RX ORDER — MIDAZOLAM HYDROCHLORIDE 1 MG/ML
INJECTION, SOLUTION INTRAMUSCULAR; INTRAVENOUS
Status: DISCONTINUED | OUTPATIENT
Start: 2025-01-07 | End: 2025-01-07 | Stop reason: SDUPTHER

## 2025-01-07 RX ORDER — ONDANSETRON 2 MG/ML
4 INJECTION INTRAMUSCULAR; INTRAVENOUS
Status: DISCONTINUED | OUTPATIENT
Start: 2025-01-07 | End: 2025-01-07 | Stop reason: HOSPADM

## 2025-01-07 RX ORDER — FENTANYL CITRATE 50 UG/ML
INJECTION, SOLUTION INTRAMUSCULAR; INTRAVENOUS
Status: DISCONTINUED | OUTPATIENT
Start: 2025-01-07 | End: 2025-01-07 | Stop reason: SDUPTHER

## 2025-01-07 RX ORDER — CIPROFLOXACIN 500 MG/1
500 TABLET, FILM COATED ORAL 2 TIMES DAILY
Qty: 20 TABLET | Refills: 0 | Status: SHIPPED | OUTPATIENT
Start: 2025-01-07 | End: 2025-01-17

## 2025-01-07 RX ORDER — OXYCODONE AND ACETAMINOPHEN 5; 325 MG/1; MG/1
1 TABLET ORAL EVERY 6 HOURS PRN
Qty: 20 TABLET | Refills: 0 | Status: SHIPPED | OUTPATIENT
Start: 2025-01-07 | End: 2025-01-12

## 2025-01-07 RX ORDER — HYDRALAZINE HYDROCHLORIDE 20 MG/ML
10 INJECTION INTRAMUSCULAR; INTRAVENOUS
Status: DISCONTINUED | OUTPATIENT
Start: 2025-01-07 | End: 2025-01-07 | Stop reason: HOSPADM

## 2025-01-07 RX ORDER — ROCURONIUM BROMIDE 10 MG/ML
INJECTION, SOLUTION INTRAVENOUS
Status: DISCONTINUED | OUTPATIENT
Start: 2025-01-07 | End: 2025-01-07 | Stop reason: SDUPTHER

## 2025-01-07 RX ORDER — DEXAMETHASONE SODIUM PHOSPHATE 10 MG/ML
10 INJECTION, EMULSION INTRAMUSCULAR; INTRAVENOUS ONCE
Status: COMPLETED | OUTPATIENT
Start: 2025-01-07 | End: 2025-01-07

## 2025-01-07 RX ORDER — ONDANSETRON 2 MG/ML
INJECTION INTRAMUSCULAR; INTRAVENOUS
Status: DISCONTINUED | OUTPATIENT
Start: 2025-01-07 | End: 2025-01-07 | Stop reason: SDUPTHER

## 2025-01-07 RX ORDER — TRANEXAMIC ACID 100 MG/ML
INJECTION, SOLUTION INTRAVENOUS
Status: DISCONTINUED | OUTPATIENT
Start: 2025-01-07 | End: 2025-01-07 | Stop reason: SDUPTHER

## 2025-01-07 RX ORDER — POLYMYXIN B SULFATE AND TRIMETHOPRIM 1; 10000 MG/ML; [USP'U]/ML
1 SOLUTION OPHTHALMIC
Status: DISCONTINUED | OUTPATIENT
Start: 2025-01-07 | End: 2025-01-07 | Stop reason: HOSPADM

## 2025-01-07 RX ADMIN — MIDAZOLAM 2 MG: 1 INJECTION INTRAMUSCULAR; INTRAVENOUS at 13:00

## 2025-01-07 RX ADMIN — PIPERACILLIN SODIUM AND TAZOBACTAM SODIUM 3375 MG: 3; .375 INJECTION, POWDER, LYOPHILIZED, FOR SOLUTION INTRAVENOUS at 12:51

## 2025-01-07 RX ADMIN — SUGAMMADEX 200 MG: 100 INJECTION, SOLUTION INTRAVENOUS at 15:31

## 2025-01-07 RX ADMIN — HYDROMORPHONE HYDROCHLORIDE 0.25 MG: 1 INJECTION, SOLUTION INTRAMUSCULAR; INTRAVENOUS; SUBCUTANEOUS at 16:10

## 2025-01-07 RX ADMIN — SODIUM CHLORIDE: 9 INJECTION, SOLUTION INTRAVENOUS at 14:14

## 2025-01-07 RX ADMIN — ROCURONIUM BROMIDE 50 MG: 10 INJECTION INTRAVENOUS at 13:00

## 2025-01-07 RX ADMIN — FENTANYL CITRATE 50 MCG: 50 INJECTION, SOLUTION INTRAMUSCULAR; INTRAVENOUS at 14:43

## 2025-01-07 RX ADMIN — SODIUM CHLORIDE: 9 INJECTION, SOLUTION INTRAVENOUS at 11:17

## 2025-01-07 RX ADMIN — PROPOFOL 130 MG: 10 INJECTION, EMULSION INTRAVENOUS at 13:00

## 2025-01-07 RX ADMIN — ONDANSETRON 4 MG: 2 INJECTION INTRAMUSCULAR; INTRAVENOUS at 15:31

## 2025-01-07 RX ADMIN — ROCURONIUM BROMIDE 20 MG: 10 INJECTION INTRAVENOUS at 14:20

## 2025-01-07 RX ADMIN — PROPOFOL 100 MCG/KG/MIN: 10 INJECTION, EMULSION INTRAVENOUS at 12:51

## 2025-01-07 RX ADMIN — TRANEXAMIC ACID 1000 MG: 100 INJECTION, SOLUTION INTRAVENOUS at 15:27

## 2025-01-07 RX ADMIN — PIPERACILLIN SODIUM AND TAZOBACTAM SODIUM 3375 MG: 3; .375 INJECTION, POWDER, LYOPHILIZED, FOR SOLUTION INTRAVENOUS at 14:51

## 2025-01-07 RX ADMIN — HYDROMORPHONE HYDROCHLORIDE 0.25 MG: 1 INJECTION, SOLUTION INTRAMUSCULAR; INTRAVENOUS; SUBCUTANEOUS at 16:15

## 2025-01-07 RX ADMIN — FENTANYL CITRATE 50 MCG: 50 INJECTION, SOLUTION INTRAMUSCULAR; INTRAVENOUS at 13:28

## 2025-01-07 RX ADMIN — DEXAMETHASONE SODIUM PHOSPHATE 10 MG: 10 INJECTION, EMULSION INTRAMUSCULAR; INTRAVENOUS at 11:18

## 2025-01-07 ASSESSMENT — PAIN - FUNCTIONAL ASSESSMENT
PAIN_FUNCTIONAL_ASSESSMENT: 0-10

## 2025-01-07 ASSESSMENT — PAIN DESCRIPTION - DESCRIPTORS
DESCRIPTORS: ACHING
DESCRIPTORS: BURNING

## 2025-01-07 ASSESSMENT — PAIN SCALES - GENERAL
PAINLEVEL_OUTOF10: 1
PAINLEVEL_OUTOF10: 6

## 2025-01-07 ASSESSMENT — PAIN DESCRIPTION - PAIN TYPE: TYPE: SURGICAL PAIN

## 2025-01-07 ASSESSMENT — PAIN DESCRIPTION - LOCATION: LOCATION: NOSE

## 2025-01-07 ASSESSMENT — PAIN DESCRIPTION - FREQUENCY: FREQUENCY: CONTINUOUS

## 2025-01-07 NOTE — PROGRESS NOTES
Pt returned to [facility] room 10. Vitals and assessment as charted. Pt has sherbet and water. Family at the bedside. Pt and family verbalized understanding of discharge criteria and call light use. Call light in reach.

## 2025-01-07 NOTE — ANESTHESIA PRE PROCEDURE
Department of Anesthesiology  Preprocedure Note       Name:  Josef Hernandez   Age:  69 y.o.  :  1955                                          MRN:  [id number]         Date:  2025      Surgeon: Surgeon(s):  Vasu Limon MD    Procedure: Procedure(s):  SEPTOPLASTY, INFERIOR TURBINATE REDUCTION NASAL VALVE COLLAPSE REPAIR WITH IMPLANT  DRUG INDUCED SLEEP ENDOSCOPY    Medications prior to admission:   Prior to Admission medications    Medication Sig Start Date End Date Taking? Authorizing Provider   silodosin (RAPAFLO) 4 MG CAPS capsule TAKE 1 CAPSULE BY MOUTH EVERY EVENING 12/10/24  Yes Owen Livingston DO   rosuvastatin (CRESTOR) 5 MG tablet TAKE 1 TABLET BY MOUTH EVERY NIGHT 24  Yes Owen Livingston DO   carvedilol (COREG) 6.25 MG tablet TAKE 1 TABLET BY MOUTH TWICE DAILY 24  Yes Paul Lopez MD   irbesartan (AVAPRO) 300 MG tablet TAKE 1 TABLET BY MOUTH DAILY 24  Yes Mortimer, Connor, PA-C   Coenzyme Q10 (CO Q 10 PO) Take 1 each by mouth daily   Yes Bonnie Sutherland MD   finasteride (PROSCAR) 5 MG tablet TAKE 1 TABLET BY MOUTH EVERY DAY 22  Yes Keyanna Lindquist PA-C   Cyanocobalamin (B-12 PO) Take 3,000 mg by mouth    Yes Bonine Sutherland MD   Multiple Vitamins-Minerals (THERAPEUTIC MULTIVITAMIN-MINERALS) tablet Take 1 tablet by mouth daily   Yes Bonnie Sutherland MD   aspirin 81 MG chewable tablet Take 1 tablet by mouth daily   Yes Bonnie Sutherland MD   Misc. Devices (CPAP MACHINE) MISC by Does not apply route Please change his current CPAP/BiPAP pressure to a CPAP 12 cm H2O    Please pull download in 2 weeks  Patient not taking: Reported on 2024   Van Tom, APRN - CNP       Current medications:    Current Facility-Administered Medications   Medication Dose Route Frequency Provider Last Rate Last Admin   • piperacillin-tazobactam (ZOSYN) 3,375 mg in sodium chloride 0.9 % 50 mL IVPB (mini-bag)  3,375 mg IntraVENous

## 2025-01-07 NOTE — PROGRESS NOTES

## 2025-01-07 NOTE — PROGRESS NOTES
1541 non reactive on arrival to PACi with spontaneous resp , oral airway and simple mask   1552 starting to react , oral airway removed and o2 off   1556 awake and oriented c/o # 6 - 7 nasal pain but unable to stay awake during conversation   1610 awakens on own states pain remains a # 6- 7 medicated with Dilaudid 0.25 mg IV   1615 pain unchanged , medicated with Dilaudid 0.25 mg IV   1620 eyes closed resp easy   1635 Pt awakens easily to name , states pain tolerable and drifts back to sleep   1640 Meets criteria for discharge , transported to [facility]

## 2025-01-07 NOTE — H&P
HISTORY AND PHYSICAL             Date: 1/6/2025        Patient Name: Josef Hernandez     YOB: 1955      Age:  69 y.o.    Chief Complaint   No chief complaint on file.     ”Severe obstructive sleep apnea refractory to CPAP”    History Obtained From   patient    History of Present Illness   The patient is a 69-year-old male with a long history of sleep apnea diagnosis and almost as long history of being unable to tolerate CPAP.  He continues to try it on and off with little or no success.  He is firmly convinced that he sleeps better without CPAP than he does with it.  He comes to the operating today for a drug-induced sleep endoscopy procedure to determine the nature of his obstructive sleep apnea tendency and the first stage of his surgical treatment to facilitate transnasal respiration even on recumbency.  The patient is also having problems with regular nosebleeds from his left nasal airway and would like me to look for the potential source of his bleeds that may be able to be cauterized so that they would stop occurring as they have.    The attached note below is of his most recent office visit which contains additional information.    Past Medical History     Past Medical History:   Diagnosis Date    Arthritis     CAD (coronary artery disease) 2008    x3 stents, MI    Hyperlipidemia     Hypertension     MI, old         Past Surgical History     Past Surgical History:   Procedure Laterality Date    COLONOSCOPY      CORONARY ANGIOPLASTY WITH STENT PLACEMENT  2008    DIAGNOSTIC CARDIAC CATH LAB PROCEDURE  02/2020    JOINT REPLACEMENT Left 03/05/2020    partial knee replacement        Medications Prior to Admission     Prior to Admission medications    Medication Sig Start Date End Date Taking? Authorizing Provider   silodosin (RAPAFLO) 4 MG CAPS capsule TAKE 1 CAPSULE BY MOUTH EVERY EVENING 12/10/24  Yes Owen Livingston, DO   rosuvastatin (CRESTOR) 5 MG tablet TAKE 1 TABLET BY MOUTH EVERY NIGHT

## 2025-01-07 NOTE — PROGRESS NOTES
Pt admitted to [facility] room 10 and oriented to unit. SCD sleeves applied. Nares swabbed. Pt verbalized permission for first name, last initial and physicians name on white board. SDS board and discharge criteria explained, pt and family verbalized understanding. Pt denies thoughts of harming self or others. Call light in reach. Family at the bedside.  [place] [phone]

## 2025-01-07 NOTE — DISCHARGE INSTRUCTIONS
Specific instructions for Maulik Hernandez from Dr. Vasu Limon:    1.  Rest; sleep propped up or on an easy chair for the next 3-4 nights until you can tolerate lying flat without extra pain or bloody oozing from your nose  2.  Do not blow your nose until your nasal septal splints are removed and you were given permission by me. You can use saline spray that you can squirt into the 2 rubber channels on either side of the splints to help keep them open and allow you to breathe.  3.  Apply antibiotic ointment to both sides of your nose and particularly well into the side that was incised (usually the more tender of the 2 sides) 3 times a day until the splints are removed and probably for several days afterwards depending on how things are healing.  4.  Use gauze drip pads under your nostrils to catch any draining blood and change them twice a day and as needed for saturation  5.  If you begin to bleed relatively briskly, lean forward and put an ice pack on your forehead and upper nose to relieve cool the bones of the upper face and had.  This may bring the bleeding to a halt.  6.  If you begin to bleed briskly or if the bleeding does not respond to an ice pack on your forehead as above, come to the emergency department and be evaluated.  If you are still bleeding they will call me or my colleague on-call to help you.  Do not swallow the blood but instead allow it to drip into a bowl or the kidney basin you got from the holding room you were in today.    For emergencies:  Vasu Limon MD  Cell: [phone]

## 2025-01-07 NOTE — OP NOTE
before turning the patient back to anesthesia for reversal extubation in the operating room.  This was carried out without incident and the patient was taken to recovery in satisfactory condition.     Estimated blood loss in the case was approximately 100 cc and the patient received approximately 1.5 L of intravenous lactated Ringer's intraoperatively.  No blood products were transfused.  No intraoperative complications were detected.  Counts were considered accurate x 3.     I was present for and participated in the patient's entire operation.      Electronically signed by Vasu Limon MD on 1/7/2025 at 4:15 PM

## 2025-01-08 NOTE — ANESTHESIA POSTPROCEDURE EVALUATION
Department of Anesthesiology  Postprocedure Note    Patient: Josef Hernandez  MRN: [id number]  YOB: 1955  Date of evaluation: 1/8/2025    Procedure Summary       Date: 01/07/25 Room / Location: [facility] OR 01 / [facility] OR    Anesthesia Start: 1247 Anesthesia Stop: 1540    Procedures:       SEPTOPLASTY, INFERIOR TURBINATE REDUCTION NASAL VALVE COLLAPSE REPAIR WITH IMPLANT (Nose)      DRUG INDUCED SLEEP ENDOSCOPY Diagnosis:       Hypersomnolence      Severe obstructive sleep apnea      Dyspnea and respiratory abnormalities      Intolerance of continuous positive airway pressure (CPAP) ventilation      (Hypersomnolence [G47.10])      (Severe obstructive sleep apnea [G47.33])      (Dyspnea and respiratory abnormalities [R06.00, R06.89])      (Intolerance of continuous positive airway pressure (CPAP) ventilation [Z78.9])    Surgeons: Vasu Limon MD Responsible Provider: Samuel Mcgowan MD    Anesthesia Type: general ASA Status: 3            Anesthesia Type: No value filed.    Alida Phase I: Alida Score: 10    Alida Phase II: Alida Score: 10    Anesthesia Post Evaluation    Patient location during evaluation: PACU  Patient participation: complete - patient participated  Level of consciousness: awake and alert  Airway patency: patent  Nausea & Vomiting: no nausea  Cardiovascular status: blood pressure returned to baseline and hemodynamically stable  Respiratory status: acceptable and spontaneous ventilation  Hydration status: euvolemic  Pain management: adequate    No notable events documented.

## 2025-01-09 ENCOUNTER — PATIENT MESSAGE (OUTPATIENT)
Dept: ENT CLINIC | Age: 70
End: 2025-01-09

## 2025-01-09 DIAGNOSIS — S05.8X9A: Primary | ICD-10-CM

## 2025-01-09 NOTE — TELEPHONE ENCOUNTER
Would recommend continuing with the warm compress over the area; if he has an established ophthalmologist would be beneficial to see if they can take a look and see if there is any modifications to the eye drops that would be best for him; especially if he isn't seeing much clinical improvement in the last two days.

## 2025-01-10 NOTE — TELEPHONE ENCOUNTER
Thank you for placing the referral. Amada SWANSON at  has the referral and is aware of where to fax the referral.

## 2025-01-15 ENCOUNTER — PREP FOR PROCEDURE (OUTPATIENT)
Dept: ENT CLINIC | Age: 70
End: 2025-01-15

## 2025-01-15 ENCOUNTER — OFFICE VISIT (OUTPATIENT)
Dept: ENT CLINIC | Age: 70
End: 2025-01-15

## 2025-01-15 VITALS
WEIGHT: 191.9 LBS | TEMPERATURE: 98.2 F | HEART RATE: 46 BPM | OXYGEN SATURATION: 100 % | BODY MASS INDEX: 30.12 KG/M2 | SYSTOLIC BLOOD PRESSURE: 124 MMHG | DIASTOLIC BLOOD PRESSURE: 76 MMHG | HEIGHT: 67 IN

## 2025-01-15 DIAGNOSIS — G47.33 OBSTRUCTIVE SLEEP APNEA: ICD-10-CM

## 2025-01-15 DIAGNOSIS — Z78.9 INTOLERANCE OF CONTINUOUS POSITIVE AIRWAY PRESSURE (CPAP) VENTILATION: ICD-10-CM

## 2025-01-15 DIAGNOSIS — Z01.818 PRE-OP TESTING: Primary | ICD-10-CM

## 2025-01-15 DIAGNOSIS — S05.8X9A: ICD-10-CM

## 2025-01-15 DIAGNOSIS — Z98.890 STATUS POST NASAL SEPTOPLASTY: ICD-10-CM

## 2025-01-15 DIAGNOSIS — J34.89 NASAL OBSTRUCTION: Primary | ICD-10-CM

## 2025-01-15 DIAGNOSIS — G47.33 SEVERE OBSTRUCTIVE SLEEP APNEA: ICD-10-CM

## 2025-01-15 RX ORDER — SOD CHLOR,BICARB/SQUEEZ BOTTLE
1 PACKET, WITH RINSE DEVICE NASAL DAILY
Qty: 1 KIT | Refills: 0 | Status: SHIPPED | OUTPATIENT
Start: 2025-01-15

## 2025-01-15 NOTE — PROGRESS NOTES
perform nasal irrigation twice daily until his next appointment. Additionally, he was directed to apply ointment to both nostrils 3 times daily to prevent crusting. He was also advised to avoid using saline spray.    2. Sleep apnea.  He was informed that if his sleep apnea symptoms persist, including snoring, gasping, and lack of dreaming, the Inspire device would be considered. The procedure for the Inspire device was explained, including the expected duration and recovery period. He was advised that the nasal care might not completely reverse his sleep apnea, and the Inspire device could be a necessary intervention.    PROCEDURE  Nasal splints were successfully removed during this visit.       Return in about 2 weeks (around 1/29/2025) for Nasal endoscopy with debridement and to plan further care as indicated.         The patient (or guardian, if applicable) and other individuals in attendance with the patient were advised that Artificial Intelligence will be utilized during this visit to record, process the conversation to generate a clinical note, and support improvement of the AI technology. The patient (or guardian, if applicable) and other individuals in attendance at the appointment consented to the use of AI, including the recording.         **This report has been created using voice recognition software. It may contain minor errors which are inherent in voice recognition technology.**

## 2025-01-17 ENCOUNTER — LAB (OUTPATIENT)
Dept: LAB | Age: 70
End: 2025-01-17

## 2025-01-17 DIAGNOSIS — G47.33 SEVERE OBSTRUCTIVE SLEEP APNEA: ICD-10-CM

## 2025-01-17 DIAGNOSIS — Z78.9 INTOLERANCE OF CONTINUOUS POSITIVE AIRWAY PRESSURE (CPAP) VENTILATION: ICD-10-CM

## 2025-01-17 DIAGNOSIS — Z01.818 PRE-OP TESTING: ICD-10-CM

## 2025-01-17 LAB
ALBUMIN SERPL BCG-MCNC: 4 G/DL (ref 3.5–5.1)
ALP SERPL-CCNC: 54 U/L (ref 38–126)
ALT SERPL W/O P-5'-P-CCNC: 24 U/L (ref 11–66)
ANION GAP SERPL CALC-SCNC: 11 MEQ/L (ref 8–16)
AST SERPL-CCNC: 14 U/L (ref 5–40)
BASOPHILS ABSOLUTE: 0.1 THOU/MM3 (ref 0–0.1)
BASOPHILS NFR BLD AUTO: 1.2 %
BILIRUB SERPL-MCNC: 0.5 MG/DL (ref 0.3–1.2)
BUN SERPL-MCNC: 18 MG/DL (ref 7–22)
CALCIUM SERPL-MCNC: 9.4 MG/DL (ref 8.5–10.5)
CHLORIDE SERPL-SCNC: 102 MEQ/L (ref 98–111)
CO2 SERPL-SCNC: 26 MEQ/L (ref 23–33)
CREAT SERPL-MCNC: 1.1 MG/DL (ref 0.4–1.2)
DEPRECATED RDW RBC AUTO: 43.8 FL (ref 35–45)
EOSINOPHIL NFR BLD AUTO: 2.6 %
EOSINOPHILS ABSOLUTE: 0.2 THOU/MM3 (ref 0–0.4)
ERYTHROCYTE [DISTWIDTH] IN BLOOD BY AUTOMATED COUNT: 12.8 % (ref 11.5–14.5)
GFR SERPL CREATININE-BSD FRML MDRD: 72 ML/MIN/1.73M2
GLUCOSE SERPL-MCNC: 109 MG/DL (ref 70–108)
HCT VFR BLD AUTO: 41.1 % (ref 42–52)
HGB BLD-MCNC: 13.9 GM/DL (ref 14–18)
IMM GRANULOCYTES # BLD AUTO: 0.04 THOU/MM3 (ref 0–0.07)
IMM GRANULOCYTES NFR BLD AUTO: 0.6 %
LYMPHOCYTES ABSOLUTE: 1.6 THOU/MM3 (ref 1–4.8)
LYMPHOCYTES NFR BLD AUTO: 23.5 %
MCH RBC QN AUTO: 31.2 PG (ref 26–33)
MCHC RBC AUTO-ENTMCNC: 33.8 GM/DL (ref 32.2–35.5)
MCV RBC AUTO: 92.4 FL (ref 80–94)
MONOCYTES ABSOLUTE: 0.8 THOU/MM3 (ref 0.4–1.3)
MONOCYTES NFR BLD AUTO: 11.2 %
NEUTROPHILS ABSOLUTE: 4.2 THOU/MM3 (ref 1.8–7.7)
NEUTROPHILS NFR BLD AUTO: 60.9 %
NRBC BLD AUTO-RTO: 0 /100 WBC
PLATELET # BLD AUTO: 193 THOU/MM3 (ref 130–400)
PMV BLD AUTO: 11.9 FL (ref 9.4–12.4)
POTASSIUM SERPL-SCNC: 4 MEQ/L (ref 3.5–5.2)
PROT SERPL-MCNC: 6.6 G/DL (ref 6.1–8)
RBC # BLD AUTO: 4.45 MILL/MM3 (ref 4.7–6.1)
SODIUM SERPL-SCNC: 139 MEQ/L (ref 135–145)
WBC # BLD AUTO: 6.9 THOU/MM3 (ref 4.8–10.8)

## 2025-01-28 ENCOUNTER — OFFICE VISIT (OUTPATIENT)
Dept: ENT CLINIC | Age: 70
End: 2025-01-28
Payer: COMMERCIAL

## 2025-01-28 VITALS
RESPIRATION RATE: 18 BRPM | OXYGEN SATURATION: 100 % | WEIGHT: 198 LBS | DIASTOLIC BLOOD PRESSURE: 80 MMHG | HEIGHT: 67 IN | BODY MASS INDEX: 31.08 KG/M2 | HEART RATE: 62 BPM | TEMPERATURE: 97.1 F | SYSTOLIC BLOOD PRESSURE: 138 MMHG

## 2025-01-28 DIAGNOSIS — G47.33 SEVERE OBSTRUCTIVE SLEEP APNEA: ICD-10-CM

## 2025-01-28 DIAGNOSIS — J34.89 NASAL OBSTRUCTION: ICD-10-CM

## 2025-01-28 DIAGNOSIS — G47.33 OBSTRUCTIVE SLEEP APNEA: ICD-10-CM

## 2025-01-28 DIAGNOSIS — Z98.890 STATUS POST NASAL SEPTOPLASTY: Primary | ICD-10-CM

## 2025-01-28 DIAGNOSIS — Z01.818 PRE-OP TESTING: Primary | ICD-10-CM

## 2025-01-28 DIAGNOSIS — Z78.9 INTOLERANCE OF CONTINUOUS POSITIVE AIRWAY PRESSURE (CPAP) VENTILATION: ICD-10-CM

## 2025-01-28 PROCEDURE — 31237 NSL/SINS NDSC SURG BX POLYPC: CPT | Performed by: OTOLARYNGOLOGY

## 2025-01-28 PROCEDURE — 99024 POSTOP FOLLOW-UP VISIT: CPT | Performed by: OTOLARYNGOLOGY

## 2025-01-30 ENCOUNTER — TELEPHONE (OUTPATIENT)
Dept: ENT CLINIC | Age: 70
End: 2025-01-30

## 2025-01-30 NOTE — TELEPHONE ENCOUNTER
Dr Freddie Stinson from [employer] said that Maulik is about 90% approved for inspire. He just needs Dr Limon' notes to reflect as much as possible the CPAP failure.    Can you please add this to your notes from his visit on Tuesday 1/28/25.    Thank you!

## 2025-02-04 ASSESSMENT — PATIENT HEALTH QUESTIONNAIRE - PHQ9
1. LITTLE INTEREST OR PLEASURE IN DOING THINGS: NOT AT ALL
SUM OF ALL RESPONSES TO PHQ QUESTIONS 1-9: 0
SUM OF ALL RESPONSES TO PHQ9 QUESTIONS 1 & 2: 0
2. FEELING DOWN, DEPRESSED OR HOPELESS: NOT AT ALL
SUM OF ALL RESPONSES TO PHQ QUESTIONS 1-9: 0
SUM OF ALL RESPONSES TO PHQ QUESTIONS 1-9: 0
SUM OF ALL RESPONSES TO PHQ9 QUESTIONS 1 & 2: 0
1. LITTLE INTEREST OR PLEASURE IN DOING THINGS: NOT AT ALL
2. FEELING DOWN, DEPRESSED OR HOPELESS: NOT AT ALL
SUM OF ALL RESPONSES TO PHQ QUESTIONS 1-9: 0

## 2025-02-04 NOTE — PROGRESS NOTES
is a pleasant alert cooperative well-appearing adult male in no acute distress.  His voice and speech pattern are abnormal for moderate hyponasality.  There is some crusting in the nose producing some degree of obstruction. The nasal cavity is otherwise healing well with no signs of cellulitis about either nostril.    Procedure: Nasal endoscopy with debridement; bilateral  Indication: The patient is status post septoplasty and turbinate reduction and warrants debridement to promote healthy healing that will reduce the risk of synechia formation and granuloma that may obstruct the nasal airway.  Findings: The patient's nasal airways were first sprayed with topical decongestant and then topical 4% lidocaine.  I performed her endoscopy with a 30 degree optical telescope and used a Thomas tip suction catheter as well as Blakesley forceps to remove soft tissue debris as described as follows: Both sides had a moderate amount of coagulum in the inferior meatus and the right side in particular had a lot posteriorly.  This was able to be removed with a combination of suction and Blakesley forceps.  The patient tolerated the procedure well.  Bleeding was self-limited.    Nasal debridement images:                                     Vitals reviewed.  Results         No results found.   Lab Results   Component Value Date/Time     01/17/2025 10:47 AM     12/13/2024 06:27 AM     02/06/2024 07:56 AM    K 4.0 01/17/2025 10:47 AM    K 4.4 12/13/2024 06:27 AM    K 4.2 02/06/2024 07:56 AM    K 4.4 02/27/2020 01:12 PM     01/17/2025 10:47 AM     12/13/2024 06:27 AM     02/06/2024 07:56 AM    CO2 26 01/17/2025 10:47 AM    CO2 26 12/13/2024 06:27 AM    CO2 27 02/06/2024 07:56 AM    BUN 18 01/17/2025 10:47 AM    BUN 19 12/13/2024 06:27 AM    BUN 21 02/06/2024 07:56 AM    CREATININE 1.1 01/17/2025 10:47 AM    CREATININE 1.0 12/13/2024 06:27 AM    CREATININE 1.2 02/06/2024 07:56 AM    CALCIUM 9.4

## 2025-02-07 ENCOUNTER — OFFICE VISIT (OUTPATIENT)
Dept: FAMILY MEDICINE CLINIC | Age: 70
End: 2025-02-07
Payer: COMMERCIAL

## 2025-02-07 VITALS
DIASTOLIC BLOOD PRESSURE: 70 MMHG | WEIGHT: 198.3 LBS | HEIGHT: 67 IN | HEART RATE: 68 BPM | SYSTOLIC BLOOD PRESSURE: 128 MMHG | BODY MASS INDEX: 31.12 KG/M2 | RESPIRATION RATE: 12 BRPM

## 2025-02-07 DIAGNOSIS — I25.10 CORONARY ARTERY DISEASE INVOLVING NATIVE CORONARY ARTERY OF NATIVE HEART WITHOUT ANGINA PECTORIS: ICD-10-CM

## 2025-02-07 DIAGNOSIS — R73.01 IFG (IMPAIRED FASTING GLUCOSE): ICD-10-CM

## 2025-02-07 DIAGNOSIS — Z00.00 WELL ADULT HEALTH CHECK: Primary | ICD-10-CM

## 2025-02-07 DIAGNOSIS — E78.00 PURE HYPERCHOLESTEROLEMIA: ICD-10-CM

## 2025-02-07 DIAGNOSIS — N40.0 BENIGN NON-NODULAR PROSTATIC HYPERPLASIA WITHOUT LOWER URINARY TRACT SYMPTOMS: ICD-10-CM

## 2025-02-07 PROCEDURE — 99397 PER PM REEVAL EST PAT 65+ YR: CPT | Performed by: FAMILY MEDICINE

## 2025-02-07 RX ORDER — SILODOSIN 4 MG/1
4 CAPSULE ORAL EVERY EVENING
Qty: 90 CAPSULE | Refills: 3 | Status: SHIPPED | OUTPATIENT
Start: 2025-02-07

## 2025-02-07 RX ORDER — ROSUVASTATIN CALCIUM 5 MG/1
5 TABLET, COATED ORAL NIGHTLY
Qty: 90 TABLET | Refills: 3 | Status: SHIPPED | OUTPATIENT
Start: 2025-02-07

## 2025-02-07 SDOH — ECONOMIC STABILITY: FOOD INSECURITY: WITHIN THE PAST 12 MONTHS, YOU WORRIED THAT YOUR FOOD WOULD RUN OUT BEFORE YOU GOT MONEY TO BUY MORE.: NEVER TRUE

## 2025-02-07 SDOH — ECONOMIC STABILITY: FOOD INSECURITY: WITHIN THE PAST 12 MONTHS, THE FOOD YOU BOUGHT JUST DIDN'T LAST AND YOU DIDN'T HAVE MONEY TO GET MORE.: NEVER TRUE

## 2025-02-07 ASSESSMENT — ENCOUNTER SYMPTOMS
GASTROINTESTINAL NEGATIVE: 1
RESPIRATORY NEGATIVE: 1

## 2025-02-07 NOTE — PROGRESS NOTES
Josef Hernandez (:  1955) is a 69 y.o. male,Established patient, here for evaluation of the following chief complaint(s):  Annual Exam and Medication Refill          Subjective   SUBJECTIVE/OBJECTIVE:  HPI  Chief Complaint   Patient presents with    Annual Exam    Medication Refill     Annual eval.    BP Readings from Last 3 Encounters:   25 128/70   25 138/80   01/15/25 124/76     Wt Readings from Last 3 Encounters:   25 89.9 kg (198 lb 4.8 oz)   25 89.8 kg (198 lb)   01/15/25 87 kg (191 lb 14.4 oz)       Patient Active Problem List   Diagnosis    CAD (coronary artery disease)    MI, old    BPH with obstruction/lower urinary tract symptoms    Abnormal stress test    Abrasion or friction burn of finger without infection    Elevated prostate specific antigen (PSA)    Epistaxis, recurrent    Nasal septal spur    Dyspnea and respiratory abnormalities    Snoring    Hypersomnolence    Pitting edema    Severe obstructive sleep apnea    Nasal obstruction    Chronic mouth breathing    Intolerance of continuous positive airway pressure (CPAP) ventilation    Nasal septal deviation    Hypertrophy of posterior end of inferior turbinate    Nasal valve collapse    Obstructive sleep apnea    Status post nasal septoplasty [Z98.890]       Current Outpatient Medications   Medication Sig Dispense Refill    rosuvastatin (CRESTOR) 5 MG tablet Take 1 tablet by mouth nightly 90 tablet 3    silodosin (RAPAFLO) 4 MG CAPS capsule Take 1 capsule by mouth every evening 90 capsule 3    Hypertonic Nasal Wash (SINUS RINSE) PACK 1 kit by Nasal route daily 1 kit 0    carvedilol (COREG) 6.25 MG tablet TAKE 1 TABLET BY MOUTH TWICE DAILY 180 tablet 3    irbesartan (AVAPRO) 300 MG tablet TAKE 1 TABLET BY MOUTH DAILY 90 tablet 3    Coenzyme Q10 (CO Q 10 PO) Take 1 each by mouth daily      finasteride (PROSCAR) 5 MG tablet TAKE 1 TABLET BY MOUTH EVERY DAY 90 tablet 3    Cyanocobalamin (B-12 PO) Take 3,000 mg by mouth

## 2025-02-10 RX ORDER — IRBESARTAN 300 MG/1
300 TABLET ORAL DAILY
Qty: 90 TABLET | Refills: 3 | Status: SHIPPED | OUTPATIENT
Start: 2025-02-10

## 2025-02-10 RX ORDER — CARVEDILOL 6.25 MG/1
6.25 TABLET ORAL 2 TIMES DAILY
Qty: 180 TABLET | Refills: 3 | Status: SHIPPED | OUTPATIENT
Start: 2025-02-10

## 2025-02-12 ENCOUNTER — HOSPITAL ENCOUNTER (OUTPATIENT)
Age: 70
Discharge: HOME | End: 2025-02-12
Payer: COMMERCIAL

## 2025-02-12 DIAGNOSIS — G47.33 OBSTRUCTIVE SLEEP APNEA: ICD-10-CM

## 2025-02-12 DIAGNOSIS — Z78.9 INTOLERANCE OF CONTINUOUS POSITIVE AIRWAY PRESSURE (CPAP) VENTILATION: ICD-10-CM

## 2025-02-12 DIAGNOSIS — Z01.818 PRE-OP TESTING: ICD-10-CM

## 2025-02-12 LAB
EKG ATRIAL RATE: 78 BPM
EKG P AXIS: -16 DEGREES
EKG P-R INTERVAL: 140 MS
EKG Q-T INTERVAL: 348 MS
EKG QRS DURATION: 68 MS
EKG QTC CALCULATION (BAZETT): 396 MS
EKG R AXIS: 7 DEGREES
EKG T AXIS: 21 DEGREES
EKG VENTRICULAR RATE: 78 BPM

## 2025-02-12 PROCEDURE — 93010 ELECTROCARDIOGRAM REPORT: CPT | Performed by: INTERNAL MEDICINE

## 2025-02-12 PROCEDURE — 93005 ELECTROCARDIOGRAM TRACING: CPT

## 2025-02-14 ENCOUNTER — LAB (OUTPATIENT)
Dept: LAB | Age: 70
End: 2025-02-14

## 2025-02-14 DIAGNOSIS — Z00.00 WELL ADULT HEALTH CHECK: ICD-10-CM

## 2025-02-14 LAB
ALBUMIN SERPL BCG-MCNC: 4.6 G/DL (ref 3.5–5.1)
ALP SERPL-CCNC: 61 U/L (ref 38–126)
ALT SERPL W/O P-5'-P-CCNC: 29 U/L (ref 11–66)
ANION GAP SERPL CALC-SCNC: 16 MEQ/L (ref 8–16)
AST SERPL-CCNC: 18 U/L (ref 5–40)
BASOPHILS ABSOLUTE: 0 THOU/MM3 (ref 0–0.1)
BASOPHILS NFR BLD AUTO: 0.7 %
BILIRUB SERPL-MCNC: 1 MG/DL (ref 0.3–1.2)
BUN SERPL-MCNC: 15 MG/DL (ref 7–22)
CALCIUM SERPL-MCNC: 10.2 MG/DL (ref 8.5–10.5)
CHLORIDE SERPL-SCNC: 96 MEQ/L (ref 98–111)
CHOLEST SERPL-MCNC: 153 MG/DL (ref 100–199)
CO2 SERPL-SCNC: 27 MEQ/L (ref 23–33)
CREAT SERPL-MCNC: 1 MG/DL (ref 0.4–1.2)
DEPRECATED MEAN GLUCOSE BLD GHB EST-ACNC: 117 MG/DL (ref 70–126)
DEPRECATED RDW RBC AUTO: 42.9 FL (ref 35–45)
EOSINOPHIL NFR BLD AUTO: 2.4 %
EOSINOPHILS ABSOLUTE: 0.2 THOU/MM3 (ref 0–0.4)
ERYTHROCYTE [DISTWIDTH] IN BLOOD BY AUTOMATED COUNT: 12.7 % (ref 11.5–14.5)
GFR SERPL CREATININE-BSD FRML MDRD: 80 ML/MIN/1.73M2
GLUCOSE SERPL-MCNC: 117 MG/DL (ref 70–108)
HBA1C MFR BLD HPLC: 5.9 % (ref 4.4–6.4)
HCT VFR BLD AUTO: 43.9 % (ref 42–52)
HDLC SERPL-MCNC: 63 MG/DL
HGB BLD-MCNC: 14.6 GM/DL (ref 14–18)
IMM GRANULOCYTES # BLD AUTO: 0.02 THOU/MM3 (ref 0–0.07)
IMM GRANULOCYTES NFR BLD AUTO: 0.3 %
LDLC SERPL CALC-MCNC: 73 MG/DL
LYMPHOCYTES ABSOLUTE: 1.5 THOU/MM3 (ref 1–4.8)
LYMPHOCYTES NFR BLD AUTO: 21.9 %
MCH RBC QN AUTO: 30.5 PG (ref 26–33)
MCHC RBC AUTO-ENTMCNC: 33.3 GM/DL (ref 32.2–35.5)
MCV RBC AUTO: 91.6 FL (ref 80–94)
MONOCYTES ABSOLUTE: 0.8 THOU/MM3 (ref 0.4–1.3)
MONOCYTES NFR BLD AUTO: 11.4 %
NEUTROPHILS ABSOLUTE: 4.4 THOU/MM3 (ref 1.8–7.7)
NEUTROPHILS NFR BLD AUTO: 63.3 %
NRBC BLD AUTO-RTO: 0 /100 WBC
PLATELET # BLD AUTO: 193 THOU/MM3 (ref 130–400)
PMV BLD AUTO: 11.3 FL (ref 9.4–12.4)
POTASSIUM SERPL-SCNC: 4.3 MEQ/L (ref 3.5–5.2)
PROT SERPL-MCNC: 7 G/DL (ref 6.1–8)
PSA SERPL-MCNC: 2.9 NG/ML (ref 0–1)
RBC # BLD AUTO: 4.79 MILL/MM3 (ref 4.7–6.1)
SODIUM SERPL-SCNC: 139 MEQ/L (ref 135–145)
TRIGL SERPL-MCNC: 85 MG/DL (ref 0–199)
TSH SERPL DL<=0.005 MIU/L-ACNC: 2.81 UIU/ML (ref 0.4–4.2)
WBC # BLD AUTO: 7 THOU/MM3 (ref 4.8–10.8)

## 2025-02-25 ENCOUNTER — OFFICE VISIT (OUTPATIENT)
Dept: ENT CLINIC | Age: 70
End: 2025-02-25

## 2025-02-25 VITALS
OXYGEN SATURATION: 97 % | WEIGHT: 203.1 LBS | HEART RATE: 57 BPM | TEMPERATURE: 97.7 F | HEIGHT: 67 IN | BODY MASS INDEX: 31.88 KG/M2 | DIASTOLIC BLOOD PRESSURE: 74 MMHG | SYSTOLIC BLOOD PRESSURE: 138 MMHG

## 2025-02-25 DIAGNOSIS — G47.33 SEVERE OBSTRUCTIVE SLEEP APNEA: Primary | ICD-10-CM

## 2025-02-25 DIAGNOSIS — Z78.9 INTOLERANCE OF CONTINUOUS POSITIVE AIRWAY PRESSURE (CPAP) VENTILATION: ICD-10-CM

## 2025-02-25 DIAGNOSIS — Z98.890 STATUS POST NASAL SEPTOPLASTY: ICD-10-CM

## 2025-02-25 PROCEDURE — 99024 POSTOP FOLLOW-UP VISIT: CPT | Performed by: OTOLARYNGOLOGY

## 2025-02-25 NOTE — PROGRESS NOTES
[facility] [place] [facility]  [facility] EAR, NOSE AND THROAT  [address]  [address]  [facility] [address]  Dept: [phone]  Dept Fax: [phone]  Loc: [phone]    Josef Hernandez is a 69 y.o. male who was referred by No ref. provider found for:  Chief Complaint   Patient presents with    Post-Op Check     Patient here for a post op DISE, septo, turbs, nasal valve. Patient wondering about when the tenderness will go away, right side tenderness on the inside of his nose.        HPI:       History of Present Illness  Josef Hernandez is a 69 y.o. male who returns today for a follow-up visit after undergoing septoplasty, turbinate reduction, and nasal valve repair to address the nasal component of his obstructive sleep apnea.    He has a long history of sleep apnea symptoms, with a formal diagnosis established approximately 9 months ago following his first sleep study. He reports experiencing fatigue for over a decade. Despite attempts to manage his condition with full face mask and nasal CPAP, he found them intolerable, often dislodging the mask during sleep. His average usage was around 3 hours per night, but this decreased over time until he ceased using it altogether. Currently, he is able to sleep through the night without the aid of CPAP, although he snores and has gasping spells as per common with untreated TYREE. His wife is unable to confirm the presence of gasping spells as he has been sleeping in a recliner for the past 2 years. He does not lie flat while sleeping because of his untreated TYREE. He reports satisfactory nasal breathing since his septoplasty but experiences tenderness upon palpation of his nostril. He has been performing nasal irrigation once or twice daily. He has undergone 2 cleanings to date.        History:     Allergies   Allergen Reactions    Azithromycin      hives    Pcn [Penicillins]      hives     Current Outpatient Medications   Medication Sig Dispense

## 2025-04-23 ENCOUNTER — COMMUNITY OUTREACH (OUTPATIENT)
Dept: FAMILY MEDICINE CLINIC | Age: 70
End: 2025-04-23

## 2025-04-28 NOTE — PROGRESS NOTES
In preparation for their surgical procedure above patient was screened for Obstructive Sleep Apnea (TYREE) using the STOP-Bang Questionnaire by the Pre-Admission Testing department.  This is a pre-surgical screening tool for patient safety and serves as a recommendation, this WILL NOT cause cancellation of surgery.    STOP-Bang Questionnaire  * Do you currently see a pulmonologist?  No     If yes STOP, do not complete.  Patient follows with Dr.     1.  Do you snore loudly (able to be heard in the next room)?      No    2.  Do you often feel tired or sleepy during the daytime?          No       3.  Has anyone ever told you that you stop breathing during your sleep?       No    4.  Do you have or are you being treated for high blood pressure?          Yes      5.  BMI more than 35?  BMI (Calculated): 31.4        No    6.  Age over 50 years? 69 y.o.      Yes    7.  Neck Circumference greater than 17 inches for male or 16 inches for female?  Measured           (visits only)            Not Applicable    8.  Gender Male?                 No      TOTAL SCORE: 2    TYREE - Low Risk : Yes to 0 - 2 questions  TYREE - Intermediate Risk : Yes to 3 - 4 questions  TYREE - High Risk : Yes to 5 - 8 questions    Adapted from:   STOP Questionnaire: A Tool to Screen Patients for Obstructive Sleep Apnea   ANNA Quick.R.C.P.C., Cortez Sow M.B.B.S., Lavern Robb M.D., Arabella Morfin, Ph.D., MARY JANE Dc.B.B.S., MARY JANE Lopez.Sc., Idania Muñoz M.D., Vincenzo Reese F.R.C.P.C.   Anesthesiology 2008; 108:812-21 Copyright 2008, the American Society of Anesthesiologists, Inc. [employer]   ----------------------------------------------------------------------------------------------------------------

## 2025-04-28 NOTE — PROGRESS NOTES
NPO after midnight (may have 8 oz of water up to 2 hours before surgery)  Bring insurance info and drivers license  Wear comfortable clean clothing  Do not bring jewelry  Shower night before and morning of surgery with a liquid antibacterial soap  Bring list of medications with dosage and how often taken  Follow all instructions given by your physician   needed at discharge  You must have a responsible adult with you day of surgery and for 24 hours after surgery  Call [phone] for any questions

## 2025-04-29 NOTE — PROGRESS NOTES
EKG 2/12/25 given to anesthesia for review. Per Dr. Alirio bob to proceed at the surgery center 5/5 without further intervention

## 2025-05-01 ENCOUNTER — PREP FOR PROCEDURE (OUTPATIENT)
Dept: ENT CLINIC | Age: 70
End: 2025-05-01

## 2025-05-02 RX ORDER — SODIUM CHLORIDE 9 MG/ML
INJECTION, SOLUTION INTRAVENOUS PRN
Status: CANCELLED | OUTPATIENT
Start: 2025-05-02

## 2025-05-02 RX ORDER — SODIUM CHLORIDE 0.9 % (FLUSH) 0.9 %
5-40 SYRINGE (ML) INJECTION EVERY 12 HOURS SCHEDULED
Status: CANCELLED | OUTPATIENT
Start: 2025-05-02

## 2025-05-02 RX ORDER — SODIUM CHLORIDE 0.9 % (FLUSH) 0.9 %
5-40 SYRINGE (ML) INJECTION PRN
Status: CANCELLED | OUTPATIENT
Start: 2025-05-02

## 2025-05-02 NOTE — DISCHARGE INSTRUCTIONS
DIET:    - Resume normal diet    HYGIENE:    - Please wait until 48 hours after surgery before getting incisions on neck, chest, and torso wet.    - In the first 48 hours after surgery, will likely need to take sponge baths.    - Do not scrub or wash over the incisions until you are cleared to do so by your surgeon, usually around 7-10 days.  There is skin glue on the surface allowing the skin layer to heal together without external stitches.    WOUND CARE:    - May place a clean bandage over incisions as needed.    - Take oral antibiotics as prescribed    - If skin around incision starts to get red (> 1cm), swollen, and/or more painful, please call the office    ACTIVITY:    - Try to avoid sleeping on the side of your surgery, to the extent possible.    - You may walk for exercise starting the day after surgery.    - For 2 weeks:    o Do not  anything greater than 5 pounds with the hand/arm that’s on the same side as the surgery. After 2 weeks, you may increase weight to 10 pounds.    o Consider performing neck rolls 10 clockwise and 10 counterclockwise 3x/day.    - For 4 weeks, no strenuous activity (running, jogging, lifting weights, gardening, sports) or until cleared by physician.    PAIN MEDICATIONS:    - You will be prescribed Norco for pain.    - If pain is not severe, consider taking Tylenol 650mg every 6 hours    - Hold aspirin for 5 days after surgery, then may resume    - Avoid direct heat (such as heating pads) to incision sites.    - May gently place ice over surgery sites as needed. Please place a thin clean towel over skin first and then place ice bag over towel. Ice for 10 minutes at a time only.    POST-OPERATIVE CLINIC APPOINTMENTS:    - 1 week:  wound check in the office.    - 1 month: device activation and wound check in the office.    - 2.5 months: check in visit to assess usage.    - 3 to 4 months: titration sleep study based on usage of >4 hrs/night.    - 4 months: final wound check in

## 2025-05-02 NOTE — H&P
Adapted from prior ENT note:    Obstructive sleep apnea  BMI 31.20  HST 3/22/2024 AHI 42.2  S/p nasal septoplasty/turbinate reduction 1/7/2025  No new symptoms    Past Medical History:   Diagnosis Date    Arthritis     CAD (coronary artery disease) 2008    x3 stents, MI    Hyperlipidemia     Hypertension     MI, old     Sleep apnea     not able to wear CPAP       Past Surgical History:   Procedure Laterality Date    COLONOSCOPY      CORONARY ANGIOPLASTY WITH STENT PLACEMENT  2008    DIAGNOSTIC CARDIAC CATH LAB PROCEDURE  02/2020    EXAMINATION UNDER ANESTHESIA N/A 1/7/2025    DRUG INDUCED SLEEP ENDOSCOPY performed by Vasu Limon MD at [facility] OR    JOINT REPLACEMENT Left 03/05/2020    partial knee replacement    NASAL SURG PROC UNLISTED N/A 1/7/2025    SEPTOPLASTY, INFERIOR TURBINATE REDUCTION NASAL VALVE COLLAPSE REPAIR WITH IMPLANT performed by Vasu Limon MD at [facility] OR       Allergies   Allergen Reactions    Azithromycin      hives    Pcn [Penicillins]      hives       Current Facility-Administered Medications   Medication Dose Route Frequency Provider Last Rate Last Admin    ceFAZolin (ANCEF) 2,000 mg in sterile water 20 mL IV syringe  2,000 mg IntraVENous Once Renée Graham APRN - CNP        sodium chloride flush 0.9 % injection 5-40 mL  5-40 mL IntraVENous 2 times per day Vasu Limon MD        sodium chloride flush 0.9 % injection 5-40 mL  5-40 mL IntraVENous PRN Vasu Limon MD        0.9 % sodium chloride infusion   IntraVENous PRN Vasu Limon MD           Current vitals  BP (!) 174/78   Pulse 60   Temp 97.1 °F (36.2 °C) (Temporal)   Resp 16   Ht 1.702 m (5' 7\")   Wt 90.4 kg (199 lb 3.2 oz)   SpO2 97%   BMI 31.20 kg/m²     Proceed with original surgical plan:  Inspire open implant, hypoglossal nerve neurostimulator, pulse generator, distal respiratory sensor electrode     Electronically signed by NEIL Sheets CNP on 5/5/2025 at 7:31

## 2025-05-05 ENCOUNTER — HOSPITAL ENCOUNTER (OUTPATIENT)
Age: 70
Setting detail: OUTPATIENT SURGERY
Discharge: HOME | End: 2025-05-05
Attending: OTOLARYNGOLOGY | Admitting: OTOLARYNGOLOGY
Payer: COMMERCIAL

## 2025-05-05 ENCOUNTER — APPOINTMENT (OUTPATIENT)
Dept: GENERAL RADIOLOGY | Age: 70
End: 2025-05-05
Attending: OTOLARYNGOLOGY
Payer: COMMERCIAL

## 2025-05-05 ENCOUNTER — ANESTHESIA (OUTPATIENT)
Dept: OPERATING ROOM | Age: 70
End: 2025-05-05
Payer: COMMERCIAL

## 2025-05-05 ENCOUNTER — ANESTHESIA EVENT (OUTPATIENT)
Dept: OPERATING ROOM | Age: 70
End: 2025-05-05
Payer: COMMERCIAL

## 2025-05-05 VITALS
RESPIRATION RATE: 16 BRPM | TEMPERATURE: 97 F | OXYGEN SATURATION: 94 % | HEIGHT: 67 IN | WEIGHT: 199.2 LBS | DIASTOLIC BLOOD PRESSURE: 72 MMHG | BODY MASS INDEX: 31.27 KG/M2 | HEART RATE: 77 BPM | SYSTOLIC BLOOD PRESSURE: 143 MMHG

## 2025-05-05 DIAGNOSIS — G89.18 ACUTE POST-OPERATIVE PAIN: Primary | ICD-10-CM

## 2025-05-05 PROCEDURE — 6360000002 HC RX W HCPCS: Performed by: OTOLARYNGOLOGY

## 2025-05-05 PROCEDURE — 7100000001 HC PACU RECOVERY - ADDTL 15 MIN: Performed by: OTOLARYNGOLOGY

## 2025-05-05 PROCEDURE — APPNB45 APP NON BILLABLE 31-45 MINUTES: Performed by: NURSE PRACTITIONER

## 2025-05-05 PROCEDURE — 3600000003 HC SURGERY LEVEL 3 BASE: Performed by: OTOLARYNGOLOGY

## 2025-05-05 PROCEDURE — 64582 OPN MPLTJ HPGLSL NSTM ARY PG: CPT | Performed by: OTOLARYNGOLOGY

## 2025-05-05 PROCEDURE — 7100000000 HC PACU RECOVERY - FIRST 15 MIN: Performed by: OTOLARYNGOLOGY

## 2025-05-05 PROCEDURE — C1767 GENERATOR, NEURO NON-RECHARG: HCPCS | Performed by: OTOLARYNGOLOGY

## 2025-05-05 PROCEDURE — 2580000003 HC RX 258: Performed by: OTOLARYNGOLOGY

## 2025-05-05 PROCEDURE — 6370000000 HC RX 637 (ALT 250 FOR IP): Performed by: NURSE PRACTITIONER

## 2025-05-05 PROCEDURE — 6360000002 HC RX W HCPCS: Performed by: NURSE PRACTITIONER

## 2025-05-05 PROCEDURE — 7100000010 HC PHASE II RECOVERY - FIRST 15 MIN: Performed by: OTOLARYNGOLOGY

## 2025-05-05 PROCEDURE — 2709999900 HC NON-CHARGEABLE SUPPLY: Performed by: OTOLARYNGOLOGY

## 2025-05-05 PROCEDURE — 6360000002 HC RX W HCPCS: Performed by: NURSE ANESTHETIST, CERTIFIED REGISTERED

## 2025-05-05 PROCEDURE — 2500000003 HC RX 250 WO HCPCS: Performed by: NURSE ANESTHETIST, CERTIFIED REGISTERED

## 2025-05-05 PROCEDURE — 70360 X-RAY EXAM OF NECK: CPT

## 2025-05-05 PROCEDURE — C1787 PATIENT PROGR, NEUROSTIM: HCPCS | Performed by: OTOLARYNGOLOGY

## 2025-05-05 PROCEDURE — C1778 LEAD, NEUROSTIMULATOR: HCPCS | Performed by: OTOLARYNGOLOGY

## 2025-05-05 PROCEDURE — 3600000013 HC SURGERY LEVEL 3 ADDTL 15MIN: Performed by: OTOLARYNGOLOGY

## 2025-05-05 PROCEDURE — 2500000003 HC RX 250 WO HCPCS: Performed by: NURSE PRACTITIONER

## 2025-05-05 PROCEDURE — 3700000001 HC ADD 15 MINUTES (ANESTHESIA): Performed by: OTOLARYNGOLOGY

## 2025-05-05 PROCEDURE — 2720000010 HC SURG SUPPLY STERILE: Performed by: OTOLARYNGOLOGY

## 2025-05-05 PROCEDURE — 6360000002 HC RX W HCPCS

## 2025-05-05 PROCEDURE — 3700000000 HC ANESTHESIA ATTENDED CARE: Performed by: OTOLARYNGOLOGY

## 2025-05-05 PROCEDURE — 7100000011 HC PHASE II RECOVERY - ADDTL 15 MIN: Performed by: OTOLARYNGOLOGY

## 2025-05-05 PROCEDURE — 71046 X-RAY EXAM CHEST 2 VIEWS: CPT

## 2025-05-05 DEVICE — GENERATOR PULSE IMPLANTABLE INSPIRE: Type: IMPLANTABLE DEVICE | Site: CHEST | Status: FUNCTIONAL

## 2025-05-05 DEVICE — LEAD SENSING RESPIRATORY INSPIRE: Type: IMPLANTABLE DEVICE | Site: CHEST | Status: FUNCTIONAL

## 2025-05-05 DEVICE — LEAD STIMULATION UPPER AIRWAY INSPIRE: Type: IMPLANTABLE DEVICE | Site: NECK | Status: FUNCTIONAL

## 2025-05-05 RX ORDER — LIDOCAINE HYDROCHLORIDE 20 MG/ML
INJECTION, SOLUTION INTRAVENOUS
Status: DISCONTINUED | OUTPATIENT
Start: 2025-05-05 | End: 2025-05-05 | Stop reason: SDUPTHER

## 2025-05-05 RX ORDER — DEXAMETHASONE SODIUM PHOSPHATE 10 MG/ML
10 INJECTION, SOLUTION INTRAMUSCULAR; INTRAVENOUS ONCE
Status: DISCONTINUED | OUTPATIENT
Start: 2025-05-05 | End: 2025-05-05

## 2025-05-05 RX ORDER — HYDROCODONE BITARTRATE AND ACETAMINOPHEN 5; 325 MG/1; MG/1
1 TABLET ORAL EVERY 6 HOURS PRN
Qty: 20 TABLET | Refills: 0 | Status: SHIPPED | OUTPATIENT
Start: 2025-05-05 | End: 2025-05-10

## 2025-05-05 RX ORDER — MEPERIDINE HYDROCHLORIDE 25 MG/ML
12.5 INJECTION INTRAMUSCULAR; INTRAVENOUS; SUBCUTANEOUS EVERY 5 MIN PRN
Status: DISCONTINUED | OUTPATIENT
Start: 2025-05-05 | End: 2025-05-05 | Stop reason: HOSPADM

## 2025-05-05 RX ORDER — SODIUM CHLORIDE 0.9 % (FLUSH) 0.9 %
5-40 SYRINGE (ML) INJECTION PRN
Status: DISCONTINUED | OUTPATIENT
Start: 2025-05-05 | End: 2025-05-05 | Stop reason: HOSPADM

## 2025-05-05 RX ORDER — SODIUM CHLORIDE 9 MG/ML
INJECTION, SOLUTION INTRAVENOUS PRN
Status: DISCONTINUED | OUTPATIENT
Start: 2025-05-05 | End: 2025-05-05 | Stop reason: HOSPADM

## 2025-05-05 RX ORDER — MIDAZOLAM HYDROCHLORIDE 1 MG/ML
INJECTION, SOLUTION INTRAMUSCULAR; INTRAVENOUS
Status: DISCONTINUED | OUTPATIENT
Start: 2025-05-05 | End: 2025-05-05 | Stop reason: SDUPTHER

## 2025-05-05 RX ORDER — HYDROCODONE BITARTRATE AND ACETAMINOPHEN 5; 325 MG/1; MG/1
1 TABLET ORAL ONCE
Refills: 0 | Status: COMPLETED | OUTPATIENT
Start: 2025-05-05 | End: 2025-05-05

## 2025-05-05 RX ORDER — SODIUM CHLORIDE 0.9 % (FLUSH) 0.9 %
5-40 SYRINGE (ML) INJECTION EVERY 12 HOURS SCHEDULED
Status: DISCONTINUED | OUTPATIENT
Start: 2025-05-05 | End: 2025-05-05 | Stop reason: HOSPADM

## 2025-05-05 RX ORDER — DEXAMETHASONE SODIUM PHOSPHATE 4 MG/ML
INJECTION, SOLUTION INTRA-ARTICULAR; INTRALESIONAL; INTRAMUSCULAR; INTRAVENOUS; SOFT TISSUE
Status: DISCONTINUED | OUTPATIENT
Start: 2025-05-05 | End: 2025-05-05 | Stop reason: SDUPTHER

## 2025-05-05 RX ORDER — FENTANYL CITRATE 50 UG/ML
50 INJECTION, SOLUTION INTRAMUSCULAR; INTRAVENOUS EVERY 5 MIN PRN
Status: DISCONTINUED | OUTPATIENT
Start: 2025-05-05 | End: 2025-05-05 | Stop reason: HOSPADM

## 2025-05-05 RX ORDER — ONDANSETRON 2 MG/ML
4 INJECTION INTRAMUSCULAR; INTRAVENOUS
Status: DISCONTINUED | OUTPATIENT
Start: 2025-05-05 | End: 2025-05-05 | Stop reason: HOSPADM

## 2025-05-05 RX ORDER — NALOXONE HYDROCHLORIDE 0.4 MG/ML
INJECTION, SOLUTION INTRAMUSCULAR; INTRAVENOUS; SUBCUTANEOUS PRN
Status: DISCONTINUED | OUTPATIENT
Start: 2025-05-05 | End: 2025-05-05 | Stop reason: HOSPADM

## 2025-05-05 RX ORDER — SODIUM CHLORIDE 9 MG/ML
INJECTION, SOLUTION INTRAMUSCULAR; INTRAVENOUS; SUBCUTANEOUS PRN
Status: DISCONTINUED | OUTPATIENT
Start: 2025-05-05 | End: 2025-05-05 | Stop reason: ALTCHOICE

## 2025-05-05 RX ORDER — EPHEDRINE SULFATE 50 MG/ML
INJECTION INTRAVENOUS
Status: DISCONTINUED | OUTPATIENT
Start: 2025-05-05 | End: 2025-05-05 | Stop reason: SDUPTHER

## 2025-05-05 RX ORDER — ONDANSETRON 2 MG/ML
INJECTION INTRAMUSCULAR; INTRAVENOUS
Status: DISCONTINUED | OUTPATIENT
Start: 2025-05-05 | End: 2025-05-05 | Stop reason: SDUPTHER

## 2025-05-05 RX ORDER — ROCURONIUM BROMIDE 10 MG/ML
INJECTION, SOLUTION INTRAVENOUS
Status: DISCONTINUED | OUTPATIENT
Start: 2025-05-05 | End: 2025-05-05 | Stop reason: SDUPTHER

## 2025-05-05 RX ORDER — EPINEPHRINE 1 MG/ML
INJECTION, SOLUTION, CONCENTRATE INTRAVENOUS PRN
Status: DISCONTINUED | OUTPATIENT
Start: 2025-05-05 | End: 2025-05-05 | Stop reason: ALTCHOICE

## 2025-05-05 RX ORDER — FENTANYL CITRATE 50 UG/ML
INJECTION, SOLUTION INTRAMUSCULAR; INTRAVENOUS
Status: DISCONTINUED | OUTPATIENT
Start: 2025-05-05 | End: 2025-05-05 | Stop reason: SDUPTHER

## 2025-05-05 RX ORDER — DEXAMETHASONE SODIUM PHOSPHATE 4 MG/ML
INJECTION, SOLUTION INTRA-ARTICULAR; INTRALESIONAL; INTRAMUSCULAR; INTRAVENOUS; SOFT TISSUE
Status: COMPLETED
Start: 2025-05-05 | End: 2025-05-05

## 2025-05-05 RX ORDER — CEPHALEXIN 500 MG/1
500 CAPSULE ORAL 2 TIMES DAILY
Qty: 20 CAPSULE | Refills: 0 | Status: SHIPPED | OUTPATIENT
Start: 2025-05-05 | End: 2025-05-15

## 2025-05-05 RX ORDER — DEXAMETHASONE SODIUM PHOSPHATE 4 MG/ML
10 INJECTION, SOLUTION INTRA-ARTICULAR; INTRALESIONAL; INTRAMUSCULAR; INTRAVENOUS; SOFT TISSUE ONCE
Status: COMPLETED | OUTPATIENT
Start: 2025-05-05 | End: 2025-05-05

## 2025-05-05 RX ADMIN — FENTANYL CITRATE 50 MCG: 50 INJECTION, SOLUTION INTRAMUSCULAR; INTRAVENOUS at 07:59

## 2025-05-05 RX ADMIN — DEXAMETHASONE SODIUM PHOSPHATE 10 MG: 4 INJECTION, SOLUTION INTRA-ARTICULAR; INTRALESIONAL; INTRAMUSCULAR; INTRAVENOUS; SOFT TISSUE at 06:49

## 2025-05-05 RX ADMIN — WATER 2000 MG: 1 INJECTION INTRAMUSCULAR; INTRAVENOUS; SUBCUTANEOUS at 07:44

## 2025-05-05 RX ADMIN — SUGAMMADEX 200 MG: 100 INJECTION, SOLUTION INTRAVENOUS at 10:17

## 2025-05-05 RX ADMIN — PROPOFOL 200 MG: 10 INJECTION, EMULSION INTRAVENOUS at 07:49

## 2025-05-05 RX ADMIN — EPHEDRINE SULFATE 10 MG: 50 INJECTION INTRAVENOUS at 08:16

## 2025-05-05 RX ADMIN — LIDOCAINE HYDROCHLORIDE 100 MG: 20 INJECTION, SOLUTION INTRAVENOUS at 07:49

## 2025-05-05 RX ADMIN — DEXAMETHASONE SODIUM PHOSPHATE 10 MG: 4 INJECTION, SOLUTION INTRAMUSCULAR; INTRAVENOUS at 07:59

## 2025-05-05 RX ADMIN — EPHEDRINE SULFATE 5 MG: 50 INJECTION INTRAVENOUS at 08:20

## 2025-05-05 RX ADMIN — EPHEDRINE SULFATE 10 MG: 50 INJECTION INTRAVENOUS at 08:23

## 2025-05-05 RX ADMIN — ROCURONIUM BROMIDE 30 MG: 10 INJECTION INTRAVENOUS at 07:50

## 2025-05-05 RX ADMIN — FENTANYL CITRATE 50 MCG: 50 INJECTION, SOLUTION INTRAMUSCULAR; INTRAVENOUS at 07:48

## 2025-05-05 RX ADMIN — ONDANSETRON 4 MG: 2 INJECTION INTRAMUSCULAR; INTRAVENOUS at 07:56

## 2025-05-05 RX ADMIN — MIDAZOLAM 2 MG: 1 INJECTION INTRAMUSCULAR; INTRAVENOUS at 07:44

## 2025-05-05 RX ADMIN — DEXAMETHASONE SODIUM PHOSPHATE 10 MG: 4 INJECTION, SOLUTION INTRAMUSCULAR; INTRAVENOUS at 06:49

## 2025-05-05 RX ADMIN — HYDROCODONE BITARTRATE AND ACETAMINOPHEN 1 TABLET: 5; 325 TABLET ORAL at 11:08

## 2025-05-05 RX ADMIN — SODIUM CHLORIDE: 9 INJECTION, SOLUTION INTRAVENOUS at 07:44

## 2025-05-05 ASSESSMENT — ENCOUNTER SYMPTOMS: SHORTNESS OF BREATH: 1

## 2025-05-05 ASSESSMENT — PAIN SCALES - GENERAL: PAINLEVEL_OUTOF10: 6

## 2025-05-05 ASSESSMENT — PAIN - FUNCTIONAL ASSESSMENT: PAIN_FUNCTIONAL_ASSESSMENT: NONE - DENIES PAIN

## 2025-05-05 ASSESSMENT — PAIN DESCRIPTION - LOCATION: LOCATION: CHEST;NECK

## 2025-05-05 NOTE — OP NOTE
screws being verified to be tight and appropriately secured.  The system was completely connected now so that an external stimulator device could be applied to the skin surface to activate the system.  It was tested at 1.5 volts with a clear tongue protrusion being detected and no torsion or retrusion.  This was taken down to 1.0 V, 0.7 volts and 0.5 volts all of which produced only protrusion and no retrusion or torsion effects.  At 0.3 V there was no protrusion but no retrusion.  A 1.5 volts stimulation was then used to verify that entire respiratory circuit detection and generator activation of the hypoglossal nerve was intact.  This was tested for a period of 2 to 3 minutes showing a consistent detection and activation of the nerve as well as protrusion of the tongue without any decay of the tongue activated.  All of this was done without any evidence of rotation or retrusion effects.        We then irrigated out the pocket that was made in the extrafascial plane over the pectoralis major and oriented the pocket so that the generator was sitting upright with the wires held distally within the pocket with Irrisept.  Irrisept was also used in the submandibular dissection and through the tunnel into the chest.      The 2 sutures that had been previously placed were then run through the generator ports and tied down loosely.  The wound was closed by Crystal in layers with deep inverted 3-0 STRATAFIX on a P2 needle in a running configuration followed by a running subcuticular of of the same suture in the superficial space.  Likewise I closed the cervical wound was closed in the same manner both wounds were further closed with wound glue.   No drains were used.     The patient was then turned back to anesthesia for reversal and extubation.  This was carried out without incident and the patient was taken to recovery in satisfactory condition.     Estimated blood loss in the case was less than 10 cc and the patient

## 2025-05-05 NOTE — ANESTHESIA PRE PROCEDURE
Department of Anesthesiology  Preprocedure Note       Name:  Josef Hernandez   Age:  69 y.o.  :  1955                                          MRN:  [id number]         Date:  2025      Surgeon: Surgeon(s):  Vasu Limon MD    Procedure: Procedure(s):  Inspire open implant, hypoglossal nerve neurostimulator, pulse generator, distal respiratory sensor electrode    Medications prior to admission:   Prior to Admission medications    Medication Sig Start Date End Date Taking? Authorizing Provider   carvedilol (COREG) 6.25 MG tablet Take 1 tablet by mouth 2 times daily 2/10/25  Yes Paul Lopez MD   irbesartan (AVAPRO) 300 MG tablet Take 1 tablet by mouth daily 2/10/25  Yes Paul Lopez MD   rosuvastatin (CRESTOR) 5 MG tablet Take 1 tablet by mouth nightly 25  Yes Owen Livingston DO   silodosin (RAPAFLO) 4 MG CAPS capsule Take 1 capsule by mouth every evening 25  Yes Owen Livingston DO   Coenzyme Q10 (CO Q 10 PO) Take 1 each by mouth daily   Yes Bonnie Sutherland MD   finasteride (PROSCAR) 5 MG tablet TAKE 1 TABLET BY MOUTH EVERY DAY 22  Yes Keyanna Lindquist PA-C   Cyanocobalamin (B-12 PO) Take 3,000 mg by mouth daily   Yes Bonnie Sutherland MD   Hypertonic Nasal Wash (SINUS RINSE) PACK 1 kit by Nasal route daily 1/15/25   Vasu Limon MD   aspirin 81 MG chewable tablet Take 1 tablet by mouth daily    ProviderBonnie MD       Current medications:    Current Facility-Administered Medications   Medication Dose Route Frequency Provider Last Rate Last Admin   • ceFAZolin (ANCEF) 2,000 mg in sterile water 20 mL IV syringe  2,000 mg IntraVENous Once Renée Graham APRN - CNP       • sodium chloride flush 0.9 % injection 5-40 mL  5-40 mL IntraVENous 2 times per day Vasu Limon MD       • sodium chloride flush 0.9 % injection 5-40 mL  5-40 mL IntraVENous PRN Vasu Limon MD       • 0.9 % sodium chloride infusion   IntraVENous PRN

## 2025-05-05 NOTE — PROGRESS NOTES
1022: Pt arrived to Phase I recovery via cart. Eyes closed with spontaneous respirations even and unlabored. Oral airway intact and O2 at 10L via simple mask. Report received from NICOLÁS Ge and SUHAS Gaona.   1025: VSS. Incisions to right chest and right neck clean, dry and intact with surgical glue.   1030: VSS.   1035: VSS.  1040: VSS. Ice pack applied to surgical incision to R chest. Simple mask removed.  1045: VSS. Pt awakens to voice. Oral airway removed. Patient denies pain. Will continue to monitor.  1050: VSS. HOB elevated. Pt tolerating sips of ginger ale.   1052: Patient meets criteria to complete Phase I recovery.  1053: Entered into Phase II recovery via cart. Wife present in room. HOB elevated. Snack and drink provided. Call light placed in reach. Pain to R chest rated 5/10.   1108: Patient rates pain to R chest 6/10. Norco given for pain. More snack and drink provided. Call light in reach and wife remains at bedside.  1130: Pt escorted to xray via transport.  1154: Pt returned from xray. States pain improved. Meets criteria for discharge.  1205: Discharge instructions reviewed with patient and patient's wife. AVS provided for discharge. IV removed. Patient dressed.  1225: Pt escorted to vehicle and discharged home in stable condition with wife.

## 2025-05-05 NOTE — ANESTHESIA POSTPROCEDURE EVALUATION
Department of Anesthesiology  Postprocedure Note    Patient: Josef Hernandez  MRN: [id number]  YOB: 1955  Date of evaluation: 5/5/2025    Procedure Summary       Date: 05/05/25 Room / Location: [address]    Anesthesia Start: 0744 Anesthesia Stop: 1025    Procedure: Inspire open implant, hypoglossal nerve neurostimulator, pulse generator, distal respiratory sensor electrode (Chest) Diagnosis:       Obstructive sleep apnea      Intolerance of continuous positive airway pressure (CPAP) ventilation      (Obstructive sleep apnea [G47.33])      (Intolerance of continuous positive airway pressure (CPAP) ventilation [Z78.9])    Surgeons: Vasu Limon MD Responsible Provider: Bennie Jorgensen DO    Anesthesia Type: general ASA Status: 3            Anesthesia Type: No value filed.    Alida Phase I: Alida Score: 8    Alida Phase II:      Anesthesia Post Evaluation    Patient location during evaluation: PACU  Patient participation: complete - patient participated  Level of consciousness: awake and alert  Pain score: 3  Airway patency: patent  Nausea & Vomiting: no nausea and no vomiting  Cardiovascular status: hemodynamically stable and blood pressure returned to baseline  Respiratory status: spontaneous ventilation, room air and acceptable  Hydration status: stable  Pain management: adequate and satisfactory to patient    No notable events documented.

## 2025-05-07 ENCOUNTER — OFFICE VISIT (OUTPATIENT)
Dept: CARDIOLOGY CLINIC | Age: 70
End: 2025-05-07
Payer: COMMERCIAL

## 2025-05-07 VITALS
DIASTOLIC BLOOD PRESSURE: 80 MMHG | SYSTOLIC BLOOD PRESSURE: 150 MMHG | WEIGHT: 206 LBS | BODY MASS INDEX: 32.33 KG/M2 | HEIGHT: 67 IN | HEART RATE: 64 BPM

## 2025-05-07 DIAGNOSIS — I10 PRIMARY HYPERTENSION: ICD-10-CM

## 2025-05-07 DIAGNOSIS — I25.10 CORONARY ARTERY DISEASE INVOLVING NATIVE CORONARY ARTERY OF NATIVE HEART WITHOUT ANGINA PECTORIS: Primary | ICD-10-CM

## 2025-05-07 DIAGNOSIS — E78.01 FAMILIAL HYPERCHOLESTEROLEMIA: ICD-10-CM

## 2025-05-07 PROCEDURE — 99213 OFFICE O/P EST LOW 20 MIN: CPT | Performed by: NUCLEAR MEDICINE

## 2025-05-07 PROCEDURE — 3077F SYST BP >= 140 MM HG: CPT | Performed by: NUCLEAR MEDICINE

## 2025-05-07 PROCEDURE — 1123F ACP DISCUSS/DSCN MKR DOCD: CPT | Performed by: NUCLEAR MEDICINE

## 2025-05-07 PROCEDURE — 3079F DIAST BP 80-89 MM HG: CPT | Performed by: NUCLEAR MEDICINE

## 2025-05-07 NOTE — PROGRESS NOTES
Pt here for fu   Denies chest pain, SOB, dizziness   No med list today, pt states he checked them on my chart

## 2025-05-07 NOTE — PROGRESS NOTES
[facility] [place] [facility]  [facility]  [address]  [address]  [facility] [address]  Dept: [phone]  Dept Fax: [phone]  Loc: [phone]    Visit Date: 5/7/2025    Josef Hernandez is a 69 y.o. male who presents todayfor:  Chief Complaint   Patient presents with    Follow-up    Hypertension    Hyperlipidemia    Coronary Artery Disease   Know LAD stent  Recent stress test was okay   No chest pain   No changes in breathing  Bp is stable  No dizziness  No syncope  On statins for hyperlipidemia  No issues with the meds       HPI:  HPI  Past Medical History:   Diagnosis Date    Arthritis     CAD (coronary artery disease) 2008    x3 stents, MI    Hyperlipidemia     Hypertension     MI, old     Sleep apnea     not able to wear CPAP      Past Surgical History:   Procedure Laterality Date    COLONOSCOPY      CORONARY ANGIOPLASTY WITH STENT PLACEMENT  2008    DIAGNOSTIC CARDIAC CATH LAB PROCEDURE  02/2020    EXAMINATION UNDER ANESTHESIA N/A 1/7/2025    DRUG INDUCED SLEEP ENDOSCOPY performed by Vasu Limon MD at [facility] OR    JOINT REPLACEMENT Left 03/05/2020    partial knee replacement    NASAL SURG PROC UNLISTED N/A 1/7/2025    SEPTOPLASTY, INFERIOR TURBINATE REDUCTION NASAL VALVE COLLAPSE REPAIR WITH IMPLANT performed by Vasu Limon MD at [facility] OR    STIMULATOR SURGERY N/A 5/5/2025    Inspire open implant, hypoglossal nerve neurostimulator, pulse generator, distal respiratory sensor electrode performed by Vasu Limon MD at [facility] OR     Family History   Problem Relation Age of Onset    Stroke Mother     Cancer Mother         skin    High Blood Pressure Mother     Heart Disease Mother     Heart Disease Father     Heart Attack Father     No Known Problems Sister     No Known Problems Brother     No Known Problems Brother     Diabetes Neg Hx      Social History     Tobacco Use    Smoking status: Former     Current packs/day: 0.00     Average

## 2025-05-14 ENCOUNTER — OFFICE VISIT (OUTPATIENT)
Dept: ENT CLINIC | Age: 70
End: 2025-05-14

## 2025-05-14 VITALS
HEART RATE: 51 BPM | HEIGHT: 67 IN | OXYGEN SATURATION: 100 % | BODY MASS INDEX: 31.39 KG/M2 | DIASTOLIC BLOOD PRESSURE: 80 MMHG | TEMPERATURE: 97.1 F | SYSTOLIC BLOOD PRESSURE: 138 MMHG | RESPIRATION RATE: 18 BRPM | WEIGHT: 200 LBS

## 2025-05-14 DIAGNOSIS — G47.33 OBSTRUCTIVE SLEEP APNEA: ICD-10-CM

## 2025-05-14 DIAGNOSIS — Z96.82 S/P PLACEMENT OF HYPOGLOSSAL NERVE STIMULATOR: ICD-10-CM

## 2025-05-14 DIAGNOSIS — Z78.9 INTOLERANCE OF CONTINUOUS POSITIVE AIRWAY PRESSURE (CPAP) VENTILATION: Primary | ICD-10-CM

## 2025-05-14 PROCEDURE — 99024 POSTOP FOLLOW-UP VISIT: CPT | Performed by: NURSE PRACTITIONER

## 2025-06-17 ENCOUNTER — OFFICE VISIT (OUTPATIENT)
Age: 70
End: 2025-06-17
Payer: COMMERCIAL

## 2025-06-17 VITALS
WEIGHT: 198.1 LBS | TEMPERATURE: 97.9 F | DIASTOLIC BLOOD PRESSURE: 60 MMHG | OXYGEN SATURATION: 94 % | HEART RATE: 68 BPM | SYSTOLIC BLOOD PRESSURE: 122 MMHG | HEIGHT: 67 IN | BODY MASS INDEX: 31.09 KG/M2

## 2025-06-17 DIAGNOSIS — Z96.82 S/P PLACEMENT OF HYPOGLOSSAL NERVE STIMULATOR: ICD-10-CM

## 2025-06-17 DIAGNOSIS — E66.9 OBESITY (BMI 30-39.9): ICD-10-CM

## 2025-06-17 DIAGNOSIS — Z45.42 ENCOUNTER FOR ADJUSTMENT AND MANAGEMENT OF NEUROSTIMULATOR: ICD-10-CM

## 2025-06-17 DIAGNOSIS — G47.33 OSA (OBSTRUCTIVE SLEEP APNEA): Primary | ICD-10-CM

## 2025-06-17 PROCEDURE — 95977 ALYS CPLX CN NPGT PRGRMG: CPT

## 2025-06-17 PROCEDURE — 1123F ACP DISCUSS/DSCN MKR DOCD: CPT

## 2025-06-17 PROCEDURE — 99214 OFFICE O/P EST MOD 30 MIN: CPT

## 2025-06-17 ASSESSMENT — ENCOUNTER SYMPTOMS
WHEEZING: 0
SHORTNESS OF BREATH: 0
RHINORRHEA: 0
SORE THROAT: 0
COUGH: 0

## 2025-06-17 NOTE — PROGRESS NOTES
[facility]      Josef Hernandez         [id number]  6/17/2025   Chief Complaint   Patient presents with    Follow-up     Inspire f/u activation, implanted 5/5/25          Original or initial AHI: 42.2     Date of initial study: 3/22/24    ENT surgeon: Braxton  Pre-Implant ESS 4 on 7/17/24    Date of Inspire Implant: 5/5/25  Date of activation: 6/17/25     Neck Size: 15  Mallampati 3    SAQLI: 93  ESS:  3    Presentation:   Josef presents for sleep medicine follow up for Inspire follow up   Patient reports incisions are healing well.  No complications following surgery.  Reports normal tongue movement.  Patient's spouse denies any slurring of words from patient.      Progress History:   Since last visit any new medical issues? No  New ER or hospital visits? No  Any new or changes in medicines? No  Any new sleep medicines? No    Review of Systems -   Review of Systems   Constitutional:  Negative for appetite change and fever.   HENT:  Negative for congestion, postnasal drip, rhinorrhea, sneezing and sore throat.    Respiratory:  Negative for cough, shortness of breath and wheezing.    Cardiovascular: Negative.  Negative for chest pain, palpitations and leg swelling.   Allergic/Immunologic: Negative for environmental allergies.        Physical Exam:    BMI:  Body mass index is 31.03 kg/m².    Wt Readings from Last 3 Encounters:   06/17/25 89.9 kg (198 lb 1.6 oz)   05/14/25 90.7 kg (200 lb)   05/07/25 93.4 kg (206 lb)     Weight stable / unchanged  Vitals: /60   Pulse 68   Temp 97.9 °F (36.6 °C)   Ht 1.702 m (5' 7\")   Wt 89.9 kg (198 lb 1.6 oz)   SpO2 94%   BMI 31.03 kg/m²           Physical Exam  Vitals and nursing note reviewed.   Constitutional:       General: He is not in acute distress.     Appearance: He is obese.   HENT:      Head: Normocephalic and atraumatic.      Right Ear: External ear normal.      Left Ear: External ear normal.      Mouth/Throat:

## 2025-07-29 ENCOUNTER — OFFICE VISIT (OUTPATIENT)
Age: 70
End: 2025-07-29
Payer: COMMERCIAL

## 2025-07-29 VITALS
DIASTOLIC BLOOD PRESSURE: 78 MMHG | HEART RATE: 66 BPM | HEIGHT: 67 IN | WEIGHT: 198.3 LBS | SYSTOLIC BLOOD PRESSURE: 138 MMHG | BODY MASS INDEX: 31.12 KG/M2 | OXYGEN SATURATION: 96 % | TEMPERATURE: 98 F | RESPIRATION RATE: 16 BRPM

## 2025-07-29 DIAGNOSIS — Z45.42 ENCOUNTER FOR ADJUSTMENT AND MANAGEMENT OF NEUROSTIMULATOR: ICD-10-CM

## 2025-07-29 DIAGNOSIS — E66.9 OBESITY (BMI 30-39.9): ICD-10-CM

## 2025-07-29 DIAGNOSIS — Z96.82 S/P PLACEMENT OF HYPOGLOSSAL NERVE STIMULATOR: ICD-10-CM

## 2025-07-29 DIAGNOSIS — G47.33 OSA (OBSTRUCTIVE SLEEP APNEA): Primary | ICD-10-CM

## 2025-07-29 PROCEDURE — 99214 OFFICE O/P EST MOD 30 MIN: CPT

## 2025-07-29 PROCEDURE — 95976 ALYS SMPL CN NPGT PRGRMG: CPT

## 2025-07-29 PROCEDURE — 1123F ACP DISCUSS/DSCN MKR DOCD: CPT

## 2025-07-29 ASSESSMENT — ENCOUNTER SYMPTOMS
COUGH: 0
WHEEZING: 0
SHORTNESS OF BREATH: 0
RHINORRHEA: 0
SORE THROAT: 0

## 2025-07-29 NOTE — PROGRESS NOTES
[facility] Sleep [facility]      Josef Hernandez         [id number]  7/29/2025   Chief Complaint   Patient presents with    Follow-up     1 Month Inspire Follow up          Original or initial AHI: 42.2     Date of initial study: 3/22/24    ENT surgeon: Braxton  Pre-Implant ESS 4 on 7/17/24     Date of Inspire Implant: 5/5/25  Date of activation: 6/17/25   Date of Fine Tune Sleep Study: pending    Current Settings   amplitude: 1.3  Limits: 0.7-1.8  Start delay: 20  Pause Delay: 15  Duration: 8  Rate: 33  PW: 90      SAQLI: 95  ESS:  2    Inspire Rep present : yes - Jim  Presentation:   Josef presents for sleep medicine follow up for Inspire follow up   Sometimes when he wakes up overnight therapy keeps him awake.   Reports that therapy is not waking him up  He is not using pause feature.       Sleep issues:  Subjective benefit : No - always felt fine during the day      Progress History:   Since last visit any new medical issues? No  New ER or hospital visits? No  Any new or changes in medicines? No  Any new sleep medicines? No    Review of Systems -   Review of Systems   Constitutional:  Negative for appetite change and fever.   HENT:  Negative for congestion, postnasal drip, rhinorrhea, sneezing and sore throat.    Respiratory:  Negative for cough, shortness of breath and wheezing.    Cardiovascular: Negative.    Allergic/Immunologic: Negative for environmental allergies.        Physical Exam:    BMI:  Body mass index is 31.06 kg/m².    Wt Readings from Last 3 Encounters:   07/29/25 89.9 kg (198 lb 4.8 oz)   06/17/25 89.9 kg (198 lb 1.6 oz)   05/14/25 90.7 kg (200 lb)     Weight stable / unchanged  Vitals: /78 (BP Site: Left Upper Arm, Patient Position: Sitting)   Pulse 66   Temp 98 °F (36.7 °C) (Temporal)   Resp 16   Ht 1.702 m (5' 7\")   Wt 89.9 kg (198 lb 4.8 oz)   SpO2 96%   BMI 31.06 kg/m²         Physical Exam  Vitals and nursing note reviewed.   Constitutional:

## 2025-08-22 ENCOUNTER — HOSPITAL ENCOUNTER (EMERGENCY)
Age: 70
Discharge: HOME | End: 2025-08-22
Payer: COMMERCIAL

## 2025-08-22 VITALS
BODY MASS INDEX: 31.08 KG/M2 | TEMPERATURE: 97 F | HEIGHT: 67 IN | OXYGEN SATURATION: 99 % | WEIGHT: 198 LBS | RESPIRATION RATE: 16 BRPM | HEART RATE: 64 BPM | SYSTOLIC BLOOD PRESSURE: 163 MMHG | DIASTOLIC BLOOD PRESSURE: 77 MMHG

## 2025-08-22 DIAGNOSIS — M25.561 POSTERIOR RIGHT KNEE PAIN: Primary | ICD-10-CM

## 2025-08-22 PROCEDURE — 99213 OFFICE O/P EST LOW 20 MIN: CPT

## 2025-08-22 PROCEDURE — 99213 OFFICE O/P EST LOW 20 MIN: CPT | Performed by: NURSE PRACTITIONER

## 2025-08-22 RX ORDER — MENTHOL 787.5 MG/1
PATCH TOPICAL
COMMUNITY
Start: 2025-08-22

## 2025-08-22 RX ORDER — IBUPROFEN 400 MG/1
400 TABLET, FILM COATED ORAL 2 TIMES DAILY WITH MEALS
COMMUNITY
Start: 2025-08-22

## 2025-08-22 RX ORDER — IBUPROFEN 400 MG/1
400 TABLET, FILM COATED ORAL EVERY 6 HOURS PRN
COMMUNITY

## 2025-08-22 ASSESSMENT — ENCOUNTER SYMPTOMS
BACK PAIN: 0
COUGH: 0
COLOR CHANGE: 0
STRIDOR: 0
WHEEZING: 0
CHOKING: 0
APNEA: 0
SHORTNESS OF BREATH: 0
CHEST TIGHTNESS: 0

## 2025-08-22 ASSESSMENT — PAIN DESCRIPTION - PAIN TYPE: TYPE: ACUTE PAIN

## 2025-08-22 ASSESSMENT — PAIN DESCRIPTION - ONSET: ONSET: SUDDEN

## 2025-08-22 ASSESSMENT — PAIN - FUNCTIONAL ASSESSMENT
PAIN_FUNCTIONAL_ASSESSMENT: INTOLERABLE, UNABLE TO DO ANY ACTIVE OR PASSIVE ACTIVITIES
PAIN_FUNCTIONAL_ASSESSMENT: 0-10

## 2025-08-22 ASSESSMENT — PAIN DESCRIPTION - DESCRIPTORS: DESCRIPTORS: DISCOMFORT

## 2025-08-22 ASSESSMENT — PAIN SCALES - GENERAL: PAINLEVEL_OUTOF10: 10

## 2025-08-22 ASSESSMENT — PAIN DESCRIPTION - LOCATION: LOCATION: LEG

## 2025-08-22 ASSESSMENT — PAIN DESCRIPTION - ORIENTATION: ORIENTATION: RIGHT

## 2025-08-22 ASSESSMENT — PAIN DESCRIPTION - FREQUENCY: FREQUENCY: INTERMITTENT

## 2025-09-02 ENCOUNTER — OFFICE VISIT (OUTPATIENT)
Age: 70
End: 2025-09-02
Payer: COMMERCIAL

## 2025-09-02 VITALS
DIASTOLIC BLOOD PRESSURE: 80 MMHG | WEIGHT: 200.2 LBS | BODY MASS INDEX: 31.42 KG/M2 | HEART RATE: 61 BPM | OXYGEN SATURATION: 100 % | TEMPERATURE: 98 F | SYSTOLIC BLOOD PRESSURE: 138 MMHG | HEIGHT: 67 IN

## 2025-09-02 DIAGNOSIS — G47.33 OSA (OBSTRUCTIVE SLEEP APNEA): Primary | ICD-10-CM

## 2025-09-02 DIAGNOSIS — Z45.42 ENCOUNTER FOR ADJUSTMENT AND MANAGEMENT OF NEUROSTIMULATOR: ICD-10-CM

## 2025-09-02 DIAGNOSIS — Z96.82 S/P PLACEMENT OF HYPOGLOSSAL NERVE STIMULATOR: ICD-10-CM

## 2025-09-02 DIAGNOSIS — E66.9 OBESITY (BMI 30-39.9): ICD-10-CM

## 2025-09-02 PROCEDURE — 95970 ALYS NPGT W/O PRGRMG: CPT

## 2025-09-02 PROCEDURE — 99214 OFFICE O/P EST MOD 30 MIN: CPT

## 2025-09-02 PROCEDURE — 1123F ACP DISCUSS/DSCN MKR DOCD: CPT

## 2025-09-02 ASSESSMENT — ENCOUNTER SYMPTOMS
COUGH: 0
SORE THROAT: 0
WHEEZING: 0
RHINORRHEA: 0
SHORTNESS OF BREATH: 0

## (undated) DEVICE — FLEXIBLE ENDOSCOPE AND SPECIMEN SAMPLING SYSTEM: Brand: ASCOPE™ 5 BRONCHO 4.2/2.2 SAMPLER SET

## (undated) DEVICE — SPONGE,NEURO,0.5"X0.5",XR,STRL,10/PK: Brand: MEDLINE

## (undated) DEVICE — GLOVE SURG SZ 7.5 L11.73IN FNGR THK9.8MIL STRW LTX POLYMER

## (undated) DEVICE — BLADE, TONGUE, 6", STERILE: Brand: MEDLINE

## (undated) DEVICE — CATHETER IV 18GA L1.16IN OD1.27-1.3462MM ID0.9398-1.016MM

## (undated) DEVICE — KIT,ANTI FOG,W/SPONGE & FLUID,SOFT PACK: Brand: MEDLINE

## (undated) DEVICE — LIQUIBAND RAPID ADHESIVE 36/CS 0.8ML: Brand: MEDLINE

## (undated) DEVICE — APPLICATOR MEDICATED 10.5 CC SOLUTION CLR STRL CHLORAPREP

## (undated) DEVICE — 3M™ STERI-DRAPE™ FLUOROSCOPE DRAPE, 10 PER CARTON / 4 CARTONS PER CASE, 1012: Brand: STERI-DRAPE™

## (undated) DEVICE — SYRINGE IRRIG 60ML SFT PLIABLE BLB EZ TO GRP 1 HND USE W/

## (undated) DEVICE — SUTURE PERMAHAND SZ 3-0 L18IN NONABSORBABLE BLK L26MM SH C013D

## (undated) DEVICE — PACK-MAJOR

## (undated) DEVICE — SHEET, ORTHO, SPLIT, STERILE: Brand: MEDLINE

## (undated) DEVICE — 3M™ STERI-DRAPE™ INSTRUMENT POUCH 1018: Brand: STERI-DRAPE™

## (undated) DEVICE — Device

## (undated) DEVICE — SPLINT 1524050 5PK PAIR DOYLE II AIRWAY: Brand: DOYLE II ™

## (undated) DEVICE — SUTURE PROL SZ 2-0 L18IN NONABSORBABLE BLU FS L26MM 3/8 CIR 8685H

## (undated) DEVICE — COVER US PRB W5XL96IN LTX W/ GEL

## (undated) DEVICE — DISSECTOR ENDOSCP L21CM TIP CURVATURE 40DEG FN CRV JAW VES

## (undated) DEVICE — SUTURE PERMAHAND SZ 2-0 L18IN NONABSORBABLE BLK L26MM SH C012D

## (undated) DEVICE — SYRINGE MED 10ML TRNSLUC BRL PLUNG BLK MRK POLYPR CTRL

## (undated) DEVICE — MARKER,SKIN,WI/RULER AND LABELS: Brand: MEDLINE

## (undated) DEVICE — CORD,CAUTERY,BIPOLAR,STERILE: Brand: MEDLINE

## (undated) DEVICE — 3M™ IOBAN™ 2 ANTIMICROBIAL INCISE DRAPE 6650EZ: Brand: IOBAN™ 2

## (undated) DEVICE — DRESSING TRNSPAR W2XL2.75IN FLM SHT SEMIPERMEABLE WIND

## (undated) DEVICE — TUBING, SUCTION, 1/4" X 20', STRAIGHT: Brand: MEDLINE INDUSTRIES, INC.

## (undated) DEVICE — ENT: Brand: MEDLINE INDUSTRIES, INC.

## (undated) DEVICE — SPONGE,PEANUT,XRAY,ST,SM,3/8",5/CARD: Brand: MEDLINE INDUSTRIES, INC.

## (undated) DEVICE — 450 ML BOTTLE OF 0.05% CHLORHEXIDINE GLUCONATE IN 99.95% STERILE WATER FOR IRRIGATION, USP AND APPLICATOR.: Brand: IRRISEPT ANTIMICROBIAL WOUND LAVAGE

## (undated) DEVICE — LOOP VES W13MM THK09MM MINI RED SIL FLD REPELLENT

## (undated) DEVICE — REMOTE SLEEP PATIENT CONTROL

## (undated) DEVICE — LOOP VES W25MM THK1MM MAXI RED SIL FLD REPELLENT 100 PER

## (undated) DEVICE — ENTACT SEPTAL STAPLER 3 PACK: Brand: ENT SINUS

## (undated) DEVICE — PADDING,UNDERCAST,COTTON, 4"X4YD STERILE: Brand: MEDLINE

## (undated) DEVICE — DRAPE,INSTRUMENT,MAGNETIC,10X16: Brand: MEDLINE

## (undated) DEVICE — ELECTRODE 8227304 5PK PRASS PR 18MM ROHS

## (undated) DEVICE — SUTURE MONOCRYL SZ 4 0 L18IN ABSRB UD P 3 3 8 CIR REV CUT NDL MCP494G

## (undated) DEVICE — PROBE 8225401 5PK SD-SD BIPOL STIM ROHS

## (undated) DEVICE — NEEDLE SPNL L3.5IN DIA25GA QNCKE TYP BVL SPINOCAN

## (undated) DEVICE — BLADE,CARBON-STEEL,15,STRL,DISPOSABLE,TB: Brand: MEDLINE

## (undated) DEVICE — GOWN,SIRUS,NONRNF,SETINSLV,XL,20/CS: Brand: MEDLINE